# Patient Record
Sex: FEMALE | Race: BLACK OR AFRICAN AMERICAN | NOT HISPANIC OR LATINO | URBAN - METROPOLITAN AREA
[De-identification: names, ages, dates, MRNs, and addresses within clinical notes are randomized per-mention and may not be internally consistent; named-entity substitution may affect disease eponyms.]

---

## 2019-01-31 ENCOUNTER — INPATIENT (INPATIENT)
Facility: HOSPITAL | Age: 69
LOS: 15 days | Discharge: NURSING, REHAB OR LONG-TERM CARE | End: 2019-02-16
Attending: INTERNAL MEDICINE | Admitting: INTERNAL MEDICINE
Payer: MEDICARE

## 2019-01-31 VITALS — DIASTOLIC BLOOD PRESSURE: 94 MMHG | SYSTOLIC BLOOD PRESSURE: 167 MMHG

## 2019-01-31 DIAGNOSIS — N63.10 UNSPECIFIED LUMP IN THE RIGHT BREAST, UNSPECIFIED QUADRANT: ICD-10-CM

## 2019-01-31 LAB
ALBUMIN SERPL ELPH-MCNC: 2.3 G/DL — LOW (ref 3.3–5)
ALP SERPL-CCNC: 220 U/L — HIGH (ref 40–120)
ALT FLD-CCNC: < 4 U/L — LOW (ref 4–33)
ANION GAP SERPL CALC-SCNC: 14 MMO/L — SIGNIFICANT CHANGE UP (ref 7–14)
APTT BLD: 25 SEC — LOW (ref 27.5–36.3)
AST SERPL-CCNC: 40 U/L — HIGH (ref 4–32)
BASE EXCESS BLDV CALC-SCNC: 1.6 MMOL/L — SIGNIFICANT CHANGE UP
BASOPHILS # BLD AUTO: 0.05 K/UL — SIGNIFICANT CHANGE UP (ref 0–0.2)
BASOPHILS NFR BLD AUTO: 0.3 % — SIGNIFICANT CHANGE UP (ref 0–2)
BILIRUB SERPL-MCNC: 0.4 MG/DL — SIGNIFICANT CHANGE UP (ref 0.2–1.2)
BLD GP AB SCN SERPL QL: NEGATIVE — SIGNIFICANT CHANGE UP
BLOOD GAS VENOUS - CREATININE: 0.41 MG/DL — LOW (ref 0.5–1.3)
BUN SERPL-MCNC: 11 MG/DL — SIGNIFICANT CHANGE UP (ref 7–23)
CALCIUM SERPL-MCNC: 9 MG/DL — SIGNIFICANT CHANGE UP (ref 8.4–10.5)
CHLORIDE BLDV-SCNC: 107 MMOL/L — SIGNIFICANT CHANGE UP (ref 96–108)
CHLORIDE SERPL-SCNC: 103 MMOL/L — SIGNIFICANT CHANGE UP (ref 98–107)
CO2 SERPL-SCNC: 24 MMOL/L — SIGNIFICANT CHANGE UP (ref 22–31)
CREAT SERPL-MCNC: 0.48 MG/DL — LOW (ref 0.5–1.3)
EOSINOPHIL # BLD AUTO: 0.02 K/UL — SIGNIFICANT CHANGE UP (ref 0–0.5)
EOSINOPHIL NFR BLD AUTO: 0.1 % — SIGNIFICANT CHANGE UP (ref 0–6)
GAS PNL BLDV: 139 MMOL/L — SIGNIFICANT CHANGE UP (ref 136–146)
GLUCOSE BLDV-MCNC: 72 — SIGNIFICANT CHANGE UP (ref 70–99)
GLUCOSE SERPL-MCNC: 75 MG/DL — SIGNIFICANT CHANGE UP (ref 70–99)
HCO3 BLDV-SCNC: 25 MMOL/L — SIGNIFICANT CHANGE UP (ref 20–27)
HCT VFR BLD CALC: 24.2 % — LOW (ref 34.5–45)
HCT VFR BLDV CALC: 21.6 % — LOW (ref 34.5–45)
HGB BLD-MCNC: 6.8 G/DL — CRITICAL LOW (ref 11.5–15.5)
HGB BLDV-MCNC: 6.9 G/DL — CRITICAL LOW (ref 11.5–15.5)
IMM GRANULOCYTES NFR BLD AUTO: 2.4 % — HIGH (ref 0–1.5)
INR BLD: 1.26 — HIGH (ref 0.88–1.17)
LACTATE BLDV-MCNC: 4 MMOL/L — CRITICAL HIGH (ref 0.5–2)
LYMPHOCYTES # BLD AUTO: 1.6 K/UL — SIGNIFICANT CHANGE UP (ref 1–3.3)
LYMPHOCYTES # BLD AUTO: 10 % — LOW (ref 13–44)
MAGNESIUM SERPL-MCNC: 2.1 MG/DL — SIGNIFICANT CHANGE UP (ref 1.6–2.6)
MCHC RBC-ENTMCNC: 27.5 PG — SIGNIFICANT CHANGE UP (ref 27–34)
MCHC RBC-ENTMCNC: 28.1 % — LOW (ref 32–36)
MCV RBC AUTO: 98 FL — SIGNIFICANT CHANGE UP (ref 80–100)
MONOCYTES # BLD AUTO: 0.6 K/UL — SIGNIFICANT CHANGE UP (ref 0–0.9)
MONOCYTES NFR BLD AUTO: 3.7 % — SIGNIFICANT CHANGE UP (ref 2–14)
NEUTROPHILS # BLD AUTO: 13.38 K/UL — HIGH (ref 1.8–7.4)
NEUTROPHILS NFR BLD AUTO: 83.5 % — HIGH (ref 43–77)
NRBC # FLD: 0.03 K/UL — LOW (ref 25–125)
OB PNL STL: NEGATIVE — SIGNIFICANT CHANGE UP
PCO2 BLDV: 43 MMHG — SIGNIFICANT CHANGE UP (ref 41–51)
PH BLDV: 7.4 PH — SIGNIFICANT CHANGE UP (ref 7.32–7.43)
PHOSPHATE SERPL-MCNC: 2.9 MG/DL — SIGNIFICANT CHANGE UP (ref 2.5–4.5)
PLATELET # BLD AUTO: 662 K/UL — HIGH (ref 150–400)
PMV BLD: 9.2 FL — SIGNIFICANT CHANGE UP (ref 7–13)
PO2 BLDV: 35 MMHG — SIGNIFICANT CHANGE UP (ref 35–40)
POTASSIUM BLDV-SCNC: 6.2 MMOL/L — CRITICAL HIGH (ref 3.4–4.5)
POTASSIUM SERPL-MCNC: 4.3 MMOL/L — SIGNIFICANT CHANGE UP (ref 3.5–5.3)
POTASSIUM SERPL-SCNC: 4.3 MMOL/L — SIGNIFICANT CHANGE UP (ref 3.5–5.3)
PROT SERPL-MCNC: 6.7 G/DL — SIGNIFICANT CHANGE UP (ref 6–8.3)
PROTHROM AB SERPL-ACNC: 14.5 SEC — HIGH (ref 9.8–13.1)
RBC # BLD: 2.47 M/UL — LOW (ref 3.8–5.2)
RBC # FLD: 18.6 % — HIGH (ref 10.3–14.5)
RH IG SCN BLD-IMP: POSITIVE — SIGNIFICANT CHANGE UP
RH IG SCN BLD-IMP: POSITIVE — SIGNIFICANT CHANGE UP
SAO2 % BLDV: 55.4 % — LOW (ref 60–85)
SODIUM SERPL-SCNC: 141 MMOL/L — SIGNIFICANT CHANGE UP (ref 135–145)
WBC # BLD: 16.04 K/UL — HIGH (ref 3.8–10.5)
WBC # FLD AUTO: 16.04 K/UL — HIGH (ref 3.8–10.5)

## 2019-01-31 PROCEDURE — 99223 1ST HOSP IP/OBS HIGH 75: CPT

## 2019-01-31 PROCEDURE — 15100 SPLT AGRFT T/A/L 1ST 100SQCM: CPT

## 2019-01-31 PROCEDURE — 71045 X-RAY EXAM CHEST 1 VIEW: CPT | Mod: 26

## 2019-01-31 PROCEDURE — 15101 SPLT AGRFT T/A/L EA ADDL 100: CPT

## 2019-01-31 RX ORDER — VANCOMYCIN HCL 1 G
1000 VIAL (EA) INTRAVENOUS EVERY 12 HOURS
Qty: 0 | Refills: 0 | Status: DISCONTINUED | OUTPATIENT
Start: 2019-01-31 | End: 2019-02-01

## 2019-01-31 RX ORDER — INFLUENZA VIRUS VACCINE 15; 15; 15; 15 UG/.5ML; UG/.5ML; UG/.5ML; UG/.5ML
0.5 SUSPENSION INTRAMUSCULAR ONCE
Qty: 0 | Refills: 0 | Status: DISCONTINUED | OUTPATIENT
Start: 2019-01-31 | End: 2019-02-11

## 2019-01-31 RX ORDER — SODIUM CHLORIDE 9 MG/ML
1000 INJECTION INTRAMUSCULAR; INTRAVENOUS; SUBCUTANEOUS ONCE
Qty: 0 | Refills: 0 | Status: COMPLETED | OUTPATIENT
Start: 2019-01-31 | End: 2019-01-31

## 2019-01-31 RX ORDER — LACTOBACILLUS ACIDOPHILUS 100MM CELL
1 CAPSULE ORAL EVERY 12 HOURS
Qty: 0 | Refills: 0 | Status: DISCONTINUED | OUTPATIENT
Start: 2019-01-31 | End: 2019-02-16

## 2019-01-31 RX ORDER — MORPHINE SULFATE 50 MG/1
2 CAPSULE, EXTENDED RELEASE ORAL ONCE
Qty: 0 | Refills: 0 | Status: DISCONTINUED | OUTPATIENT
Start: 2019-01-31 | End: 2019-01-31

## 2019-01-31 RX ORDER — PIPERACILLIN AND TAZOBACTAM 4; .5 G/20ML; G/20ML
3.38 INJECTION, POWDER, LYOPHILIZED, FOR SOLUTION INTRAVENOUS EVERY 8 HOURS
Qty: 0 | Refills: 0 | Status: DISCONTINUED | OUTPATIENT
Start: 2019-01-31 | End: 2019-02-01

## 2019-01-31 RX ADMIN — SODIUM CHLORIDE 1000 MILLILITER(S): 9 INJECTION INTRAMUSCULAR; INTRAVENOUS; SUBCUTANEOUS at 19:35

## 2019-01-31 NOTE — CONSULT NOTE ADULT - SUBJECTIVE AND OBJECTIVE BOX
CC: 68y old Female admitted with a chief complaint of Symptomatic anemia, Rt Fungating Breast mass    HPI: 67 yo female with no medical follow up presenting following a fall and weakness    PMHx: No pertinent past medical history  PSHx: No significant past surgical history    Medications (inpatient): lactobacillus acidophilus 1 Tablet(s) Oral every 12 hours  piperacillin/tazobactam IVPB. 3.375 Gram(s) IV Intermittent every 8 hours  vancomycin  IVPB 1000 milliGRAM(s) IV Intermittent every 12 hours    Medications (PRN):  Allergies: No Known Allergies  (Intolerances: )  Social Hx:   Family Hx:     Physical Exam  T(C): 37.1  HR: 100 (100 - 100)  BP: 180/100 (167/94 - 180/100)  RR: 20 (20 - 20)  SpO2: 96% (96% - 96%)  Tmax: T(C): , Max: 37.1 (01-31-19 @ 20:42)    General: well developed, well nourished, NAD  Neuro: alert and oriented, no focal deficits, moves all extremities spontaneously  HEENT: NCAT, EOMI, anicteric, mucosa moist  Respiratory: airway patent, respirations unlabored  CVS: regular rate and rhythm  Abdomen: soft, nontender, nondistended  Extremities: no edema, sensation and movement grossly intact  Skin: warm, dry, appropriate color    Labs:                        6.8    16.04 )-----------( 662      ( 31 Jan 2019 19:56 )             24.2     PT/INR - ( 31 Jan 2019 19:56 )   PT: 14.5 SEC;   INR: 1.26          PTT - ( 31 Jan 2019 19:56 )  PTT:25.0 SEC  01-31    141  |  103  |  11  ----------------------------<  75  4.3   |  24  |  0.48<L>    Ca    9.0      31 Jan 2019 19:56  Phos  2.9     01-31  Mg     2.1     01-31    TPro  6.7  /  Alb  2.3<L>  /  TBili  0.4  /  DBili  x   /  AST  40<H>  /  ALT  < 4<L>  /  AlkPhos  220<H>  01-31            Imaging and other studies: CC: 68y old Female admitted with a chief complaint of Symptomatic anemia, Rt Fungating Breast mass    HPI: 67 yo female with no medical follow up presenting following a fall several weeks ago associated with weakness and difficulty ambulating., found to have a large breast mass. Patient reports breast mass has been present for a few months, unsure of when it started. Initially below the skin but then started to erupt and smell, but unsure of how long it's been through the skin. She had some difficulty with her ADLs, and her [employer]ab[employer]ism has been helping her at home. Her daughter is present at bedside. Reports weight loss of > 20 lbs, which she attributes to a poor appetite. A ten point review of systems was otherwise negative. Of note, patient does not have any routine follow up with physicians, was hypertensive upon admission but takes no medications. She has never had a mammogram. Her paternal half sister  of breast cancer.    PMHx: No pertinent past medical history  PSHx: No significant past surgical history    Medications (inpatient): lactobacillus acidophilus 1 Tablet(s) Oral every 12 hours  piperacillin/tazobactam IVPB. 3.375 Gram(s) IV Intermittent every 8 hours  vancomycin  IVPB 1000 milliGRAM(s) IV Intermittent every 12 hours    Allergies: No Known Allergies  (Intolerances: None)  Social Hx: Denies tobacco, EtOH use; lives alone, works as a   Family Hx: Father - prostate cancer; half sister (paternal) - breast cancer     Physical Exam  T(C): 37.1  HR: 100 (100 - 100)  BP: 180/100 (167/94 - 180/100)  RR: 20 (20 - 20)  SpO2: 96% (96% - 96%)  Tmax: T(C): , Max: 37.1 (19 @ 20:42)    General: thin, NAD  Neuro: alert and oriented, no focal deficits, moves all extremities spontaneously  HEENT: NCAT, anicteric, mucosa moist  Respiratory: airway patent, respirations unlabored  Chest: large (~15cm) R breast fungating mass extending to axilla, superior to nipple, malodorous without obvious bleeding or discharge  CVS: regular   Abdomen: soft, nontender, nondistended  Extremities: no edema, sensation and movement grossly intact  Skin: warm, dry, appropriate color    Labs:                        6.8    16.04 )-----------( 662      ( 2019 19:56 )             24.2     PT/INR - ( 2019 19:56 )   PT: 14.5 SEC;   INR: 1.26          PTT - ( 2019 19:56 )  PTT:25.0 SEC      141  |  103  |  11  ----------------------------<  75  4.3   |  24  |  0.48<L>    Ca    9.0      2019 19:56  Phos  2.9       Mg     2.1         TPro  6.7  /  Alb  2.3<L>  /  TBili  0.4  /  DBili  x   /  AST  40<H>  /  ALT  < 4<L>  /  AlkPhos  220<H>      Imaging and other studies:< from: Xray Chest 1 View- PORTABLE-Urgent (19 @ 20:13) >  INTERPRETATION:  Multiple lobular masses in bilateral lungs, concerning for metastatic disease.  Large opacified mass overlying right chest wall, axilla, and the right arm.    < end of copied text >

## 2019-01-31 NOTE — ED ADULT NURSE REASSESSMENT NOTE - NS ED NURSE REASSESS COMMENT FT1
Pt is AOX4. Pt staright cath for urine, no urine obtained. Pt was recently incontinent of urine and cleaned. Pt provided with water, will continue to monitor.

## 2019-01-31 NOTE — ED ADULT TRIAGE NOTE - CHIEF COMPLAINT QUOTE
Pts daughter states went to visit her mother today and was told by her mother that she had fallen about a few weeks ago after feeling dizzy. Pts daughter states mother was in bed in urine . pts daughter also states when she went to bathe her mother noted that her right breast was draining blood and had malodorous smell to it. Pts breast looks like a cauliflower . and has odor. Pt reports it has been like that x several months and become worse. pt is very weak unable to walk.

## 2019-01-31 NOTE — H&P ADULT - MUSCULOSKELETAL
details… detailed exam no joint warmth/no joint erythema/no joint swelling/ROM intact/no calf tenderness

## 2019-01-31 NOTE — ED PROVIDER NOTE - ATTENDING CONTRIBUTION TO CARE
DR. BLOCH, ATTENDING MD-  I performed a face to face bedside interview with patient regarding history of present illness, review of symptoms and past medical history. I completed an independent physical exam.  I have discussed patient's plan of care with the resident.  Patient examined, pale conj, lungs clear, huge fungating right breast mass, malodorous, ext right leg 4/5 lrft 5/5. incont of urine. cranial 2-12 intact, skin nml except over breast. neck supple

## 2019-01-31 NOTE — ED PROVIDER NOTE - PHYSICAL EXAMINATION
GEN: Well appearing, this woman, in no apparent distress.  HEAD: NCAT  HEENT: PERRL, Airway patent, EOMI, non-erythematous pharynx, no exudates, uvula midline, MMM, neck supple, no LAD, no JVD  Breast (Chaperone MD Lisa Yañez): R malodorous, large fungating breast mass draining yellow purulence. L breast appears normal without mass or lumps or discoloration  LUNG: CTAB, no adventitious sounds, no retractions, no nasal flaring  CV: RRR, no murmurs,   Abd: soft, TTP RUQ and epigastric, no rebound or guarding, BS+ in all quadrants, no CVAT  MSK: WWP, Pulses 2+ in extremities, No edema   Neuro:  AAOx3, Ambulatory with stable gait.  Skin: Warm and dry, no evidence of rash  Psych: normal mood and affect

## 2019-01-31 NOTE — H&P ADULT - ASSESSMENT
67 y/o Female no known pmhx presents with malodorous huge R fungating breast mass with SOB and weight loss (25 lbs in 2 mths) x several weeks. Pt does not go to the doctor and denies taking any meds but endorses that "God will take care of her". Pt complains of falling on the stairs a week ago and  has  difficulty ambulating to bathroom due to weakness so has urinated on herself at times,  +dysuria and back pain that is non-radiating, Denies CP, N/V, abdominal pain, fever, constipation, dysphagia, BRBPR, hemoptysis. Pt c/o HA, Dizziness, weakness, dry Cough, + SOB, No Fever, + Diarrhea intermittent, decreased PO intake, + Urinary urgency, Pt's Sister  from breast cancer. Pt is accompanied with her daughter tonight, A+O x 3, EX Smoker, Pt refuses Blood transfusion for now, Hgb 6.8, Occult stool Neg., I requested Blood Cultures x 2 , Iron studies, Ferritin, Vit B12, Folate, LDH, Retic count, Haptoglobin, UA tonight, I called for surgery consult as well tonight, + elevated BLOOD Pressure tonight, Ct Chest/Abdomen/Pelvis/Head are ordered tonight, will start pt on IV zosyn and IV Vanco tonight due to Fungating , Necrotic Rt Breast Mass, + Leukocytosis, WBC 16.04, Lactate 4.0,

## 2019-01-31 NOTE — ED ADULT NURSE NOTE - OBJECTIVE STATEMENT
68F no known pmhx presents with malodorous huge R fungating breast mass with sob and weight loss (25 lbs in 2 mths) x several weeks. Pt does not go to the doctor and denies taking any meds but endorses that "God will take care of her". Pt complains of falling on the stairs a week ago and has has difficulty ambulating to bathroom due to weakness so has urinated on herself at times. +dysuria and back pain that is non-radiating. Denies cp, n/v, abdominal pain, f/c, diarrhea, constipation, ha, dysphagia, brbpr, hemoptysis. 68F AOx3, no known pmhx presents with malodorous draining massive R fungating breast mass with sob and weight loss (25 lbs in 2 mths) x several weeks. As per daughter, patient does not seek any medical care. Pt s/p fall 1/2 on the stairs and has been unable to ambulate since with c/o back pain. Pt staets she has not told her family because she did not want them to get scared. Pt reports weakness, inability to reach bathroom, intermittent pain to right breast, episodes of nausea, denies fever, chills, diarrhea, SOB,

## 2019-01-31 NOTE — ED PROVIDER NOTE - OBJECTIVE STATEMENT
68F no known pmhx presents with malodorous huge R fungating breast mass with sob and weight loss (25 lbs in 2 mths) x several weeks. Pt does not go to the doctor and denies taking any meds but endorses that "God will take care of her". Pt complains of falling on the stairs a week ago and has has difficulty ambulating to bathroom due to weakness so has urinated on herself at times. +dysuria and back pain that is non-radiating. Denies cp, n/v, abdominal pain, f/c, diarrhea, constipation, ha, dysphagia, brbpr, hemoptysis.     Sister  from breast cancer.

## 2019-01-31 NOTE — ED PROVIDER NOTE - NS ED ROS FT
Constitutional: no fevers, no chills.  Eyes: no visual changes.  Ears: no ear drainage, no ear pain.  Nose: no nasal congestion.  Mouth/Throat: no sore throat.  Cardiovascular: no chest pain.  Respiratory: +shortness of breath, no wheezing, no cough  Gastrointestinal: no nausea, no vomiting, no diarrhea, no abdominal pain.  MSK: no flank pain, no back pain.  Genitourinary: no dysuria, no hematuria.  Skin: no rashes.  +R fungating breast mass  Neuro: no headache,   Psychiatric: no known mental health issues.

## 2019-01-31 NOTE — H&P ADULT - PROBLEM SELECTOR PLAN 1
+ Rt Breast Fungating, Necrotic Mass, + Malodorous, + Drainage, Surgery consult tonight, ONC consult, R/O Breast CA, F/U CBC, CMP, Blood cultures, UA, Urine Culture,   ID consult in AM: start IV Vanco, IV Zosyn, Bacid, wound consult,  Fall/aspiration precaution,   PT consult,  CT Chest/A/P/Head ,

## 2019-01-31 NOTE — H&P ADULT - HISTORY OF PRESENT ILLNESS
69 y/o Female no known pmhx presents with malodorous huge R fungating breast mass with sob and weight loss (25 lbs in 2 mths) x several weeks. Pt does not go to the doctor and denies taking any meds but endorses that "God will take care of her". Pt complains of falling on the stairs a week ago and  has  difficulty ambulating to bathroom due to weakness so has urinated on herself at times,  +dysuria and back pain that is non-radiating, Denies CP, N/V, abdominal pain, fever, constipation, dysphagia, BRBPR, hemoptysis. Pt c/o HA, Dizziness, weakness, dry Cough, + SOB, No Fever, + Diarrhea intermittent, decreased PO intake, + Urinary urgency, Pt's Sister  from breast cancer. Pt is accompanied with her daughter tonight, A+O x 3, EX Smoker, Agreed with 2 units of PRBC, I requested Blood Cultures x 2 , Iron studies, Ferritin, Vit B12, Folate, LDH, Retic count, Haptoglobin, UA tonight 67 y/o Female no known pmhx presents with malodorous huge R fungating breast mass with SOB and weight loss (25 lbs in 2 mths) x several weeks. Pt does not go to the doctor and denies taking any meds but endorses that "God will take care of her". Pt complains of falling on the stairs a week ago and  has  difficulty ambulating to bathroom due to weakness so has urinated on herself at times,  +dysuria and back pain that is non-radiating, Denies CP, N/V, abdominal pain, fever, constipation, dysphagia, BRBPR, hemoptysis. Pt c/o HA, Dizziness, weakness, dry Cough, + SOB, No Fever, + Diarrhea intermittent, decreased PO intake, + Urinary urgency, Pt's Sister  from breast cancer. Pt is accompanied with her daughter tonight, A+O x 3, EX Smoker, Pt refuses Blood transfusion for now, Hgb 6.8, Occult stool Neg., I requested Blood Cultures x 2 , Iron studies, Ferritin, Vit B12, Folate, LDH, Retic count, Haptoglobin, UA tonight, I called for surgery consult as well tonight, + elevated BLOOD Pressure tonight, Ct Chest/Abdomen/Pelvis/Head are ordered tonight, will start pt on IV zosyn and IV Vanco tonight due to Fungating , Necrotic Rt Breast Mass, + Leukocytosis, WBC 16.04, Lactate 4.0,     Labs: Lactate 4.0, Na 141, K+ 4.3, BUN 11, Creatinine 0.48, Glucose 75, alk Phos 220, AST 40, WBC 16.04, Hgb 6.8, Platelet 662, PT 14.5, INR 1.26, PTT 25.0, Occult stool Neg.,     Vitals: Tem 98.7, , /100, RR 20, 96% RA,

## 2019-01-31 NOTE — H&P ADULT - FAMILY HISTORY
Family history of breast cancer in sister     Father  Still living? No  Family history of pancreatic cancer, Age at diagnosis: Age Unknown

## 2019-01-31 NOTE — CONSULT NOTE ADULT - ASSESSMENT
68 year old female presenting with R fungating breast mass, likely metastatic breast cancer; afebrile and hemodynamically stable.    - patient anemic, presently refusing transfusion  - in setting of large fungating mass, patient may require palliative mastectomy  - discussed with patient the possibility this mass is likely cancer and that she may need surgery to remove it; patient stated she is unable to consider surgery at this time, she does not want to discuss it further, but would prefer time to think; her daughter understands that the proposed procedure would remove the mass, but would not provide cure for her cancer  - appreciate Heme/Onc consultation in setting of metastatic disease  - patient presently requesting more time to consider options before consenting to biopsy or surgery    Will follow with Surgical Oncology team (D team, [id number]) and discuss with Dr. Barreto  --VALERIE Irving, PGY-4

## 2019-01-31 NOTE — ED ADULT NURSE NOTE - NSIMPLEMENTINTERV_GEN_ALL_ED
Implemented All Fall Risk Interventions:  [place] to call system. Call bell, personal items and telephone within reach. Instruct patient to call for assistance. Room bathroom lighting operational. Non-slip footwear when patient is off stretcher. Physically safe environment: no spills, clutter or unnecessary equipment. Stretcher in lowest position, wheels locked, appropriate side rails in place. Provide visual cue, wrist band, yellow gown, etc. Monitor gait and stability. Monitor for mental status changes and reorient to person, place, and time. Review medications for side effects contributing to fall risk. Reinforce activity limits and safety measures with patient and family.

## 2019-01-31 NOTE — ED PROVIDER NOTE - MEDICAL DECISION MAKING DETAILS
68F no known pmhx presents with malodorous huge R fungating breast mass with sob x several weeks likely cancerous. Pan scan for mets, and admit for onc to see and possibly biopsy. admission labs, including cxr, ekg, coags, vbg, ua, ucx, ct chest and abdomen pelvis. SAtting well on RA. will give morphine for pain. ivf.

## 2019-01-31 NOTE — H&P ADULT - PROBLEM SELECTOR PLAN 2
Hgb 6.8, pt refuses PRBC for now, ECG, + SOB, HA, Dizziness,  Weakness,   Iron Studies, ferritin, Vit B12, Folate, LDH, Haptoglobin, Retic count, HIV test, Hep A,B,C profile,

## 2019-02-01 DIAGNOSIS — N63.10 UNSPECIFIED LUMP IN THE RIGHT BREAST, UNSPECIFIED QUADRANT: ICD-10-CM

## 2019-02-01 DIAGNOSIS — R74.8 ABNORMAL LEVELS OF OTHER SERUM ENZYMES: ICD-10-CM

## 2019-02-01 DIAGNOSIS — R06.02 SHORTNESS OF BREATH: ICD-10-CM

## 2019-02-01 DIAGNOSIS — D64.9 ANEMIA, UNSPECIFIED: ICD-10-CM

## 2019-02-01 DIAGNOSIS — Z53.1 PROCEDURE AND TREATMENT NOT CARRIED OUT BECAUSE OF PATIENT'S DECISION FOR REASONS OF BELIEF AND GROUP PRESSURE: ICD-10-CM

## 2019-02-01 DIAGNOSIS — Z29.9 ENCOUNTER FOR PROPHYLACTIC MEASURES, UNSPECIFIED: ICD-10-CM

## 2019-02-01 DIAGNOSIS — R03.0 ELEVATED BLOOD-PRESSURE READING, WITHOUT DIAGNOSIS OF HYPERTENSION: ICD-10-CM

## 2019-02-01 DIAGNOSIS — A41.9 SEPSIS, UNSPECIFIED ORGANISM: ICD-10-CM

## 2019-02-01 DIAGNOSIS — R30.0 DYSURIA: ICD-10-CM

## 2019-02-01 LAB
ALBUMIN SERPL ELPH-MCNC: 2.1 G/DL — LOW (ref 3.3–5)
ALBUMIN SERPL ELPH-MCNC: 2.3 G/DL — LOW (ref 3.3–5)
ALP SERPL-CCNC: 203 U/L — HIGH (ref 40–120)
ALP SERPL-CCNC: 227 U/L — HIGH (ref 40–120)
ALT FLD-CCNC: 5 U/L — SIGNIFICANT CHANGE UP (ref 4–33)
ALT FLD-CCNC: 5 U/L — SIGNIFICANT CHANGE UP (ref 4–33)
AMORPH CRY # UR COMP ASSIST: SIGNIFICANT CHANGE UP (ref 0–0)
ANION GAP SERPL CALC-SCNC: 16 MMO/L — HIGH (ref 7–14)
ANION GAP SERPL CALC-SCNC: 16 MMO/L — HIGH (ref 7–14)
APPEARANCE UR: SIGNIFICANT CHANGE UP
AST SERPL-CCNC: 40 U/L — HIGH (ref 4–32)
AST SERPL-CCNC: 54 U/L — HIGH (ref 4–32)
BACTERIA # UR AUTO: HIGH
BASOPHILS # BLD AUTO: 0.03 K/UL — SIGNIFICANT CHANGE UP (ref 0–0.2)
BASOPHILS # BLD AUTO: 0.05 K/UL — SIGNIFICANT CHANGE UP (ref 0–0.2)
BASOPHILS NFR BLD AUTO: 0.2 % — SIGNIFICANT CHANGE UP (ref 0–2)
BASOPHILS NFR BLD AUTO: 0.3 % — SIGNIFICANT CHANGE UP (ref 0–2)
BILIRUB SERPL-MCNC: 0.3 MG/DL — SIGNIFICANT CHANGE UP (ref 0.2–1.2)
BILIRUB SERPL-MCNC: 0.4 MG/DL — SIGNIFICANT CHANGE UP (ref 0.2–1.2)
BILIRUB UR-MCNC: SIGNIFICANT CHANGE UP
BLOOD UR QL VISUAL: SIGNIFICANT CHANGE UP
BUN SERPL-MCNC: 10 MG/DL — SIGNIFICANT CHANGE UP (ref 7–23)
BUN SERPL-MCNC: 11 MG/DL — SIGNIFICANT CHANGE UP (ref 7–23)
CALCIUM SERPL-MCNC: 8.4 MG/DL — SIGNIFICANT CHANGE UP (ref 8.4–10.5)
CALCIUM SERPL-MCNC: 8.6 MG/DL — SIGNIFICANT CHANGE UP (ref 8.4–10.5)
CHLORIDE SERPL-SCNC: 102 MMOL/L — SIGNIFICANT CHANGE UP (ref 98–107)
CHLORIDE SERPL-SCNC: 104 MMOL/L — SIGNIFICANT CHANGE UP (ref 98–107)
CHOLEST SERPL-MCNC: 118 MG/DL — LOW (ref 120–199)
CO2 SERPL-SCNC: 21 MMOL/L — LOW (ref 22–31)
CO2 SERPL-SCNC: 22 MMOL/L — SIGNIFICANT CHANGE UP (ref 22–31)
COLOR SPEC: YELLOW — SIGNIFICANT CHANGE UP
CREAT SERPL-MCNC: 0.46 MG/DL — LOW (ref 0.5–1.3)
CREAT SERPL-MCNC: 0.47 MG/DL — LOW (ref 0.5–1.3)
EOSINOPHIL # BLD AUTO: 0.03 K/UL — SIGNIFICANT CHANGE UP (ref 0–0.5)
EOSINOPHIL # BLD AUTO: 0.05 K/UL — SIGNIFICANT CHANGE UP (ref 0–0.5)
EOSINOPHIL NFR BLD AUTO: 0.2 % — SIGNIFICANT CHANGE UP (ref 0–6)
EOSINOPHIL NFR BLD AUTO: 0.3 % — SIGNIFICANT CHANGE UP (ref 0–6)
EPI CELLS # UR: SIGNIFICANT CHANGE UP
FERRITIN SERPL-MCNC: 859.3 NG/ML — HIGH (ref 15–150)
FOLATE SERPL-MCNC: 5.5 NG/ML — SIGNIFICANT CHANGE UP (ref 4.7–20)
FOLATE SERPL-MCNC: 6.1 NG/ML — SIGNIFICANT CHANGE UP (ref 4.7–20)
GLUCOSE SERPL-MCNC: 82 MG/DL — SIGNIFICANT CHANGE UP (ref 70–99)
GLUCOSE SERPL-MCNC: 85 MG/DL — SIGNIFICANT CHANGE UP (ref 70–99)
GLUCOSE UR-MCNC: NEGATIVE — SIGNIFICANT CHANGE UP
HAPTOGLOB SERPL-MCNC: 511 MG/DL — HIGH (ref 34–200)
HAPTOGLOB SERPL-MCNC: 545 MG/DL — HIGH (ref 34–200)
HAV IGM SER-ACNC: NONREACTIVE — SIGNIFICANT CHANGE UP
HAV IGM SER-ACNC: NONREACTIVE — SIGNIFICANT CHANGE UP
HBA1C BLD-MCNC: 4.2 % — SIGNIFICANT CHANGE UP (ref 4–5.6)
HBV CORE IGM SER-ACNC: NONREACTIVE — SIGNIFICANT CHANGE UP
HBV CORE IGM SER-ACNC: NONREACTIVE — SIGNIFICANT CHANGE UP
HBV SURFACE AG SER-ACNC: NONREACTIVE — SIGNIFICANT CHANGE UP
HBV SURFACE AG SER-ACNC: NONREACTIVE — SIGNIFICANT CHANGE UP
HCT VFR BLD CALC: 21.1 % — LOW (ref 34.5–45)
HCT VFR BLD CALC: 26.1 % — LOW (ref 34.5–45)
HCV AB S/CO SERPL IA: 2.27 S/CO — SIGNIFICANT CHANGE UP
HCV AB S/CO SERPL IA: 2.7 S/CO — SIGNIFICANT CHANGE UP
HCV AB SERPL-IMP: SIGNIFICANT CHANGE UP
HCV AB SERPL-IMP: SIGNIFICANT CHANGE UP
HDLC SERPL-MCNC: 17 MG/DL — LOW (ref 45–65)
HGB BLD-MCNC: 6.1 G/DL — CRITICAL LOW (ref 11.5–15.5)
HGB BLD-MCNC: 7.6 G/DL — LOW (ref 11.5–15.5)
IMM GRANULOCYTES NFR BLD AUTO: 2 % — HIGH (ref 0–1.5)
IMM GRANULOCYTES NFR BLD AUTO: 4.2 % — HIGH (ref 0–1.5)
IRON SATN MFR SERPL: 105 UG/DL — LOW (ref 140–530)
IRON SATN MFR SERPL: 19 UG/DL — LOW (ref 30–160)
KETONES UR-MCNC: NEGATIVE — SIGNIFICANT CHANGE UP
LACTATE SERPL-SCNC: 3.1 MMOL/L — HIGH (ref 0.5–2)
LDH SERPL L TO P-CCNC: > 1800 U/L — HIGH (ref 135–225)
LDH SERPL L TO P-CCNC: > 1800 U/L — HIGH (ref 135–225)
LEUKOCYTE ESTERASE UR-ACNC: HIGH
LIPID PNL WITH DIRECT LDL SERPL: 49 MG/DL — SIGNIFICANT CHANGE UP
LYMPHOCYTES # BLD AUTO: 1.52 K/UL — SIGNIFICANT CHANGE UP (ref 1–3.3)
LYMPHOCYTES # BLD AUTO: 10.1 % — LOW (ref 13–44)
LYMPHOCYTES # BLD AUTO: 14.7 % — SIGNIFICANT CHANGE UP (ref 13–44)
LYMPHOCYTES # BLD AUTO: 2.36 K/UL — SIGNIFICANT CHANGE UP (ref 1–3.3)
MAGNESIUM SERPL-MCNC: 1.9 MG/DL — SIGNIFICANT CHANGE UP (ref 1.6–2.6)
MAGNESIUM SERPL-MCNC: 2 MG/DL — SIGNIFICANT CHANGE UP (ref 1.6–2.6)
MCHC RBC-ENTMCNC: 28 PG — SIGNIFICANT CHANGE UP (ref 27–34)
MCHC RBC-ENTMCNC: 28.4 PG — SIGNIFICANT CHANGE UP (ref 27–34)
MCHC RBC-ENTMCNC: 28.9 % — LOW (ref 32–36)
MCHC RBC-ENTMCNC: 29.1 % — LOW (ref 32–36)
MCV RBC AUTO: 96.8 FL — SIGNIFICANT CHANGE UP (ref 80–100)
MCV RBC AUTO: 97.4 FL — SIGNIFICANT CHANGE UP (ref 80–100)
MONOCYTES # BLD AUTO: 0.44 K/UL — SIGNIFICANT CHANGE UP (ref 0–0.9)
MONOCYTES # BLD AUTO: 0.62 K/UL — SIGNIFICANT CHANGE UP (ref 0–0.9)
MONOCYTES NFR BLD AUTO: 2.9 % — SIGNIFICANT CHANGE UP (ref 2–14)
MONOCYTES NFR BLD AUTO: 3.9 % — SIGNIFICANT CHANGE UP (ref 2–14)
NEUTROPHILS # BLD AUTO: 12.3 K/UL — HIGH (ref 1.8–7.4)
NEUTROPHILS # BLD AUTO: 12.79 K/UL — HIGH (ref 1.8–7.4)
NEUTROPHILS NFR BLD AUTO: 76.6 % — SIGNIFICANT CHANGE UP (ref 43–77)
NEUTROPHILS NFR BLD AUTO: 84.6 % — HIGH (ref 43–77)
NITRITE UR-MCNC: NEGATIVE — SIGNIFICANT CHANGE UP
NRBC # FLD: 0.03 K/UL — LOW (ref 25–125)
NRBC # FLD: 0.05 K/UL — LOW (ref 25–125)
PH UR: 5.5 — SIGNIFICANT CHANGE UP (ref 5–8)
PHOSPHATE SERPL-MCNC: 2.5 MG/DL — SIGNIFICANT CHANGE UP (ref 2.5–4.5)
PHOSPHATE SERPL-MCNC: 2.5 MG/DL — SIGNIFICANT CHANGE UP (ref 2.5–4.5)
PLATELET # BLD AUTO: 578 K/UL — HIGH (ref 150–400)
PLATELET # BLD AUTO: 621 K/UL — HIGH (ref 150–400)
PMV BLD: 9 FL — SIGNIFICANT CHANGE UP (ref 7–13)
PMV BLD: 9.2 FL — SIGNIFICANT CHANGE UP (ref 7–13)
POTASSIUM SERPL-MCNC: 3.7 MMOL/L — SIGNIFICANT CHANGE UP (ref 3.5–5.3)
POTASSIUM SERPL-MCNC: 3.9 MMOL/L — SIGNIFICANT CHANGE UP (ref 3.5–5.3)
POTASSIUM SERPL-SCNC: 3.7 MMOL/L — SIGNIFICANT CHANGE UP (ref 3.5–5.3)
POTASSIUM SERPL-SCNC: 3.9 MMOL/L — SIGNIFICANT CHANGE UP (ref 3.5–5.3)
PROT SERPL-MCNC: 6.2 G/DL — SIGNIFICANT CHANGE UP (ref 6–8.3)
PROT SERPL-MCNC: 6.9 G/DL — SIGNIFICANT CHANGE UP (ref 6–8.3)
PROT UR-MCNC: 30 — SIGNIFICANT CHANGE UP
RBC # BLD: 2.18 M/UL — LOW (ref 3.8–5.2)
RBC # BLD: 2.68 M/UL — LOW (ref 3.8–5.2)
RBC # FLD: 18.6 % — HIGH (ref 10.3–14.5)
RBC # FLD: 18.8 % — HIGH (ref 10.3–14.5)
RBC CASTS # UR COMP ASSIST: SIGNIFICANT CHANGE UP (ref 0–?)
RETICS #: 104 K/UL — SIGNIFICANT CHANGE UP (ref 25–125)
RETICS #: 86 K/UL — SIGNIFICANT CHANGE UP (ref 25–125)
RETICS/RBC NFR: 3.9 % — HIGH (ref 0.5–2.5)
RETICS/RBC NFR: 4 % — HIGH (ref 0.5–2.5)
SODIUM SERPL-SCNC: 139 MMOL/L — SIGNIFICANT CHANGE UP (ref 135–145)
SODIUM SERPL-SCNC: 142 MMOL/L — SIGNIFICANT CHANGE UP (ref 135–145)
SP GR SPEC: 1.02 — SIGNIFICANT CHANGE UP (ref 1–1.04)
T4 FREE SERPL-MCNC: 1.02 NG/DL — SIGNIFICANT CHANGE UP (ref 0.9–1.8)
TRIGL SERPL-MCNC: 204 MG/DL — HIGH (ref 10–149)
TSH SERPL-MCNC: 3.14 UIU/ML — SIGNIFICANT CHANGE UP (ref 0.27–4.2)
UIBC SERPL-MCNC: 85.9 UG/DL — LOW (ref 110–370)
UROBILINOGEN FLD QL: 4 — SIGNIFICANT CHANGE UP
VIT B12 SERPL-MCNC: > 2000 PG/ML — HIGH (ref 200–900)
VIT B12 SERPL-MCNC: > 2000 PG/ML — HIGH (ref 200–900)
WBC # BLD: 15.11 K/UL — HIGH (ref 3.8–10.5)
WBC # BLD: 16.05 K/UL — HIGH (ref 3.8–10.5)
WBC # FLD AUTO: 15.11 K/UL — HIGH (ref 3.8–10.5)
WBC # FLD AUTO: 16.05 K/UL — HIGH (ref 3.8–10.5)
WBC UR QL: HIGH (ref 0–?)

## 2019-02-01 PROCEDURE — 99223 1ST HOSP IP/OBS HIGH 75: CPT

## 2019-02-01 PROCEDURE — 99223 1ST HOSP IP/OBS HIGH 75: CPT | Mod: GC

## 2019-02-01 PROCEDURE — 99233 SBSQ HOSP IP/OBS HIGH 50: CPT | Mod: GC

## 2019-02-01 RX ORDER — LABETALOL HCL 100 MG
5 TABLET ORAL ONCE
Qty: 0 | Refills: 0 | Status: COMPLETED | OUTPATIENT
Start: 2019-02-01 | End: 2019-02-01

## 2019-02-01 RX ORDER — AMLODIPINE BESYLATE 2.5 MG/1
5 TABLET ORAL DAILY
Qty: 0 | Refills: 0 | Status: DISCONTINUED | OUTPATIENT
Start: 2019-02-01 | End: 2019-02-01

## 2019-02-01 RX ORDER — ENOXAPARIN SODIUM 100 MG/ML
40 INJECTION SUBCUTANEOUS DAILY
Qty: 0 | Refills: 0 | Status: DISCONTINUED | OUTPATIENT
Start: 2019-02-01 | End: 2019-02-06

## 2019-02-01 RX ORDER — ACETAMINOPHEN 500 MG
650 TABLET ORAL EVERY 6 HOURS
Qty: 0 | Refills: 0 | Status: DISCONTINUED | OUTPATIENT
Start: 2019-02-01 | End: 2019-02-11

## 2019-02-01 RX ORDER — AMLODIPINE BESYLATE 2.5 MG/1
5 TABLET ORAL DAILY
Qty: 0 | Refills: 0 | Status: DISCONTINUED | OUTPATIENT
Start: 2019-02-01 | End: 2019-02-16

## 2019-02-01 RX ADMIN — Medication 650 MILLIGRAM(S): at 13:20

## 2019-02-01 RX ADMIN — Medication 650 MILLIGRAM(S): at 14:20

## 2019-02-01 RX ADMIN — AMLODIPINE BESYLATE 5 MILLIGRAM(S): 2.5 TABLET ORAL at 14:03

## 2019-02-01 RX ADMIN — PIPERACILLIN AND TAZOBACTAM 25 GRAM(S): 4; .5 INJECTION, POWDER, LYOPHILIZED, FOR SOLUTION INTRAVENOUS at 14:53

## 2019-02-01 RX ADMIN — Medication 5 MILLIGRAM(S): at 08:48

## 2019-02-01 RX ADMIN — Medication 250 MILLIGRAM(S): at 06:21

## 2019-02-01 RX ADMIN — Medication 1 TABLET(S): at 18:03

## 2019-02-01 RX ADMIN — Medication 650 MILLIGRAM(S): at 21:50

## 2019-02-01 RX ADMIN — Medication 1 TABLET(S): at 06:21

## 2019-02-01 RX ADMIN — PIPERACILLIN AND TAZOBACTAM 25 GRAM(S): 4; .5 INJECTION, POWDER, LYOPHILIZED, FOR SOLUTION INTRAVENOUS at 07:46

## 2019-02-01 RX ADMIN — Medication 650 MILLIGRAM(S): at 22:50

## 2019-02-01 NOTE — PHYSICAL THERAPY INITIAL EVALUATION ADULT - ADDITIONAL COMMENTS
Pt reports she had a recent fall down stairs and since then has not been ambulating at home. States friends/people have been coming in to assist her as needed.

## 2019-02-01 NOTE — PROGRESS NOTE ADULT - PROBLEM SELECTOR PLAN 2
- WBC 16, neutrophil predominance, , afebrile   - source likely fungating breast mass   - lactate 4.0 > 3.1. Repeat in AM   - blood, urine cx sent. Started on vanc/zosyn   - ID consult

## 2019-02-01 NOTE — CONSULT NOTE ADULT - ASSESSMENT
67 y/o Female no known pmhx presents with malodorous huge R fungating breast mass with SOB and weight loss (25 lbs in 2 mths) x several weeks. Pt does not go to the doctor and denies taking any meds but endorses that "God will take care of her". Has also reported dysuria/urgency on presentation. Currently not willing to answer any questions because of epigastric pain. On exam fungating right breast mass with foul odor, epigastric tenderness no rebound, refuses full exam. Afebrile, leukocytosis to 16 (same for 3 days), anemia, ALP elevated to 227. UA ? positive,   urine cx and blood cx are in progress.   Currently on Vancomycin and Zosyn. X ray with multiple lobular masses s/o metastasis. 69 y/o Female no known pmhx presents with malodorous huge R fungating breast mass with SOB and weight loss (25 lbs in 2 mths) x several weeks. Pt does not go to the doctor and denies taking any meds but endorses that "God will take care of her". Has also reported dysuria/urgency on presentation. Currently not willing to answer any questions because of epigastric pain. On exam fungating right breast mass with foul odor, epigastric tenderness no rebound, refuses full exam. Afebrile, leukocytosis to 16 (same for 3 days), anemia, ALP elevated to 227. UA ? positive,   urine cx and blood cx are in progress.   Currently on Vancomycin and Zosyn. X ray with multiple lobular masses s/o metastasis.     Right fungating breast mass - Ca breast , low suspicion of superimposed infection     Suggest:  * d/c antibiotics   * f/u urine cx - patient had reported dysuria initially, if cx positive, may need to treat   * leukocytosis : likely from underlying malignancy   *f/u blood cx

## 2019-02-01 NOTE — CONSULT NOTE ADULT - SUBJECTIVE AND OBJECTIVE BOX
Patient is a 68y old  Female who presents with a chief complaint of Symptomatic anemia, Rt Fungating Breast mass , (2019 11:38)    HPI:  67 y/o Female no known pmhx presents with malodorous huge R fungating breast mass with SOB and weight loss (25 lbs in 2 mths) x several weeks. Pt does not go to the doctor and denies taking any meds but endorses that "God will take care of her". Pt complains of falling on the stairs a week ago and  has  difficulty ambulating to bathroom due to weakness so has urinated on herself at times,  +dysuria and back pain that is non-radiating, Denies CP, N/V, abdominal pain, fever, constipation, dysphagia, BRBPR, hemoptysis. Pt c/o HA, Dizziness, weakness, dry Cough, + SOB, No Fever, + Diarrhea intermittent, decreased PO intake, + Urinary urgency, Pt's Sister  from breast cancer. Pt is accompanied with her daughter tonight, A+O x 3, EX Smoker, Pt refuses Blood transfusion for now, Hgb 6.8, Occult stool Neg., I requested Blood Cultures x 2 , Iron studies, Ferritin, Vit B12, Folate, LDH, Retic count, Haptoglobin, UA tonight, I called for surgery consult as well tonight, + elevated BLOOD Pressure tonight, Ct Chest/Abdomen/Pelvis/Head are ordered tonight, will start pt on IV zosyn and IV Vanco tonight due to Fungating , Necrotic Rt Breast Mass, + Leukocytosis, WBC 16.04, Lactate 4.0,     Labs: Lactate 4.0, Na 141, K+ 4.3, BUN 11, Creatinine 0.48, Glucose 75, alk Phos 220, AST 40, WBC 16.04, Hgb 6.8, Platelet 662, PT 14.5, INR 1.26, PTT 25.0, Occult stool Neg.,     Vitals: Tem 98.7, , /100, RR 20, 96% RA, (2019 22:23)      prior hospital charts reviewed [  ]  primary team notes reviewed [  ]  other consultant notes reviewed [  ]    PAST MEDICAL & SURGICAL HISTORY:  No pertinent past medical history  No significant past surgical history      Allergies  No Known Allergies        ANTIMICROBIALS (past 90 days)  MEDICATIONS  (STANDING):  piperacillin/tazobactam IVPB.   25 mL/Hr IV Intermittent (19 @ 07:46)    vancomycin  IVPB   250 mL/Hr IV Intermittent (19 @ 06:21)        ANTIMICROBIALS:    piperacillin/tazobactam IVPB. 3.375 every 8 hours  vancomycin  IVPB 1000 every 12 hours      OTHER MEDS: MEDICATIONS  (STANDING):  amLODIPine   Tablet 5 daily  enoxaparin Injectable 40 daily  influenza   Vaccine 0.5 once      SOCIAL HISTORY:   hx smoking  non-smoker    FAMILY HISTORY:  Family history of pancreatic cancer (Father)  Family history of breast cancer in sister      REVIEW OF SYSTEMS  [  ] ROS unobtainable because:    [  ] All other systems negative except as noted below:	    Constitutional:  [ ] fever [ ] chills  [ ] weight loss  [ ] weakness  Skin:  [ ] rash [ ] phlebitis	  Eyes: [ ] icterus [ ] pain  [ ] discharge	  ENMT: [ ] sore throat  [ ] thrush [ ] ulcers [ ] exudates  Respiratory: [ ] dyspnea [ ] hemoptysis [ ] cough [ ] sputum	  Cardiovascular:  [ ] chest pain [ ] palpitations [ ] edema	  Gastrointestinal:  [ ] nausea [ ] vomiting [ ] diarrhea [ ] constipation [ ] pain	  Genitourinary:  [ ] dysuria [ ] frequency [ ] hematuria [ ] discharge [ ] flank pain  [ ] incontinence  Musculoskeletal:  [ ] myalgias [ ] arthralgias [ ] arthritis  [ ] back pain  Neurological:  [ ] headache [ ] seizures  [ ] confusion/altered mental status  Psychiatric:  [ ] anxiety [ ] depression	  Hematology/Lymphatics:  [ ] lymphadenopathy  Endocrine:  [ ] adrenal [ ] thyroid  Allergic/Immunologic:	 [ ] transplant [ ] seasonal    Vital Signs Last 24 Hrs  T(F): 98.9 (19 @ 08:47), Max: 98.9 (19 @ 08:47)    Vital Signs Last 24 Hrs  HR: 102 (19 @ 08:47) (100 - 109)  BP: 181/98 (19 @ 08:47) (154/97 - 181/98)  RR: 18 (19 @ 08:47)  SpO2: 100% (19 @ 08:47) (96% - 100%)  Wt(kg): --    PHYSICAL EXAM:  General: non-toxic  HEAD/EYES: anicteric, PERRL  ENT:  supple  Cardiovascular:   S1, S2  Respiratory:  clear bilaterally  GI:  soft, non-tender, normal bowel sounds  :  no CVA tenderness   Musculoskeletal:  no synovitis  Neurologic:  grossly non-focal  Skin:  no rash  Lymph: no lymphadenopathy  Psychiatric:  appropriate affect  Vascular:  no phlebitis                                7.6    16.05 )-----------( 578      ( 2019 06:56 )             26.1     02-    139  |  102  |  11  ----------------------------<  82  3.9   |  21<L>  |  0.47<L>    Ca    8.6      2019 06:50  Phos  2.5     -  Mg     2.0     -    TPro  6.9  /  Alb  2.3<L>  /  TBili  0.4  /  DBili  x   /  AST  54<H>  /  ALT  5   /  AlkPhos  227<H>        Urinalysis Basic - ( 2019 10:25 )    Color: YELLOW / Appearance: TURBID / S.020 / pH: 5.5  Gluc: NEGATIVE / Ketone: NEGATIVE  / Bili: SMALL / Urobili: 4.0   Blood: MODERATE / Protein: 30 / Nitrite: NEGATIVE   Leuk Esterase: SMALL / RBC: 3-5 / WBC 6-10   Sq Epi: x / Non Sq Epi: FEW / Bacteria: MANY        MICROBIOLOGY:                            RADIOLOGY:  imaging below personally reviewed Patient is a 68y old  Female who presents with a chief complaint of Symptomatic anemia, Rt Fungating Breast mass , (2019 11:38)    HPI:  67 y/o Female no known pmhx presents with malodorous huge R fungating breast mass with SOB and weight loss (25 lbs in 2 mths) x several weeks. Pt does not go to the doctor and denies taking any meds but endorses that "God will take care of her". Pt complains of falling on the stairs a week ago and  has  difficulty ambulating to bathroom due to weakness so has urinated on herself at times,  +dysuria and back pain that is non-radiating, Denies CP, N/V, abdominal pain, fever, constipation, dysphagia, BRBPR, hemoptysis. Pt c/o HA, Dizziness, weakness, dry Cough, + SOB, No Fever, + Diarrhea intermittent, decreased PO intake, + Urinary urgency, Pt's Sister  from breast cancer. Pt is accompanied with her daughter tonight, A+O x 3, EX Smoker, Pt refuses Blood transfusion for now, Hgb 6.8, Occult stool Neg., I requested Blood Cultures x 2 , Iron studies, Ferritin, Vit B12, Folate, LDH, Retic count, Haptoglobin, UA tonight, I called for surgery consult as well tonight, + elevated BLOOD Pressure tonight, Ct Chest/Abdomen/Pelvis/Head are ordered tonight, will start pt on IV zosyn and IV Vanco tonight due to Fungating , Necrotic Rt Breast Mass, + Leukocytosis, WBC 16.04, Lactate 4.0,     Labs: Lactate 4.0, Na 141, K+ 4.3, BUN 11, Creatinine 0.48, Glucose 75, alk Phos 220, AST 40, WBC 16.04, Hgb 6.8, Platelet 662, PT 14.5, INR 1.26, PTT 25.0, Occult stool Neg.,     Vitals: Tem 98.7, , /100, RR 20, 96% RA, (2019 22:23)    Above reviewed. Patient reported pain in epigastric region.     prior hospital charts reviewed [x  ]  primary team notes reviewed [x  ]  other consultant notes reviewed [x  ]    PAST MEDICAL & SURGICAL HISTORY:  No pertinent past medical history  No significant past surgical history    Allergies  No Known Allergies    ANTIMICROBIALS (past 90 days)  MEDICATIONS  (STANDING):  piperacillin/tazobactam IVPB.   25 mL/Hr IV Intermittent (19 @ 07:46)    vancomycin  IVPB   250 mL/Hr IV Intermittent (19 @ 06:21)    ANTIMICROBIALS:    piperacillin/tazobactam IVPB. 3.375 every 8 hours  vancomycin  IVPB 1000 every 12 hours    OTHER MEDS: MEDICATIONS  (STANDING):  amLODIPine   Tablet 5 daily  enoxaparin Injectable 40 daily  influenza   Vaccine 0.5 once    SOCIAL HISTORY:   non smoker, no alcohol use    FAMILY HISTORY:  Family history of pancreatic cancer (Father)  Family history of breast cancer in sister    REVIEW OF SYSTEMS  [  ] ROS unobtainable because:    [ x ] All other systems negative except as noted below:	    Constitutional:  [ ] fever [ ] chills  [x ] weight loss  [x ] weakness [x] pain  [x] breast mass  Skin:  [ ] rash [ ] phlebitis	  Eyes: [ ] icterus [ ] pain  [ ] discharge	  ENMT: [ ] sore throat  [ ] thrush [ ] ulcers [ ] exudates  Respiratory: [ ] dyspnea [ ] hemoptysis [ ] cough [ ] sputum	  Cardiovascular:  [ ] chest pain [ ] palpitations [ ] edema	  Gastrointestinal:  [ ] nausea [ ] vomiting [ ] diarrhea [ ] constipation [ ] pain	  Genitourinary:  [ ] dysuria [ ] frequency [ ] hematuria [ ] discharge [ ] flank pain  [ ] incontinence  Musculoskeletal:  [ ] myalgias [ ] arthralgias [ ] arthritis  [ ] back pain  Neurological:  [ ] headache [ ] seizures  [ ] confusion/altered mental status  Psychiatric:  [ ] anxiety [ ] depression	  Hematology/Lymphatics:  [ ] lymphadenopathy  Endocrine:  [ ] adrenal [ ] thyroid  Allergic/Immunologic:	 [ ] transplant [ ] seasonal    Vital Signs Last 24 Hrs  T(F): 98.9 (19 @ 08:47), Max: 98.9 (19 @ 08:47)    Vital Signs Last 24 Hrs  HR: 102 (19 @ 08:47) (100 - 109)  BP: 181/98 (19 @ 08:47) (154/97 - 181/98)  RR: 18 (19 @ 08:47)  SpO2: 100% (19 @ 08:47) (96% - 100%)  Wt(kg): --    PHYSICAL EXAM:  General: non-toxic but appears to be in pain   HEAD/EYES: anicteric, PERRL  ENT:  supple  Breast exam: right breast fungating lesion, disfiguring and with fowl odor, no purulence   Cardiovascular:   S1, S2  Respiratory:  clear bilaterally  GI:  soft, mild epigastric tenderness   :  no CVA tenderness   Musculoskeletal:  no synovitis  Neurologic:  grossly non-focal  Skin:  no rash  Lymph: no lymphadenopathy  Psychiatric:  appropriate affect  Vascular:  no phlebitis                        7.6    16.05 )-----------( 578      ( 2019 06:56 )             26.1     02-    139  |  102  |  11  ----------------------------<  82  3.9   |  21<L>  |  0.47<L>    Ca    8.6      2019 06:50  Phos  2.5       Mg     2.0         TPro  6.9  /  Alb  2.3<L>  /  TBili  0.4  /  DBili  x   /  AST  54<H>  /  ALT  5   /  AlkPhos  227<H>        Urinalysis Basic - ( 2019 10:25 )    Color: YELLOW / Appearance: TURBID / S.020 / pH: 5.5  Gluc: NEGATIVE / Ketone: NEGATIVE  / Bili: SMALL / Urobili: 4.0   Blood: MODERATE / Protein: 30 / Nitrite: NEGATIVE   Leuk Esterase: SMALL / RBC: 3-5 / WBC 6-10   Sq Epi: x / Non Sq Epi: FEW / Bacteria: MANY        MICROBIOLOGY:  Blood cx and urine cx are pending     RADIOLOGY:  < from: Xray Chest 1 View- PORTABLE-Urgent (19 @ 20:13) >    IMPRESSION:   Multiple lobular masses in bilateral lungs likely pulmonary metastases.  Large opacified mass overlying right chest wall, axilla, and the right   arm.

## 2019-02-01 NOTE — PROGRESS NOTE ADULT - SUBJECTIVE AND OBJECTIVE BOX
INTERVAL HPI/OVERNIGHT EVENTS: admitted overnight for fall and fungating breast mass     SUBJECTIVE: Patient seen and examined at bedside. Patient reports that she had a fall 3 weeks ago. Fell down the stairs, hurt her back, no LOC, no head trauma. No falls since but has been feeling dizzy. Has not been able to ambulate for the past few days, people from her [employer] have been coming over to help her with her ADLs. Patient understands that her hemoglobin is dangerously low but refuses transfusions at this point, said she needs time to think about it. Has capacity and understanding. Also told that her breast cancer is beyond the point of cure but we can off palliative treatments. Patient endorses that "God has the power to heal her". Otherwise denies chest pain/shortness of breath/cough/fevers/abdominal pain/nausea/vomiting/dysuria.     ROS: otherwise negative     OBJECTIVE:  VITAL SIGNS:  T(C): 37.2 (2019 08:47), Max: 37.2 (2019 08:47)  T(F): 98.9 (2019 08:47), Max: 98.9 (2019 08:47)  HR: 102 (2019 08:47) (100 - 109)  BP: 181/98 (2019 08:47) (154/97 - 181/98)  RR: 18 (2019 08:47) (18 - 20)  SpO2: 100% (2019 08:47) (96% - 100%)    CAPILLARY BLOOD GLUCOSE    PHYSICAL EXAM:  General: elderly female appearing stated age. No acute distress. Breathing comfortably on room air.   Neuro: alert and oriented, no focal deficits, moves all extremities spontaneously. A&Ox4.   HEENT: anicteric, mucosa moist  Eyes: conjunctival pallor, PERRL  Respiratory: airway patent, respirations unlabored  Chest: large (~15cm) R breast fungating mass extending to axilla, superior to nipple, grossly malodorous without obvious bleeding or discharge  CVS: regular   Abdomen: soft, nontender, nondistended  Extremities: no edema, sensation and movement grossly intact  Skin: warm, dry, appropriate color    MEDICATIONS:  MEDICATIONS  (STANDING):  amLODIPine   Tablet 5 milliGRAM(s) Oral daily  enoxaparin Injectable 40 milliGRAM(s) SubCutaneous daily  influenza   Vaccine 0.5 milliLiter(s) IntraMuscular once  lactobacillus acidophilus 1 Tablet(s) Oral every 12 hours  piperacillin/tazobactam IVPB. 3.375 Gram(s) IV Intermittent every 8 hours  vancomycin  IVPB 1000 milliGRAM(s) IV Intermittent every 12 hours    MEDICATIONS  (PRN):    ALLERGIES:  Allergies    No Known Allergies    Intolerances    LABS:                        7.6    16.05 )-----------( 578      ( 2019 06:56 )             26.1         139  |  102  |  11  ----------------------------<  82  3.9   |  21<L>  |  0.47<L>    Ca    8.6      2019 06:50  Phos  2.5       Mg     2.0         TPro  6.9  /  Alb  2.3<L>  /  TBili  0.4  /  DBili  x   /  AST  54<H>  /  ALT  5   /  AlkPhos  227<H>      PT/INR - ( 2019 19:56 )   PT: 14.5 SEC;   INR: 1.26        PTT - ( 2019 19:56 )  PTT:25.0 SEC  Urinalysis Basic - ( 2019 10:25 )    Color: YELLOW / Appearance: TURBID / S.020 / pH: 5.5  Gluc: NEGATIVE / Ketone: NEGATIVE  / Bili: SMALL / Urobili: 4.0   Blood: MODERATE / Protein: 30 / Nitrite: NEGATIVE   Leuk Esterase: SMALL / RBC: x / WBC x   Sq Epi: x / Non Sq Epi: x / Bacteria: x    RADIOLOGY & ADDITIONAL TESTS: Reviewed.

## 2019-02-01 NOTE — CONSULT NOTE ADULT - ASSESSMENT
68F presenting with malodorous large right fungating breast mass with SOB and weight loss (25 lbs in 2 mths) x several weeks, concern for breast malignancy.     # Right breast mass: patient did not want it to be examined due to pain  Discussed that most likely cancerous and in order to treat would require at least a biopsy and then chemotherapy.  Patient clearly stated "I don't think I want chemotherapy."   Patient denied fears about diagnosis or treatment, but did state that her sister  of breast cancer in 2017.  When asked whether she was trying to hide the mass from her friends, she stated that "it was very obvious."   - addressed need for CT C/A/P to do proper staging and to see if disease was metastatic, but given that patient is hesitant about a biopsy and chemotherapy, would be of less utility.   - Recommend Palliative Care consult for GOC and pain control     Joanna Lau MD  Hematology/Oncology Fellow, PGY-4  pager: [phone]  After 5pm or on weekends, please page the on-call fellow.

## 2019-02-01 NOTE — ADVANCED PRACTICE NURSE CONSULT - REASON FOR CONSULT
Patient seen on skin care rounds after wound care referral received for assessment of skin impairment and recommendations of topical management. Chart reviewed: Serum albumin 2.1g/dL, Hgb/Hct 7.6/26.1, WBC 16.05, INR 1.26, Mayank 17, BMI 18.7kg/m2. Patient H/O malodorous huge R fungating breast mass with SOB and weight loss (25 lbs in 2 months). Pt does not go to the doctor and denies taking any meds but endorses that "God will take care of her". Pt complains of falling on the stairs a week ago and  has  difficulty ambulating to bathroom due to weakness so has urinated on herself at times. Admitted for mass of right breast. Patient with anemia, refusing blood transfusion initially. Seen by surgery service (see consult for details and plan), hematology/oncology team who is recommending palliative care and ID for fungating breast mass recommend discontinuing antibiotics and continue with cancer workup.

## 2019-02-01 NOTE — PROGRESS NOTE ADULT - PROBLEM SELECTOR PLAN 4
- Hgb 6.8, repeat 7.6   - pt has capacity and refuses PRBC for now, ECG, + SOB, HA, Dizziness, Weakness,   - Likely anemia of chronic disease given Iron Studies, ferritin, Vit B12, Folate, LDH, Haptoglobin, Retic count

## 2019-02-01 NOTE — ADVANCED PRACTICE NURSE CONSULT - ASSESSMENT
A&Ox3, incontinent of urine at times, continent of stool. Skin warm, dry with increased moisture in intertriginous folds, adequate skin turgor.    Patient reports a large amount of drainage that is odorous from wound. She cleanses it at home with soap and water and leaves it open to air.    Right scapula, abrasion secondary to fall at home: measures 3cmx1.5cmx0.1cm. 100% exposed dermis. Scant serous drainage, no odor.    Right breast: fungating mass entire area of palpable mass measures [id number] and is raised 5cm from skin level. Open area at proximal end of mass that measures [id number] with 100% fungating tissue. Large amount of serosanguinous drainage. (+) odor. At 5 o'clock from open wound is a satellite open area that is 0.7cm and measures 2.5cmx2.5cm and is also 100% fungating yellow, moist tissue. Multiple raised nodules at 9 o'clock and then again distal to nipple. Periwound skin no induration, no increased warmth, no erythema. Goal of care: odor control, manage exudate, decrease/control bioburden, continue follow up care with hematology/oncology services for cancer workup.    Discussed topical management with patient for odor and drainage control.

## 2019-02-01 NOTE — PHYSICAL THERAPY INITIAL EVALUATION ADULT - PERTINENT HX OF CURRENT PROBLEM, REHAB EVAL
67 y/o Female with poor medical care and no known PMH presents with multiple months of growing, fungating, malodorous right breast mass, fall, and dizziness. w/u for metastatic breast cancer. Found to be severely anemic on lab work.

## 2019-02-01 NOTE — CONSULT NOTE ADULT - SUBJECTIVE AND OBJECTIVE BOX
HPI:  69 y/o Female no known pmhx presents with malodorous huge R fungating breast mass with SOB and weight loss (25 lbs in 2 mths) x several weeks. Pt does not go to the doctor and denies taking any meds but endorses that "God will take care of her". Pt complains of falling on the stairs a week ago and  has  difficulty ambulating to bathroom due to weakness so has urinated on herself at times,  +dysuria and back pain that is non-radiating, Denies CP, N/V, abdominal pain, fever, constipation, dysphagia, BRBPR, hemoptysis. Pt c/o HA, Dizziness, weakness, dry Cough, + SOB, No Fever, + Diarrhea intermittent, decreased PO intake, + Urinary urgency, Pt's Sister  from breast cancer. Pt is accompanied with her daughter tonight, A+O x 3, EX Smoker, Pt refuses Blood transfusion for now, Hgb 6.8, Occult stool Neg., I requested Blood Cultures x 2 , Iron studies, Ferritin, Vit B12, Folate, LDH, Retic count, Haptoglobin, UA tonight, I called for surgery consult as well tonight, + elevated BLOOD Pressure tonight, Ct Chest/Abdomen/Pelvis/Head are ordered tonight, will start pt on IV zosyn and IV Vanco tonight due to Fungating , Necrotic Rt Breast Mass, + Leukocytosis, WBC 16.04, Lactate 4.0,     Labs: Lactate 4.0, Na 141, K+ 4.3, BUN 11, Creatinine 0.48, Glucose 75, alk Phos 220, AST 40, WBC 16.04, Hgb 6.8, Platelet 662, PT 14.5, INR 1.26, PTT 25.0, Occult stool Neg.,     Vitals: Tem 98.7, , /100, RR 20, 96% RA, (2019 22:23)      PAST MEDICAL & SURGICAL HISTORY:  No pertinent past medical history  No significant past surgical history      Allergies    No Known Allergies    Intolerances        MEDICATIONS  (STANDING):  amLODIPine   Tablet 5 milliGRAM(s) Oral daily  enoxaparin Injectable 40 milliGRAM(s) SubCutaneous daily  influenza   Vaccine 0.5 milliLiter(s) IntraMuscular once  lactobacillus acidophilus 1 Tablet(s) Oral every 12 hours  piperacillin/tazobactam IVPB. 3.375 Gram(s) IV Intermittent every 8 hours  vancomycin  IVPB 1000 milliGRAM(s) IV Intermittent every 12 hours    MEDICATIONS  (PRN):      FAMILY HISTORY:  Family history of pancreatic cancer (Father)  Family history of breast cancer in sister      SOCIAL HISTORY: No EtOH, no tobacco    REVIEW OF SYSTEMS:    CONSTITUTIONAL: No weakness, fevers or chills  EYES/ENT: No visual changes;  No vertigo or throat pain   NECK: No pain or stiffness  RESPIRATORY: No cough, wheezing, hemoptysis; No shortness of breath  CARDIOVASCULAR: No chest pain or palpitations  GASTROINTESTINAL: No abdominal or epigastric pain. No nausea, vomiting, or hematemesis; No diarrhea or constipation. No melena or hematochezia.  GENITOURINARY: No dysuria, frequency or hematuria  NEUROLOGICAL: No numbness or weakness  SKIN: No itching, burning, rashes, or lesions   All other review of systems is negative unless indicated above.      Height (cm): 167.64 ( @ 23:30)  Weight (kg): 52.6 ( 23:30)  BMI (kg/m2): 18.7 ( 23:30)  BSA (m2): 1.59 ( @ 23:30)      T(F): 98.7 (19 @ 06:20), Max: 98.7 (19 @ 20:42)  HR: 102 (19 @ 08:47)  BP: 181/98 (19 @ 08:47)  RR: 18 (19 @ 06:20)  SpO2: 99% (19 @ 06:20)      GENERAL: NAD, well-developed  HEAD:  Atraumatic, Normocephalic  EYES: EOMI, PERRLA, conjunctiva and sclera clear  NECK: Supple, No JVD  CHEST/LUNG: Clear to auscultation bilaterally; No wheeze  HEART: Regular rate and rhythm; No murmurs, rubs, or gallops  ABDOMEN: Soft, Nontender, Nondistended; Bowel sounds present  EXTREMITIES:  2+ Peripheral Pulses, No clubbing, cyanosis, or edema  NEUROLOGY: non-focal  SKIN: No rashes or lesions                          7.6    16.05 )-----------( [id number]      ( 2019 06:56 )             26.1       02-01    139  |  102  |  11  ----------------------------<  82  3.9   |  21<L>  |  0.47<L>    Ca    8.6      2019 06:50  Phos  2.5       Mg     2.0         TPro  6.9  /  Alb  2.3<L>  /  TBili  0.4  /  DBili  x   /  AST  54<H>  /  ALT  5   /  AlkPhos  227<H>        Phosphorus Level, Serum: 2.5 mg/dL ( @ 06:50)  Magnesium, Serum: 2.0 mg/dL ( @ 06:50)  Lactate Dehydrogenase, Serum: > 1800 U/L ( @ 06:50)  Lactate Dehydrogenase, Serum: > 1800 U/L ( @ 01:10)  Phosphorus Level, Serum: 2.5 mg/dL ( @ 01:10)  Magnesium, Serum: 1.9 mg/dL ( @ 01:10)  Phosphorus Level, Serum: 2.9 mg/dL ( @ 19:56)  Magnesium, Serum: 2.1 mg/dL ( @ 19:56)      PT/INR - ( 2019 19:56 )   PT: 14.5 SEC;   INR: 1.26          PTT - ( 2019 19:56 )  PTT:25.0 SEC HPI:  68F presenting with malodorous large right fungating breast mass with SOB and weight loss (25 lbs in 2 mths) x several weeks. Pt does not go to the doctor and denies taking any meds but endorses that "God will take care of her". Pt complains of falling on the stairs a week ago and  has difficulty ambulating to bathroom due to weakness so has urinated on herself at times,  +dysuria and back pain that is non-radiating.      Patient reports that her sister had breast cancer, was treated with chemotherapy, passed away in 2017.  Reports her last mammogram was "ages ago."  States that her breast mass was obvious to everyone, but she did not want to seek any medical attention.  Not clear on reasons why, but denied being scared.  Lives at home alone, used to cook for herselh and go out a few times a week to see friends, but it has been difficult since her fall.   Endorses pain near the mass, worse with breathing.       PAST MEDICAL & SURGICAL HISTORY:  No pertinent past medical history  No significant past surgical history      Allergies  No Known Allergies        MEDICATIONS  (STANDING):  amLODIPine   Tablet 5 milliGRAM(s) Oral daily  enoxaparin Injectable 40 milliGRAM(s) SubCutaneous daily  influenza   Vaccine 0.5 milliLiter(s) IntraMuscular once  lactobacillus acidophilus 1 Tablet(s) Oral every 12 hours  piperacillin/tazobactam IVPB. 3.375 Gram(s) IV Intermittent every 8 hours  vancomycin  IVPB 1000 milliGRAM(s) IV Intermittent every 12 hours    MEDICATIONS  (PRN):      FAMILY HISTORY:  Family history of pancreatic cancer (Father)  Family history of breast cancer in sister      SOCIAL HISTORY: No EtOH, no tobacco      REVIEW OF SYSTEMS:  CONSTITUTIONAL: +PAIN, No weakness, fevers or chills  EYES/ENT: No visual changes;  No vertigo or throat pain   NECK: No pain or stiffness  RESPIRATORY: No cough, wheezing, hemoptysis; No shortness of breath  CARDIOVASCULAR: No chest pain or palpitations  GASTROINTESTINAL: No abdominal or epigastric pain. No nausea, vomiting, or hematemesis; No diarrhea or constipation. No melena or hematochezia.  GENITOURINARY: No dysuria, frequency or hematuria  NEUROLOGICAL: No numbness or weakness  SKIN: No itching, burning, rashes, or lesions   All other review of systems is negative unless indicated above.      Height (cm): 167.64 (01-31 @ 23:30)  Weight (kg): 52.6 (01-31 @ 23:30)  BMI (kg/m2): 18.7 (01-31 @ 23:30)  BSA (m2): 1.59 (01-31 @ 23:30)      T(F): 98.7 (02-01-19 @ 06:20), Max: 98.7 (01-31-19 @ 20:42)  HR: 102 (02-01-19 @ 08:47)  BP: 181/98 (02-01-19 @ 08:47)  RR: 18 (02-01-19 @ 06:20)  SpO2: 99% (02-01-19 @ 06:20)      GENERAL: uncomfortable due to pain  HEAD:  Atraumatic, Normocephalic  EYES: EOMI, conjunctiva and sclera clear  NECK: Supple  CHEST/LUNG: +RIGHT FUNGATIN BREAST MASS, malodorous, did not want it to be examined; lungs clear to auscultation bilaterally; No wheezes or crackles   HEART: Regular rate and rhythm; No murmurs, rubs, or gallops  ABDOMEN: Soft, Nontender, Nondistended  EXTREMITIES: No clubbing, cyanosis, or edema  NEUROLOGY: non-focal  SKIN: No rashes or lesions                          7.6    16.05 )-----------( 578      ( 01 Feb 2019 06:56 )             26.1       02-01    139  |  102  |  11  ----------------------------<  82  3.9   |  21<L>  |  0.47<L>    Ca    8.6      01 Feb 2019 06:50  Phos  2.5     02-01  Mg     2.0     02-01    TPro  6.9  /  Alb  2.3<L>  /  TBili  0.4  /  DBili  x   /  AST  54<H>  /  ALT  5   /  AlkPhos  227<H>  02-01      Phosphorus Level, Serum: 2.5 mg/dL (02-01 @ 06:50)  Magnesium, Serum: 2.0 mg/dL (02-01 @ 06:50)  Lactate Dehydrogenase, Serum: > 1800 U/L (02-01 @ 06:50)  Lactate Dehydrogenase, Serum: > 1800 U/L (02-01 @ 01:10)  Phosphorus Level, Serum: 2.5 mg/dL (02-01 @ 01:10)  Magnesium, Serum: 1.9 mg/dL (02-01 @ 01:10)  Phosphorus Level, Serum: 2.9 mg/dL (01-31 @ 19:56)  Magnesium, Serum: 2.1 mg/dL (01-31 @ 19:56)      PT/INR - ( 31 Jan 2019 19:56 )   PT: 14.5 SEC;   INR: 1.26          PTT - ( 31 Jan 2019 19:56 )  PTT:25.0 SEC

## 2019-02-01 NOTE — PROGRESS NOTE ADULT - ATTENDING COMMENTS
Patient here for malodorous large right fungating breast mass with SOB and weight loss (25 lbs in 2 mths) x several weeks found to be anemic with a UTI, continue IV vanco, aztreonam, blood cx pending, will obtain GS/cx of mass, patient will likely benefit from palliative mastectomy, deferring her decision at this time, Oncology recommending palliative care consult for GOC and pain control, ID to help guide abx selection.

## 2019-02-01 NOTE — ADVANCED PRACTICE NURSE CONSULT - RECOMMEDATIONS
Recommend follow up care at [facility] ([phone], [address] [place] [address]) or oncologist for wound care.    Recommend VNS for wound care assistance at home.    Right breast: Cleanse with 0.125% Dakins, pat dry. Apply Liquid barrier film to periwound skin. Apply crushed metronidazole pills to open wound, cover with Aquacel hydrofiber. Cover with foam without border and secure with Spandage tubular netting. Change daily. *If there is a large amount of strike through from drainage, change twice a day.  *If drainage decrease can change every other day at home.    Right scapula: Cleanse with NS, pat dry. Apply Liquid barrier film to periwound skin. Apply Foam with border. Change every 3 days.    Continue low air loss bed therapy, continue heel elevation, continue to turn & reposition q2h, continue moisture management with barrier creams & single breathable pad, continue measures to decrease friction/shear/pressure.       Please call wound care service line is further assistance is needed ([phone]).

## 2019-02-01 NOTE — PROGRESS NOTE ADULT - PROBLEM SELECTOR PLAN 8
DVT PPx: lovenox 40mg QD   Diet: DASH diet  Dispo: pending workup of breast mass   GOC: patient was daughter (Sandy) and co- (Adriano Galan) to be her HCP. Full code.     Christophe Dover, PGY1   Pager [phone]

## 2019-02-01 NOTE — PHYSICAL THERAPY INITIAL EVALUATION ADULT - LEVEL OF INDEPENDENCE: SUPINE/SIT, REHAB EVAL
Pt declined functional mobility assessment despite encouragement, pt states she sat on edge of bed earlier today.

## 2019-02-01 NOTE — PHYSICAL THERAPY INITIAL EVALUATION ADULT - GENERAL OBSERVATIONS, REHAB EVAL
Pt received lying in bed, appeared to have dressing to Right breast under gown.  Pt agreed to PT evaluation however declined functional mobility assessment 2/2 had received pain medication and is finally able to rest

## 2019-02-01 NOTE — PROGRESS NOTE ADULT - PROBLEM SELECTOR PLAN 1
- patient is  in [employer]  - Has capacity. Strong mariola that God will heal her  - currently refuse blood transfusions and breast ca treatment   - will attempt to reach her co- Adriano Chance to further understand beliefs

## 2019-02-01 NOTE — PROGRESS NOTE ADULT - ASSESSMENT
68yoF with poor medical care and no known PMH presents with multiple months of growing, fungating, malodorous right breast mass, fall and dizziness. Likely metastatic breast cancer. Found to be severely anemic on lab work.

## 2019-02-01 NOTE — PROGRESS NOTE ADULT - PROBLEM SELECTOR PLAN 6
- stable in ~200s   - likely in the setting of metastatic breast ca to bone  - will trend tomorrow AM and also get ggt

## 2019-02-02 LAB
ALBUMIN SERPL ELPH-MCNC: 2.1 G/DL — LOW (ref 3.3–5)
ALP SERPL-CCNC: 246 U/L — HIGH (ref 40–120)
ALT FLD-CCNC: 6 U/L — SIGNIFICANT CHANGE UP (ref 4–33)
ANION GAP SERPL CALC-SCNC: 14 MMO/L — SIGNIFICANT CHANGE UP (ref 7–14)
AST SERPL-CCNC: 71 U/L — HIGH (ref 4–32)
BILIRUB SERPL-MCNC: 0.4 MG/DL — SIGNIFICANT CHANGE UP (ref 0.2–1.2)
BUN SERPL-MCNC: 9 MG/DL — SIGNIFICANT CHANGE UP (ref 7–23)
CALCIUM SERPL-MCNC: 8.9 MG/DL — SIGNIFICANT CHANGE UP (ref 8.4–10.5)
CHLORIDE SERPL-SCNC: 104 MMOL/L — SIGNIFICANT CHANGE UP (ref 98–107)
CO2 SERPL-SCNC: 22 MMOL/L — SIGNIFICANT CHANGE UP (ref 22–31)
CREAT SERPL-MCNC: 0.48 MG/DL — LOW (ref 0.5–1.3)
GGT SERPL-CCNC: 67 U/L — HIGH (ref 5–36)
GLUCOSE SERPL-MCNC: 76 MG/DL — SIGNIFICANT CHANGE UP (ref 70–99)
HCT VFR BLD CALC: 23.5 % — LOW (ref 34.5–45)
HCV RNA FLD QL NAA+PROBE: SIGNIFICANT CHANGE UP
HCV RNA FLD QL NAA+PROBE: SIGNIFICANT CHANGE UP
HCV RNA SPEC QL PROBE+SIG AMP: NOT DETECTED — SIGNIFICANT CHANGE UP
HCV RNA SPEC QL PROBE+SIG AMP: NOT DETECTED — SIGNIFICANT CHANGE UP
HGB BLD-MCNC: 6.7 G/DL — CRITICAL LOW (ref 11.5–15.5)
HIV 1+2 AB+HIV1 P24 AG SERPL QL IA: SIGNIFICANT CHANGE UP
LACTATE SERPL-SCNC: 3.6 MMOL/L — HIGH (ref 0.5–2)
MAGNESIUM SERPL-MCNC: 1.9 MG/DL — SIGNIFICANT CHANGE UP (ref 1.6–2.6)
MCHC RBC-ENTMCNC: 27.7 PG — SIGNIFICANT CHANGE UP (ref 27–34)
MCHC RBC-ENTMCNC: 28.5 % — LOW (ref 32–36)
MCV RBC AUTO: 97.1 FL — SIGNIFICANT CHANGE UP (ref 80–100)
NRBC # FLD: 0.06 K/UL — LOW (ref 25–125)
PHOSPHATE SERPL-MCNC: 3.1 MG/DL — SIGNIFICANT CHANGE UP (ref 2.5–4.5)
PLATELET # BLD AUTO: 576 K/UL — HIGH (ref 150–400)
PMV BLD: 9.2 FL — SIGNIFICANT CHANGE UP (ref 7–13)
POTASSIUM SERPL-MCNC: 3.7 MMOL/L — SIGNIFICANT CHANGE UP (ref 3.5–5.3)
POTASSIUM SERPL-SCNC: 3.7 MMOL/L — SIGNIFICANT CHANGE UP (ref 3.5–5.3)
PROT SERPL-MCNC: 6.5 G/DL — SIGNIFICANT CHANGE UP (ref 6–8.3)
RBC # BLD: 2.42 M/UL — LOW (ref 3.8–5.2)
RBC # FLD: 18.7 % — HIGH (ref 10.3–14.5)
SODIUM SERPL-SCNC: 140 MMOL/L — SIGNIFICANT CHANGE UP (ref 135–145)
SPECIMEN SOURCE: SIGNIFICANT CHANGE UP
WBC # BLD: 19.51 K/UL — HIGH (ref 3.8–10.5)
WBC # FLD AUTO: 19.51 K/UL — HIGH (ref 3.8–10.5)

## 2019-02-02 PROCEDURE — 74177 CT ABD & PELVIS W/CONTRAST: CPT | Mod: 26

## 2019-02-02 PROCEDURE — 99233 SBSQ HOSP IP/OBS HIGH 50: CPT | Mod: GC

## 2019-02-02 PROCEDURE — 99232 SBSQ HOSP IP/OBS MODERATE 35: CPT

## 2019-02-02 PROCEDURE — 93010 ELECTROCARDIOGRAM REPORT: CPT

## 2019-02-02 PROCEDURE — 70450 CT HEAD/BRAIN W/O DYE: CPT | Mod: 26

## 2019-02-02 PROCEDURE — 71275 CT ANGIOGRAPHY CHEST: CPT | Mod: 26

## 2019-02-02 PROCEDURE — 93306 TTE W/DOPPLER COMPLETE: CPT | Mod: 26

## 2019-02-02 RX ORDER — CEFTRIAXONE 500 MG/1
INJECTION, POWDER, FOR SOLUTION INTRAMUSCULAR; INTRAVENOUS
Qty: 0 | Refills: 0 | Status: COMPLETED | OUTPATIENT
Start: 2019-02-02 | End: 2019-02-05

## 2019-02-02 RX ORDER — CEFTRIAXONE 500 MG/1
1 INJECTION, POWDER, FOR SOLUTION INTRAMUSCULAR; INTRAVENOUS EVERY 24 HOURS
Qty: 0 | Refills: 0 | Status: COMPLETED | OUTPATIENT
Start: 2019-02-03 | End: 2019-02-04

## 2019-02-02 RX ORDER — SODIUM HYPOCHLORITE 0.125 %
1 SOLUTION, NON-ORAL MISCELLANEOUS DAILY
Qty: 0 | Refills: 0 | Status: DISCONTINUED | OUTPATIENT
Start: 2019-02-02 | End: 2019-02-04

## 2019-02-02 RX ORDER — CEFTRIAXONE 500 MG/1
1 INJECTION, POWDER, FOR SOLUTION INTRAMUSCULAR; INTRAVENOUS ONCE
Qty: 0 | Refills: 0 | Status: COMPLETED | OUTPATIENT
Start: 2019-02-02 | End: 2019-02-02

## 2019-02-02 RX ADMIN — Medication 1 TABLET(S): at 05:54

## 2019-02-02 RX ADMIN — Medication 1 TABLET(S): at 17:15

## 2019-02-02 RX ADMIN — CEFTRIAXONE 100 GRAM(S): 500 INJECTION, POWDER, FOR SOLUTION INTRAMUSCULAR; INTRAVENOUS at 11:29

## 2019-02-02 RX ADMIN — AMLODIPINE BESYLATE 5 MILLIGRAM(S): 2.5 TABLET ORAL at 05:54

## 2019-02-02 RX ADMIN — ENOXAPARIN SODIUM 40 MILLIGRAM(S): 100 INJECTION SUBCUTANEOUS at 11:29

## 2019-02-02 NOTE — PROGRESS NOTE ADULT - ATTENDING COMMENTS
daughter at bedside this morning  pt agreeable to transfusion  sepsis jackie to E-coli UTI, on Rocephin, monitor WBC  tachy, with underlying malignancy - PE on differential - would change CT to PE protocol if pt agreeable and also evaluate for staging.   at this time doesn't appear agreeable to bx / sx  palliative follow up daughter at bedside this morning  pt agreeable to transfusion  sepsis due to E-coli UTI, on Rocephin, monitor WBC  tachy, with underlying malignancy - PE on differential - would change CT to PE protocol if pt agreeable and also evaluate for staging.   at this time doesn't appear agreeable to bx / sx  palliative follow up

## 2019-02-02 NOTE — PROGRESS NOTE ADULT - SUBJECTIVE AND OBJECTIVE BOX
68yPatient is a 68y old  Female who presents with a chief complaint of Symptomatic anemia, Rt Fungating Breast mass , (02 Feb 2019 09:37)      Interval history:  F/up for leukocytosis and to f/up cultures.  Urine culture with E coli. Urine may have been contaminated with feces.  Pt in bed and is comfortable. Denies pain. Daughter at bedside.  No f/c/n/v/d. +constipation now resolved.        Antimicrobials:    cefTRIAXone   IVPB          Vital Signs Last 24 Hrs  T(C): 36.7 (02-02-19 @ 15:47), Max: 37.3 (02-02-19 @ 13:45)  T(F): 98 (02-02-19 @ 15:47), Max: 99.2 (02-02-19 @ 13:45)  HR: 114 (02-02-19 @ 15:47) (99 - 114)  BP: 154/98 (02-02-19 @ 15:47) (148/96 - 164/97)  BP(mean): --  RR: 18 (02-02-19 @ 15:47) (17 - 18)  SpO2: 97% (02-02-19 @ 15:47) (97% - 98%)    PHYSICAL EXAM:  General: non-toxic, weak appearing.  HEAD/EYES: anicteric, PERRL  ENT:  supple  Breast exam: right breast fungating lesion, disfiguring and with fowl odor, drainage  Cardiovascular:   S1, S2  Respiratory:  clear bilaterally  GI:  soft, mild epigastric tenderness   :  no CVA tenderness   Musculoskeletal:  no synovitis  Neurologic:  grossly non-focal  Skin:  no rash  Lymph: no lymphadenopathy  Psychiatric:  appropriate affect  Vascular:  no phlebitis                        6.7    19.51 )-----------( 576      ( 02 Feb 2019 06:40 )             23.5   02-02    140  |  104  |  9   ----------------------------<  76  3.7   |  22  |  0.48<L>    Ca    8.9      02 Feb 2019 06:40  Phos  3.1     02-02  Mg     1.9     02-02    TPro  6.5  /  Alb  2.1<L>  /  TBili  0.4  /  DBili  x   /  AST  71<H>  /  ALT  6   /  AlkPhos  246<H>  02-02      LIVER FUNCTIONS - ( 02 Feb 2019 06:40 )  Alb: 2.1 g/dL / Pro: 6.5 g/dL / ALK PHOS: 246 u/L / ALT: 6 u/L / AST: 71 u/L / GGT: 67 u/L         RECENT CULTURES:    Culture - Urine in AM (02.01.19 @ 11:07)    Culture - Urine:   EC^Escherichia coli  COLONY COUNT: > = 100,000 CFU/ML    Specimen Source: URINE MIDSTREAM    Culture - Blood (02.01.19 @ 01:37)    Culture - Blood:   NO ORGANISMS ISOLATED  NO ORGANISMS ISOLATED AT 24 HOURS    Specimen Source: BLOOD VENOUS    Culture - Blood (02.01.19 @ 01:37)    Culture - Blood:   NO ORGANISMS ISOLATED  NO ORGANISMS ISOLATED AT 24 HOURS    Specimen Source: BLOOD PERIPHERAL        Radiology:    < from: Xray Chest 1 View- PORTABLE-Urgent (01.31.19 @ 20:13) >  IMPRESSION:   Multiple lobular masses in bilateral lungs likely pulmonary metastases.  Large opacified mass overlying right chest wall, axilla, and the right   arm.     < end of copied text >

## 2019-02-02 NOTE — PROGRESS NOTE ADULT - SUBJECTIVE AND OBJECTIVE BOX
INTERVAL HPI/OVERNIGHT EVENTS: no acute events overnight    SUBJECTIVE: Patient seen and examined at bedside. Patient reports feeling short of breath and having decreased appetite. Patient OK with receiving blood transfusion today afternoon. Said she still needs time to think about the CT scan, surgery and chemotherapy. No chest pain/abdominal pain/nausea/vomiting    ROS: Otherwise negative     OBJECTIVE:    VITAL SIGNS:  T(C): 36.6 (2019 05:56), Max: 37.1 (2019 14:03)  T(F): 97.9 (2019 05:56), Max: 98.7 (2019 14:03)  HR: 102 (2019 05:56) (99 - 110)  BP: 164/97 (2019 05:56) (158/99 - 169/99)  RR: 17 (2019 05:56) (17 - 18)  SpO2: 98% (2019 05:56) (97% - 99%)    02- @ 07:01  -  02-02 @ 07:00  --------------------------------------------------------  IN: 0 mL / OUT: 200 mL / NET: -200 mL    PHYSICAL EXAM:  General: elderly female appearing stated age. No acute distress. Mildly tachypneic   Neuro: alert and oriented, no focal deficits, moves all extremities spontaneously. A&Ox4.   Eyes: conjunctival pallor  Respiratory: speaking in full sentences. Mildly tachypneic. Left lung clear to auscultation.   Chest: large (~15cm) R breast fungating mass covered with pink dressing.   CVS: tachycardic. S1, S2. No murmurs/rubs/gallops.  Abdomen: soft, nontender, nondistended  Extremities: no edema, sensation and movement grossly intact  Skin: warm, dry, appropriate color    MEDICATIONS:  MEDICATIONS  (STANDING):  amLODIPine   Tablet 5 milliGRAM(s) Oral daily  enoxaparin Injectable 40 milliGRAM(s) SubCutaneous daily  influenza   Vaccine 0.5 milliLiter(s) IntraMuscular once  lactobacillus acidophilus 1 Tablet(s) Oral every 12 hours    MEDICATIONS  (PRN):  acetaminophen   Tablet .. 650 milliGRAM(s) Oral every 6 hours PRN Mild Pain (1 - 3), Moderate Pain (4 - 6)    ALLERGIES:  Allergies    No Known Allergies    Intolerances    LABS:                        6.7    19.51 )-----------( 576      ( 2019 06:40 )             23.5         140  |  104  |  9   ----------------------------<  76  3.7   |  22  |  0.48<L>    Ca    8.9      2019 06:40  Phos  3.1       Mg     1.9         TPro  6.5  /  Alb  2.1<L>  /  TBili  0.4  /  DBili  x   /  AST  71<H>  /  ALT  6   /  AlkPhos  246<H>      PT/INR - ( 2019 19:56 )   PT: 14.5 SEC;   INR: 1.26        PTT - ( 2019 19:56 )  PTT:25.0 SEC  Urinalysis Basic - ( 2019 10:25 )    Color: YELLOW / Appearance: TURBID / S.020 / pH: 5.5  Gluc: NEGATIVE / Ketone: NEGATIVE  / Bili: SMALL / Urobili: 4.0   Blood: MODERATE / Protein: 30 / Nitrite: NEGATIVE   Leuk Esterase: SMALL / RBC: 3-5 / WBC 6-10   Sq Epi: x / Non Sq Epi: FEW / Bacteria: MANY    RADIOLOGY & ADDITIONAL TESTS: Reviewed.

## 2019-02-02 NOTE — PROGRESS NOTE ADULT - PROBLEM SELECTOR PLAN 2
- WBC 16, neutrophil predominance, , afebrile   - source likely fungating breast mass   - lactate 4.0 > 3.1  3.6. Likely secondary to metastatic breast ca   - ID thinks likely secondary to cancer and not infection  - observe off antibiotics - WBC 16, neutrophil predominance, , afebrile   - lactate 4.0 > 3.1  3.6. Likely secondary to metastatic breast ca   - blood cx negative   - ID thinks likely secondary to cancer and not infection  - observe off antibiotics

## 2019-02-02 NOTE — PROGRESS NOTE ADULT - PROBLEM SELECTOR PLAN 4
- Hgb 6.7 today AM  - Likely anemia of chronic disease given Iron Studies, ferritin, Vit B12, Folate, LDH, Haptoglobin, Retic count  - patient agrees to blood transfusion but only during afternoon. Does not want it in the AM.   - will transfuse 1U pRBC today afternoon

## 2019-02-02 NOTE — PROGRESS NOTE ADULT - PROBLEM SELECTOR PLAN 8
DVT PPx: lovenox 40mg QD   Diet: DASH diet  Dispo: pending workup of breast mass   GOC: patient was daughter (Sandy) and co- (Adriano Galan) to be her HCP. Full code.     Christophe Dover, PGY1   Pager [phone]

## 2019-02-02 NOTE — PROGRESS NOTE ADULT - PROBLEM SELECTOR PLAN 1
- patient is  in [employer]  - Has capacity. Strong mariola that God will heal her  - currently agrees to blood transfusions but still undecided about CT scan, surgery and chemotherapy

## 2019-02-02 NOTE — PROGRESS NOTE ADULT - ASSESSMENT
69 y/o Female no known pmhx presents with malodorous huge R fungating breast mass with SOB and weight loss (25 lbs in 2 mths) x several weeks. Has also reported dysuria/urgency on presentation.   Afebrile, leukocytosis increased to 19, anemia, ALP elevated to 227. UA mildly positive. Urine culture with E coli.    Right fungating breast mass - Ca breast , could have superimposed infection at skin and soft tissue from fungating mass.  +UTI     Suggest:  * can continue ceftriaxone- started by primary team. See if WBC improves. Plan short course for UTI.  * F/up blood cultures and urine culture sensi.    *d/w daughter at bedside  *plan for breast ca as per primary team    Call ID with questions this weekend    Genoveva Emmanuel MD  [phone] (pager)  [phone] (office)

## 2019-02-02 NOTE — PROGRESS NOTE ADULT - PROBLEM SELECTOR PLAN 3
- Rt Breast Fungating, Necrotic Mass, + Malodorous, + Drainage. Likely metastatic breast cancer   - Surgery recommended palliative mastectomy, however patient says she needs time to think about it  - likely superinfected given leukocytosis and tachycardia and odor. Started on vanc zosyn. ID and wound care consulted   - f/u CT Chest/A/P/Head for staging

## 2019-02-02 NOTE — PROGRESS NOTE ADULT - PROBLEM SELECTOR PLAN 6
- stable in ~200s   - likely in the setting of metastatic breast ca to bone  - ggt mildly high   - continue to trend daily

## 2019-02-03 ENCOUNTER — TRANSCRIPTION ENCOUNTER (OUTPATIENT)
Age: 69
End: 2019-02-03

## 2019-02-03 DIAGNOSIS — N39.0 URINARY TRACT INFECTION, SITE NOT SPECIFIED: ICD-10-CM

## 2019-02-03 DIAGNOSIS — Z71.89 OTHER SPECIFIED COUNSELING: ICD-10-CM

## 2019-02-03 LAB
-  AMIKACIN: SIGNIFICANT CHANGE UP
-  AMPICILLIN/SULBACTAM: SIGNIFICANT CHANGE UP
-  AMPICILLIN: SIGNIFICANT CHANGE UP
-  AZTREONAM: SIGNIFICANT CHANGE UP
-  CEFAZOLIN: SIGNIFICANT CHANGE UP
-  CEFEPIME: SIGNIFICANT CHANGE UP
-  CEFOXITIN: SIGNIFICANT CHANGE UP
-  CEFTAZIDIME: SIGNIFICANT CHANGE UP
-  CEFTRIAXONE: SIGNIFICANT CHANGE UP
-  CIPROFLOXACIN: SIGNIFICANT CHANGE UP
-  ERTAPENEM: SIGNIFICANT CHANGE UP
-  GENTAMICIN: SIGNIFICANT CHANGE UP
-  IMIPENEM: SIGNIFICANT CHANGE UP
-  LEVOFLOXACIN: SIGNIFICANT CHANGE UP
-  MEROPENEM: SIGNIFICANT CHANGE UP
-  NITROFURANTOIN: SIGNIFICANT CHANGE UP
-  PIPERACILLIN/TAZOBACTAM: SIGNIFICANT CHANGE UP
-  TIGECYCLINE: SIGNIFICANT CHANGE UP
-  TOBRAMYCIN: SIGNIFICANT CHANGE UP
-  TRIMETHOPRIM/SULFAMETHOXAZOLE: SIGNIFICANT CHANGE UP
ALBUMIN SERPL ELPH-MCNC: 1.9 G/DL — LOW (ref 3.3–5)
ALP SERPL-CCNC: 224 U/L — HIGH (ref 40–120)
ALT FLD-CCNC: 7 U/L — SIGNIFICANT CHANGE UP (ref 4–33)
ANION GAP SERPL CALC-SCNC: 15 MMO/L — HIGH (ref 7–14)
AST SERPL-CCNC: 50 U/L — HIGH (ref 4–32)
BACTERIA UR CULT: SIGNIFICANT CHANGE UP
BILIRUB SERPL-MCNC: 0.6 MG/DL — SIGNIFICANT CHANGE UP (ref 0.2–1.2)
BUN SERPL-MCNC: 7 MG/DL — SIGNIFICANT CHANGE UP (ref 7–23)
CALCIUM SERPL-MCNC: 8.3 MG/DL — LOW (ref 8.4–10.5)
CHLORIDE SERPL-SCNC: 103 MMOL/L — SIGNIFICANT CHANGE UP (ref 98–107)
CO2 SERPL-SCNC: 23 MMOL/L — SIGNIFICANT CHANGE UP (ref 22–31)
CREAT SERPL-MCNC: 0.41 MG/DL — LOW (ref 0.5–1.3)
GLUCOSE SERPL-MCNC: 73 MG/DL — SIGNIFICANT CHANGE UP (ref 70–99)
HCT VFR BLD CALC: 23.9 % — LOW (ref 34.5–45)
HGB BLD-MCNC: 7.4 G/DL — LOW (ref 11.5–15.5)
LACTATE SERPL-SCNC: 4.1 MMOL/L — CRITICAL HIGH (ref 0.5–2)
MAGNESIUM SERPL-MCNC: 1.9 MG/DL — SIGNIFICANT CHANGE UP (ref 1.6–2.6)
MCHC RBC-ENTMCNC: 28.2 PG — SIGNIFICANT CHANGE UP (ref 27–34)
MCHC RBC-ENTMCNC: 31 % — LOW (ref 32–36)
MCV RBC AUTO: 91.2 FL — SIGNIFICANT CHANGE UP (ref 80–100)
METHOD TYPE: SIGNIFICANT CHANGE UP
NRBC # FLD: 0.05 K/UL — LOW (ref 25–125)
ORGANISM # SPEC MICROSCOPIC CNT: SIGNIFICANT CHANGE UP
ORGANISM # SPEC MICROSCOPIC CNT: SIGNIFICANT CHANGE UP
PHOSPHATE SERPL-MCNC: 2.5 MG/DL — SIGNIFICANT CHANGE UP (ref 2.5–4.5)
PLATELET # BLD AUTO: 437 K/UL — HIGH (ref 150–400)
PMV BLD: 9.3 FL — SIGNIFICANT CHANGE UP (ref 7–13)
POTASSIUM SERPL-MCNC: 3.2 MMOL/L — LOW (ref 3.5–5.3)
POTASSIUM SERPL-SCNC: 3.2 MMOL/L — LOW (ref 3.5–5.3)
PROT SERPL-MCNC: 5.7 G/DL — LOW (ref 6–8.3)
RBC # BLD: 2.62 M/UL — LOW (ref 3.8–5.2)
RBC # FLD: 19.7 % — HIGH (ref 10.3–14.5)
SODIUM SERPL-SCNC: 141 MMOL/L — SIGNIFICANT CHANGE UP (ref 135–145)
WBC # BLD: 16.79 K/UL — HIGH (ref 3.8–10.5)
WBC # FLD AUTO: 16.79 K/UL — HIGH (ref 3.8–10.5)

## 2019-02-03 PROCEDURE — 99233 SBSQ HOSP IP/OBS HIGH 50: CPT

## 2019-02-03 PROCEDURE — 99233 SBSQ HOSP IP/OBS HIGH 50: CPT | Mod: GC

## 2019-02-03 RX ORDER — SODIUM CHLORIDE 9 MG/ML
1000 INJECTION INTRAMUSCULAR; INTRAVENOUS; SUBCUTANEOUS
Qty: 0 | Refills: 0 | Status: DISCONTINUED | OUTPATIENT
Start: 2019-02-03 | End: 2019-02-03

## 2019-02-03 RX ORDER — SODIUM CHLORIDE 9 MG/ML
1000 INJECTION INTRAMUSCULAR; INTRAVENOUS; SUBCUTANEOUS
Qty: 0 | Refills: 0 | Status: DISCONTINUED | OUTPATIENT
Start: 2019-02-03 | End: 2019-02-10

## 2019-02-03 RX ORDER — METRONIDAZOLE 500 MG
1000 TABLET ORAL DAILY
Qty: 0 | Refills: 0 | Status: DISCONTINUED | OUTPATIENT
Start: 2019-02-03 | End: 2019-02-11

## 2019-02-03 RX ORDER — METRONIDAZOLE 500 MG
500 TABLET ORAL DAILY
Qty: 0 | Refills: 0 | Status: DISCONTINUED | OUTPATIENT
Start: 2019-02-03 | End: 2019-02-03

## 2019-02-03 RX ORDER — POTASSIUM CHLORIDE 20 MEQ
40 PACKET (EA) ORAL EVERY 4 HOURS
Qty: 0 | Refills: 0 | Status: COMPLETED | OUTPATIENT
Start: 2019-02-03 | End: 2019-02-03

## 2019-02-03 RX ADMIN — Medication 1 TABLET(S): at 18:19

## 2019-02-03 RX ADMIN — Medication 1000 MILLIGRAM(S): at 16:49

## 2019-02-03 RX ADMIN — ENOXAPARIN SODIUM 40 MILLIGRAM(S): 100 INJECTION SUBCUTANEOUS at 11:33

## 2019-02-03 RX ADMIN — SODIUM CHLORIDE 100 MILLILITER(S): 9 INJECTION INTRAMUSCULAR; INTRAVENOUS; SUBCUTANEOUS at 08:33

## 2019-02-03 RX ADMIN — Medication 40 MILLIEQUIVALENT(S): at 14:38

## 2019-02-03 RX ADMIN — Medication 1 TABLET(S): at 05:48

## 2019-02-03 RX ADMIN — Medication 1 APPLICATION(S): at 11:33

## 2019-02-03 RX ADMIN — CEFTRIAXONE 100 GRAM(S): 500 INJECTION, POWDER, FOR SOLUTION INTRAMUSCULAR; INTRAVENOUS at 10:07

## 2019-02-03 RX ADMIN — AMLODIPINE BESYLATE 5 MILLIGRAM(S): 2.5 TABLET ORAL at 05:48

## 2019-02-03 RX ADMIN — Medication 40 MILLIEQUIVALENT(S): at 18:18

## 2019-02-03 NOTE — DISCHARGE NOTE ADULT - CARE PLAN
Principal Discharge DX:	Breast mass, right  Goal:	Management of symptoms  Assessment and plan of treatment:	You came into the hospital with months long history of a fungating right breast mass. This mass is almost certainly a type of breast cancer. We had our surgeons evaluate you and they recommended a palliative removal of the breast mass but you refused it. We also had our oncologists evaluate you and they recommended doing a breast biopsy but you refused that as well.  Secondary Diagnosis:	SOB (shortness of breath)  Goal:	Diagnosis and treatment  Assessment and plan of treatment:	You came into the hospital with shortness of breath. Your shortness of breath is likely caused by your severe anemia but also from significant metastatic disease. We checked you for a pulmonary embolism, which is a clot in the lungs. Our tests did not show any evidence of a pulmonary embolism. Principal Discharge DX:	Breast mass, right  Goal:	Management of symptoms  Assessment and plan of treatment:	You came into the hospital with months long history of a fungating right breast mass. This mass is almost certainly a type of breast cancer, which appears to be very aggressive. We had our surgeons evaluate you and they recommended a palliative removal of the breast mass along with a biopsy. After taking time to view your options, you elected to proceed with palliative mastectomy.  Secondary Diagnosis:	SOB (shortness of breath)  Goal:	Diagnosis and treatment  Assessment and plan of treatment:	You came into the hospital with shortness of breath. Your shortness of breath is likely caused by your severe anemia but also from significant metastatic disease. We checked you for a pulmonary embolism, which is a clot in the lungs. Our tests did not show any evidence of a pulmonary embolism. We did find large metastatic lesions, and we believe the discomfort from these lesions are responsible for your shortness of breath. Adequate pain/discomfort control with oxycodone should help ease your breathing. Principal Discharge DX:	Breast mass, right  Goal:	Management of symptoms  Assessment and plan of treatment:	You came into the hospital with months long history of a fungating right breast mass. This mass is almost certainly a type of breast cancer, which appears to be very aggressive. We had our surgeons evaluate you and they recommended a palliative removal of the breast mass along with a biopsy. After taking time to view your options, you elected to proceed with palliative mastectomy.  Secondary Diagnosis:	SOB (shortness of breath)  Goal:	Diagnosis and treatment  Assessment and plan of treatment:	You came into the hospital with shortness of breath. Your shortness of breath is likely caused by your severe anemia but also from significant metastatic disease. We checked you for a pulmonary embolism, which is a clot in the lungs. Our tests did not show any evidence of a pulmonary embolism. We did find large metastatic lesions, and we believe the discomfort from these lesions are responsible for your shortness of breath. Adequate pain/discomfort control with oxycodone should help ease your breathing.  Secondary Diagnosis:	Brain metastases  Assessment and plan of treatment:	While you were in the hospital, we did an MRI of your brain to workup possible lesions we saw on your CT head. The MRI shows ~15 small lesions within your brain. We believe this represents metastatic disease from breast cancer. To prevent inflammation and seizure in your brain, please continue taking Keppra & Decadron as directed above. Once the biopsy from your mastectomy has resulted, you need to follow up with radiation oncology. They may be able to offer you treatment for your brain lesions.  Secondary Diagnosis:	Severe protein-calorie malnutrition  Assessment and plan of treatment:	Please continue to use nutritional supplements and to try and eat as much of your meals as you can.  Secondary Diagnosis:	Symptomatic anemia  Assessment and plan of treatment:	You received a transfusion while you were in the hospital for severe anemia that resulted in dizziness. Since then, your blood count has still been low but stable. Principal Discharge DX:	Breast mass, right  Goal:	Management of symptoms  Assessment and plan of treatment:	You came into the hospital with a right breast mass that had been present for many months. This mass was almost certainly a type of breast cancer, which appears to be very aggressive. We had our surgeons evaluate you and they recommended a palliative removal of the breast mass along with a biopsy. After taking time to view your options, you elected to proceed with palliative mastectomy. The biopsy was positive for a specific type of breast cancer called invasive ductal carcinoma. Unfortunately on further workup, we found that your cancer as spread to your brain, lungs, adrenal glands and bone. These metastases can make it painful for you to breathe. Please take oxycodone as needed above to help with your pain and to help you breathe more comfortably.  Secondary Diagnosis:	SOB (shortness of breath)  Goal:	Diagnosis and treatment  Assessment and plan of treatment:	You came into the hospital with shortness of breath. Your shortness of breath then was likely caused by severe anemia but also from significant metastatic disease. We checked you for a pulmonary embolism, which is a clot in the lungs. Our tests did not show any evidence of a pulmonary embolism. We did find large metastatic lesions, and we believe the discomfort from these lesions are responsible for your shortness of breath. Adequate pain/discomfort control with oxycodone should help ease your breathing.  Secondary Diagnosis:	Brain metastases  Assessment and plan of treatment:	While you were in the hospital, we did an MRI of your brain to workup possible lesions we saw on your CT head. The MRI shows ~15 small lesions within your brain. We believe this represents metastatic disease from breast cancer. To prevent inflammation and seizure in your brain, please continue taking Keppra. If you decide to seek treatment for these metastases, you will need to be evaluated by a radiation oncologist (contact listed below).  Secondary Diagnosis:	Severe protein-calorie malnutrition  Assessment and plan of treatment:	Please continue to use nutritional supplements and to try and eat as much of your meals as you can.  Secondary Diagnosis:	Symptomatic anemia  Assessment and plan of treatment:	You received several transfusions while you were in the hospital for severe anemia. Workup for your anemia demonstrates it is most likely due to chronic inflammation from the cancer. Chronic inflammation impairs the body's ability to produce lots of red blood cells. You will need to follow up your CBC with your doctor outpatient. Principal Discharge DX:	Breast mass, right  Goal:	Management of symptoms  Assessment and plan of treatment:	You came into the hospital with a right breast mass that had been present for many months. This mass was almost certainly a type of breast cancer, which appears to be very aggressive. We had our surgeons evaluate you and they recommended a palliative removal of the breast mass along with a biopsy. After taking time to view your options, you elected to proceed with palliative mastectomy. The biopsy was positive for a specific type of breast cancer called invasive ductal carcinoma. Unfortunately on further workup, we found that your cancer as spread to your brain, lungs, adrenal glands and bone. These metastases can make it painful for you to breathe. Please take oxycodone as needed above to help with your pain and to help you breathe more comfortably. Please be sure to follow up with Dr. Goldberg for continued management of your wound vac.  Secondary Diagnosis:	SOB (shortness of breath)  Goal:	Diagnosis and treatment  Assessment and plan of treatment:	You came into the hospital with shortness of breath. Your shortness of breath then was likely caused by severe anemia but also from significant metastatic disease. We checked you for a pulmonary embolism, which is a clot in the lungs. Our tests did not show any evidence of a pulmonary embolism. We did find large metastatic lesions, and we believe the discomfort from these lesions are responsible for your shortness of breath. Adequate pain/discomfort control with oxycodone should help ease your breathing.  Secondary Diagnosis:	Brain metastases  Assessment and plan of treatment:	While you were in the hospital, we did an MRI of your brain to workup possible lesions we saw on your CT head. The MRI shows ~15 small lesions within your brain. We believe this represents metastatic disease from breast cancer. To prevent inflammation and seizure in your brain, please continue taking Keppra. If you decide to seek treatment for these metastases, you will need to be evaluated by a radiation oncologist (contact listed below).  Secondary Diagnosis:	Severe protein-calorie malnutrition  Assessment and plan of treatment:	Please continue to use nutritional supplements and to try and eat as much of your meals as you can. Please have your glucose checked via fingerstick twice a day to make sure your glucose is normal since it has been running borderline low in the hospital.  Secondary Diagnosis:	Symptomatic anemia  Assessment and plan of treatment:	You received several transfusions while you were in the hospital for severe anemia. Workup for your anemia demonstrates it is most likely due to chronic inflammation from the cancer. Chronic inflammation impairs the body's ability to produce lots of red blood cells. You will need to follow up your CBC with your doctor outpatient.

## 2019-02-03 NOTE — PROGRESS NOTE ADULT - SUBJECTIVE AND OBJECTIVE BOX
Internal Medicine Progress Note    =========================================  CONTACT INFO  Lona Liz M.D., PGY-2  Pager: [phone] ([facility]) / [phone] ([facility])    Mon-Fri: pager covered by day team 7am-7pm;   ***Academic conferences M-F 8am-9am & 12pm-1pm- page ONLY if URGENT or if Consultant  /Shauna: see chart, primary physician assigned available 7am-12pm  Sat/Up Cross Coverage 12pm-7pm: [facility]- page [phone] for Team1-4, [facility]- pager forwarded to covering Resident  For Night coverage 7pm-7am: [facility]- page [phone] Team1-3, page [phone] Team4 & Care Model; [facility]- page [phone] for Red, [phone] for Blue  =========================================    Patient is a 68y old  Female who presents with a chief complaint of Symptomatic anemia, Rt Fungating Breast mass , (03 Feb 2019 11:20)      SUBJECTIVE / OVERNIGHT EVENTS: received 1u pRBC, Hg improved to 7.4 from 6.4. Pt still wants to think about whether she would like palliative surgery. CTH positive for L occipital lobe lesion with surrounding edema. No PE on CTA chest, does show compression of RUL pulmonary artery by mass effect. Denies CP, abd pain. Endorsing inc WOB, has good sat on RA. Pansensitive E coli UTI. Pt now agreeing to speak to palliative care, order placed.    MEDICATIONS  (STANDING):  amLODIPine   Tablet 5 milliGRAM(s) Oral daily  cefTRIAXone   IVPB 1 Gram(s) IV Intermittent every 24 hours  cefTRIAXone   IVPB      Dakins Solution - 1/4 Strength 1 Application(s) Topical daily  enoxaparin Injectable 40 milliGRAM(s) SubCutaneous daily  influenza   Vaccine 0.5 milliLiter(s) IntraMuscular once  lactobacillus acidophilus 1 Tablet(s) Oral every 12 hours  sodium chloride 0.9%. 1000 milliLiter(s) (100 mL/Hr) IV Continuous <Continuous>    MEDICATIONS  (PRN):  acetaminophen   Tablet .. 650 milliGRAM(s) Oral every 6 hours PRN Mild Pain (1 - 3), Moderate Pain (4 - 6)    Vital Signs Last 24 Hrs  T(C): 36.8 (03 Feb 2019 05:23), Max: 37.3 (02 Feb 2019 13:45)  T(F): 98.3 (03 Feb 2019 05:23), Max: 99.2 (02 Feb 2019 13:45)  HR: 104 (03 Feb 2019 05:23) (104 - 114)  BP: 149/96 (03 Feb 2019 05:23) (148/96 - 154/98)  BP(mean): --  RR: 16 (03 Feb 2019 05:23) (16 - 18)  SpO2: 98% (03 Feb 2019 05:23) (97% - 98%)    CAPILLARY BLOOD GLUCOSE      I&O's Summary    02 Feb 2019 07:01  -  03 Feb 2019 07:00  --------------------------------------------------------  IN: 950 mL / OUT: 0 mL / NET: 950 mL        PHYSICAL EXAM  GENERAL: NAD  HEAD:  Atraumatic  EYES: EOMI, PERRLA, conjunctiva and sclera clear  NECK: Supple, No JVD  CHEST/LUNG: Clear to auscultation bilaterally, poor inspiratory effort; No wheeze, on RA  HEART: Regular rate and rhythm; No murmurs, rubs, or gallops  ABDOMEN: Soft, Nontender, Nondistended; Bowel sounds present  EXTREMITIES:  2+ Peripheral Pulses, No clubbing, cyanosis, or edema  NEURO/PSYCH: AAOx3, nonfocal  SKIN: No rashes. Ulcerated fungating breast mass      LABS:                        7.4    16.79 )-----------( 437      ( 03 Feb 2019 05:00 )             23.9     Hemoglobin: 7.4 g/dL (02-03 @ 05:00)  Hemoglobin: 6.7 g/dL (02-02 @ 06:40)  Hemoglobin: 7.6 g/dL (02-01 @ 06:56)  Hemoglobin: 6.1 g/dL (02-01 @ 01:10)  Hemoglobin: 6.8 g/dL (01-31 @ 19:56)    CBC Full  -  ( 03 Feb 2019 05:00 )  WBC Count : 16.79 K/uL  Hemoglobin : 7.4 g/dL  Hematocrit : 23.9 %  Platelet Count - Automated : 437 K/uL  Mean Cell Volume : 91.2 fL  Mean Cell Hemoglobin : 28.2 pg  Mean Cell Hemoglobin Concentration : 31.0 %  Auto Neutrophil # : x  Auto Lymphocyte # : x  Auto Monocyte # : x  Auto Eosinophil # : x  Auto Basophil # : x  Auto Neutrophil % : x  Auto Lymphocyte % : x  Auto Monocyte % : x  Auto Eosinophil % : x  Auto Basophil % : x    02-03    141  |  103  |  7   ----------------------------<  73  3.2<L>   |  23  |  0.41<L>    Ca    8.3<L>      03 Feb 2019 05:00  Phos  2.5     02-03  Mg     1.9     02-03    TPro  5.7<L>  /  Alb  1.9<L>  /  TBili  0.6  /  DBili  x   /  AST  50<H>  /  ALT  7   /  AlkPhos  224<H>  02-03    Creatinine Trend: 0.41<--, 0.48<--, 0.47<--, 0.46<--, 0.48<--  LIVER FUNCTIONS - ( 03 Feb 2019 05:00 )  Alb: 1.9 g/dL / Pro: 5.7 g/dL / ALK PHOS: 224 u/L / ALT: 7 u/L / AST: 50 u/L / GGT: x             EKG:   MICROBIOLOGY:    Culture - Urine (collected 01 Feb 2019 11:07)  Source: URINE MIDSTREAM  Final Report (03 Feb 2019 12:14):    COLONY COUNT: > = 100,000 CFU/ML  Organism: Escherichia coli (03 Feb 2019 12:14)  Organism: Escherichia coli (03 Feb 2019 12:14)    Culture - Blood (collected 01 Feb 2019 01:37)  Source: BLOOD VENOUS  Preliminary Report (03 Feb 2019 01:35):    NO ORGANISMS ISOLATED    NO ORGANISMS ISOLATED AT 48 HRS.    Culture - Blood (collected 01 Feb 2019 01:37)  Source: BLOOD PERIPHERAL  Preliminary Report (03 Feb 2019 01:35):    NO ORGANISMS ISOLATED    NO ORGANISMS ISOLATED AT 48 HRS.      IMAGING:     CONSULTS:

## 2019-02-03 NOTE — DISCHARGE NOTE ADULT - PATIENT PORTAL LINK FT
You can access the [facility] Patient Portal, offered by [facility], by registering with the following website: [email or web address]

## 2019-02-03 NOTE — PROGRESS NOTE ADULT - PROBLEM SELECTOR PLAN 8
- ECHO, ECG, + Anemia,  -s/p 1u pRBC  -Per CTA chest, neg for PE though there is mass effect on RUL pulmonary artery. Currently breathing on RA.  -monitor respiratory status

## 2019-02-03 NOTE — CONSULT NOTE ADULT - SUBJECTIVE AND OBJECTIVE BOX
HPI:  67 y/o Female no known pmhx presents with malodorous huge R fungating breast mass with SOB and weight loss (25 lbs in 2 mths) x several weeks. Pt does not go to the doctor and denies taking any meds but endorses that "God will take care of her". Pt complains of falling on the stairs a week ago and  has  difficulty ambulating to bathroom due to weakness so has urinated on herself at times,  +dysuria and back pain that is non-radiating, Denies CP, N/V, abdominal pain, fever, constipation, dysphagia, BRBPR, hemoptysis. Pt c/o HA, Dizziness, weakness, dry Cough, + SOB, No Fever, + Diarrhea intermittent, decreased PO intake, + Urinary urgency, Pt's Sister  from breast cancer. Pt is accompanied with her daughter tonight, A+O x 3, EX Smoker, Pt refuses Blood transfusion for now, Hgb 6.8, Occult stool Neg., I requested Blood Cultures x 2 , Iron studies, Ferritin, Vit B12, Folate, LDH, Retic count, Haptoglobin, UA tonight, I called for surgery consult as well tonight, + elevated BLOOD Pressure tonight, Ct Chest/Abdomen/Pelvis/Head are ordered tonight, will start pt on IV zosyn and IV Vanco tonight due to Fungating , Necrotic Rt Breast Mass, + Leukocytosis, WBC 16.04, Lactate 4.0,     Labs: Lactate 4.0, Na 141, K+ 4.3, BUN 11, Creatinine 0.48, Glucose 75, alk Phos 220, AST 40, WBC 16.04, Hgb 6.8, Platelet 662, PT 14.5, INR 1.26, PTT 25.0, Occult stool Neg.,     Vitals: Tem 98.7, , /100, RR 20, 96% RA, (2019 22:23)    Neurosurgery consulted for CT scan findings concerning for ? R parietal lesion . Patient denies headache. Patient refusing mastectomy or treatment of newly diagnosed breast CA with mets to body. Patient requesting hospice.     PAST MEDICAL & SURGICAL HISTORY:  No pertinent past medical history  No significant past surgical history    Allergies    No Known Allergies    Intolerances        acetaminophen   Tablet .. 650 milliGRAM(s) Oral every 6 hours PRN  amLODIPine   Tablet 5 milliGRAM(s) Oral daily  cefTRIAXone   IVPB 1 Gram(s) IV Intermittent every 24 hours  cefTRIAXone   IVPB      Dakins Solution - 1/4 Strength 1 Application(s) Topical daily  enoxaparin Injectable 40 milliGRAM(s) SubCutaneous daily  influenza   Vaccine 0.5 milliLiter(s) IntraMuscular once  lactobacillus acidophilus 1 Tablet(s) Oral every 12 hours  metroNIDAZOLE    Tablet 1000 milliGRAM(s) Oral daily  potassium chloride    Tablet ER 40 milliEquivalent(s) Oral every 4 hours  sodium chloride 0.9%. 1000 milliLiter(s) IV Continuous <Continuous>    Vital Signs Last 24 Hrs  T(C): 36.5 (2019 13:38), Max: 37.1 (2019 22:11)  T(F): 97.7 (2019 13:38), Max: 98.7 (2019 22:11)  HR: 110 (2019 13:38) (104 - 113)  BP: 138/90 (2019 13:38) (138/90 - 152/98)  BP(mean): --  RR: 18 (2019 13:38) (16 - 18)  SpO2: 98% (2019 13:38) (97% - 98%)    Exam:  Awake Alert Oriented x 3  EOMI, No nystagmus, No gaze palsy, PERRL, Visual fields grossly intact  No facial droop, Tongue midline  Sensory Intact to Light touch  RUE: 4/5                       LUE: 4/5  RLE:   4/5                      LLE:  4/5  Toes downgoing, No clonus  DTRs 2+  No pronatory drift, No dysmetria    LABS:                        7.4    16.79 )-----------( 437      ( 2019 05:00 )             23.9     02-03    141  |  103  |  7   ----------------------------<  73  3.2<L>   |  23  |  0.41<L>    Ca    8.3<L>      2019 05:00  Phos  2.5     02-03  Mg     1.9     02-03    TPro  5.7<L>  /  Alb  1.9<L>  /  TBili  0.6  /  DBili  x   /  AST  50<H>  /  ALT  7   /  AlkPhos  224<H>  02-          RADIOLOGY & ADDITIONAL STUDIES:  < from: CT Head No Cont (19 @ 21:18) >  There is a 2.3 x 1.5 x 0.8 cm soft tissue lesion overlying the left   parietal bone, with subadjacent thinning of the calvarium and   hyperintensity/mild thickening of the dura.    Area of hyperintensity involving the left occipital lobe with adjacent   edema. Scattered hypodensities in the cerebellar white matter likely   representing small chronic infarcts.    There is no CT evidence of acute intracranial hemorrhage or midline   shift. The basal cisterns are patent. No evidence of central herniation.     There is mild cerebral and cerebellar volume loss. There is mild   periventricular patchy white matter hypoattenuation which is nonspecific   in etiology but likely related to chronic microvascular ischemic disease.    The visualized paranasal sinuses are clear. The mastoid air cells and   middle ear cavities are clear.    < end of copied text >

## 2019-02-03 NOTE — DISCHARGE NOTE ADULT - CARE PROVIDER_API CALL
Darron Del Rosario)  Surgery  [address], Division [place] [address]  Phone: [phone]  Fax: [phone]  Follow Up Time:     Sven Cee)  Therapeutic Radiology  [address], [facility] [place]  [place] [address]  Phone: [phone]  Fax: [phone]  Follow Up Time:     Ashley Poon (DO)  Geriatric Medicine; [facility]; Internal Medicine  [address] [place] [address]  Phone: [phone]  Fax: [phone]  Follow Up Time: Darron Del Rosario)  Surgery  [address] [place] [address], Division [place] [address]  Phone: [phone]  Fax: [phone]  Follow Up Time:     Sven Cee)  Therapeutic Radiology  [address] [place] [address], [facility] [place] [address]  Phone: [phone]  Fax: [phone]  Follow Up Time:     Ashley Poon (DO)  [facility]; Internal Medicine  [address] [place] [address]  Phone: [phone]  Fax: [phone]  Follow Up Time:     Alan Goldberg)  Surgery  [facility]  [address] [place] [address], [address]  [place] [address]  Phone: [phone]  Fax: [phone]  Follow Up Time:

## 2019-02-03 NOTE — PROGRESS NOTE ADULT - PROBLEM SELECTOR PLAN 1
Patient wants daughter (Sandy) and co- (Adriano Galan) to be her HCP  -Full code. Has capacity. Strong mariola that God will heal her  -now agreeing to speak with palliative care doctors, will evaluate for home hospice  - patient is  in [employer]  - currently agrees to blood transfusions. Still undecided about palliative surgery offered by surg onc

## 2019-02-03 NOTE — PROGRESS NOTE ADULT - PROBLEM SELECTOR PLAN 4
- WBC 16, neutrophil predominance, , afebrile   - lactate 4, Likely secondary to necrotic metastatic breast ca   - blood cx negative   - ID thinks likely secondary to cancer and not infection, monitor off abx

## 2019-02-03 NOTE — DISCHARGE NOTE ADULT - MEDICATION SUMMARY - MEDICATIONS TO TAKE
I will START or STAY ON the medications listed below when I get home from the hospital:    Fingerstick glucose  -- Fingerstick glucose checks twice a day  -- Indication: For Hypoglycemia prevention    acetaminophen 325 mg oral tablet  -- 2 tab(s) by mouth every 6 hours, As needed, Mild Pain (1 - 3), Moderate Pain (4 - 6)  -- Indication: For Pain Control    oxyCODONE  -- 2.5 milligram(s) by mouth every 6 hours, As Needed  -- Indication: For Pain Control    levETIRAcetam 100 mg/mL oral solution  -- 5 milliliter(s) by mouth 2 times a day  -- Indication: For Seizure    amLODIPine 5 mg oral tablet  -- 1 tab(s) by mouth once a day  -- Indication: For Hypertension    polyethylene glycol 3350 oral powder for reconstitution  -- 17 gram(s) by mouth once a day, As needed, Constipation  -- Indication: For Constipation    senna oral tablet  -- 2 tab(s) by mouth once a day (at bedtime)  -- Indication: For Constipation    potassium phosphate-sodium phosphate 250 mg-280 mg-160 mg oral powder for reconstitution  -- 1 packet(s) by mouth once a day  -- Indication: For Nutritional Supplement    lactobacillus acidophilus oral capsule  -- 1 tab(s) by mouth 2 times a day  -- Indication: For Nutritional Supplement

## 2019-02-03 NOTE — PROGRESS NOTE ADULT - PROBLEM SELECTOR PLAN 9
DVT PPx: lovenox 40mg QD   Diet: DASH diet  Dispo: pending workup of breast mass and palliative consult

## 2019-02-03 NOTE — DISCHARGE NOTE ADULT - ADDITIONAL INSTRUCTIONS
Please follow up with Dr. Goldberg within 5 days of discharge from the hospital. You may call his office to schedule an appointment.  Wound vac change today 2/16/19 keep on for 5 more days and the follow up with Dr. Goldberg (vac cannot come off until follow up with Dr. Goldberg)  ([id number])  Keep tegaderm on RLE wound, do not remove until follow up with Dr. Goldberg

## 2019-02-03 NOTE — DISCHARGE NOTE ADULT - OTHER SIGNIFICANT FINDINGS
< from: CT Angio Chest w/ IV Cont (02.02.19 @ 21:01) >    INTERPRETATION:  CLINICAL INFORMATION: Tachycardia, history of breast   cancer. Evaluate for pulmonary embolism or acute abdominal pathology    COMPARISON: None.    PROCEDURE:   CT Angiography of the Chest was performed followed by portal venous phase   imaging of the Abdomen and Pelvis.  90 ml of Omnipaque 350 was injected intravenously. 10 ml were discarded.  Sagittal and coronal reformats were performed as well as 3D (MIP)   reconstructions.    FINDINGS:    CHEST:     LUNGS AND LARGE AIRWAYS: Patent central airways. Multiple bilateral   intraparenchymal masses in the random distribution, representative   measurements include: Peripheral right lower lobe mass measuring 4.9 x   6.1 cm, peripheral left lower lobe mass measuring 5.4 x 3.3 cm.  Centrilobular emphysema.    PLEURA: Trace bilateral pleural effusions. No pneumothorax.    VESSELS: Suboptimal opacification of the pulmonary arteries. No filling   defect in the main, right or left unremarkable cerebellar branches.   Evaluation of some lobar and segmental branches is limited secondary to   poor opacification and mixing artifact.    HEART: Heart size is normal. No pericardial effusion.    MEDIASTINUM AND CHERELLE: Significant mediastinal and right hilar adenopathy   with multiple confluent nodes, example measurement includes a left upper   paratracheal node measuring up to 3.6 x 3.0 cm (4, 173). The right hilar   and mediastinal masses external mass effect without significant   compression of the right main pulmonary artery. Right supraclavicular   node measuring 2.3 cm (2, 10).    CHEST WALL AND LOWER NECK: Large heterogeneously enhancing right breast   mass measuring 10.9 x 14.2 x 20.1 cm, with central area of hypodensity   which may represent internal necrosis.  Additional well-circumscribed mass lateral to the left axilla measures   3.0 x 2.6 cm (2, 27).  Hypodense nodule in the left thyroid lobe measuring1.3 cm.    ABDOMEN AND PELVIS:    LIVER: Within normal limits.    BILE DUCTS: Normal caliber.    GALLBLADDER: Within normal limits.    SPLEEN: Within normal limits.    PANCREAS: Within normal limits.      ADRENALS: Right adrenal nodule measuring 2.5 x 1.6 cm (3, 34). Nodular   appearance of the left adrenal gland.    KIDNEYS/URETERS: Indeterminate left kidney 1.7 cm hypodensity lower pole   focus.    BLADDER: Linear calcifications along the right lateral bladder wall. This   may represent layering stones.    REPRODUCTIVE ORGANS: Myomatous uterus. Endometrial canal is distended   measuring up to 1.2 cm with central hypodensity. No adnexal masses.    BOWEL: No bowel obstruction.     PERITONEUM: No ascites.    VESSELS:  Atherosclerotic calcifications.    RETROPERITONEUM: No retroperitoneal lymphadenopathy.      ABDOMINAL WALL: Subcutaneous edema along the left lateral flank.    BONES: Multilevel degenerative changes.    IMPRESSION:  No pulmonary embolism.    Right breast mass measuring up to10.9 cm represents patient's known   breast cancer. Multiple pulmonary masses and hilar/mediastinal   lymphadenopathy represent metastatic disease.    No acute intra-abdominal pathology.    Right adrenal nodule may represent metastatic disease as well.    < end of copied text >

## 2019-02-03 NOTE — DISCHARGE NOTE ADULT - HOSPITAL COURSE
HISTORY OF PRESENT ILLNESS   67 y/o Female no known pmhx presents with malodorous huge R fungating breast mass with SOB and weight loss (25 lbs in 2 mths) x several weeks. Pt does not go to the doctor and denies taking any meds but endorses that "God will take care of her". Pt complains of falling on the stairs a week ago and  has  difficulty ambulating to bathroom due to weakness so has urinated on herself at times,  +dysuria and back pain that is non-radiating, Denies CP, N/V, abdominal pain, fever, constipation, dysphagia, BRBPR, hemoptysis. Pt c/o HA, Dizziness, weakness, dry Cough, + SOB, No Fever, + Diarrhea intermittent, decreased PO intake, + Urinary urgency, Pt's Sister  from breast cancer. Pt is accompanied with her daughter tonight, A+O x 3, EX Smoker, Pt refuses Blood transfusion for now, Hgb 6.8, Occult stool Neg., I requested Blood Cultures x 2 , Iron studies, Ferritin, Vit B12, Folate, LDH, Retic count, Haptoglobin, UA tonight, I called for surgery consult as well tonight, + elevated BLOOD Pressure tonight, Ct Chest/Abdomen/Pelvis/Head are ordered tonight, will start pt on IV zosyn and IV Vanco tonight due to Fungating , Necrotic Rt Breast Mass, + Leukocytosis, WBC 16.04, Lactate 4.0,     Labs: Lactate 4.0, Na 141, K+ 4.3, BUN 11, Creatinine 0.48, Glucose 75, alk Phos 220, AST 40, WBC 16.04, Hgb 6.8, Platelet 662, PT 14.5, INR 1.26, PTT 25.0, Occult stool Neg.,     Vitals: Tem 98.7, , /100, RR 20, 96% RA,     HOSPITAL COURSE  Surgical oncology was called and recommended doing a palliative mastectomy, however patient refused. Oncologists were also called who recommended a biopsy to guide possible chemotherapy but patient refused that as well. A CT scan showed significant amount of metastatic disease. Dx. R-sided breast cancer w/ metastatic disease (lung, brain, skull, R adrenal)    HISTORY OF PRESENT ILLNESS   69 y/o Female no known pmhx presents with malodorous huge R fungating breast mass with SOB and weight loss (25 lbs in 2 mths) x several weeks. Pt does not go to the doctor and denies taking any meds. She is very Spiritism, and has great mariola that God will take care of her. Pt complains of falling on the stairs a week prior to admission and has difficulty ambulating to bathroom due to weakness, +dysuria and back pain that is non-radiating.     HOSPITAL COURSE  Surgical oncology was called and recommended doing a palliative mastectomy, however patient initially refused. CT scan showed significant amount of metastatic disease including multiple large pulmonary nodules, an adrenal nodule and likely brain mets. An MRI head was performed, showing at least 15 metastatic lesions within the brain and a skull lesion as well.    Pt then began to become undecided as to whether or not she wanted to pursue treatment. She eventually decided on 2/7/19 that she wanted a mastectomy. Radiation oncology also evaluated pt. Offered whole brain radiation, however a breast biopsy is necessary prior to proceeding. Dx. R-sided breast cancer w/ metastatic disease (lung, brain, skull, R adrenal)    HISTORY OF PRESENT ILLNESS   69 y/o Female no known pmhx presents with malodorous huge R fungating breast mass with SOB and weight loss (25 lbs in 2 mths) x several weeks. Pt does not go to the doctor and denies taking any meds. She is very Jainism, and has great mariola that God will take care of her. Pt complains of falling on the stairs a week prior to admission and has difficulty ambulating to bathroom due to weakness, +dysuria and back pain that is non-radiating.     HOSPITAL COURSE  Surgical oncology was called and recommended doing a palliative mastectomy, however patient initially refused. CT scan showed significant amount of metastatic disease including multiple large pulmonary nodules, an adrenal nodule and likely brain mets. An MRI head was performed, showing at least 15 metastatic lesions within the brain and a skull lesion as well.    Pt then began to become undecided as to whether or not she wanted to pursue treatment. She eventually decided to pursue palliative mastectomy 2/11/19. 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 Patient did not decide on or ask for any further treatment. Offered whole brain radiation by rad onc, but never said yes/no. Pt also ambivalent about speaking with medical oncology.    Hospital course then complicated by GTC on 2/13 that was aborted with Ativan. Seizure was 2/2 brain mets & failure to comply with prophylactic Keppra. Pt now more compliant w/ Keppra and no recurrence of seizure since. Revisited code status and hospice w/ family. In current state, pt would most likely prefer to be DNI/DNR per daughter Sandy &  Adriano Adama. Would like to hold off hospice services for now and instead prefer to discharge to rehab first now that pt is medically clear.

## 2019-02-03 NOTE — CONSULT NOTE ADULT - ASSESSMENT
68 year old F newly diagnosed with breast ca this admission due to large erosive breast mass with multiple mets on CT c/a/p with CT head (obtained for frequent falls and generalized weakness) demonstrated area of abnormality in Left parietal/occipital area

## 2019-02-03 NOTE — DISCHARGE NOTE ADULT - PLAN OF CARE
Management of symptoms You came into the hospital with months long history of a fungating right breast mass. This mass is almost certainly a type of breast cancer. We had our surgeons evaluate you and they recommended a palliative removal of the breast mass but you refused it. We also had our oncologists evaluate you and they recommended doing a breast biopsy but you refused that as well. Diagnosis and treatment You came into the hospital with shortness of breath. Your shortness of breath is likely caused by your severe anemia but also from significant metastatic disease. We checked you for a pulmonary embolism, which is a clot in the lungs. Our tests did not show any evidence of a pulmonary embolism. You came into the hospital with months long history of a fungating right breast mass. This mass is almost certainly a type of breast cancer, which appears to be very aggressive. We had our surgeons evaluate you and they recommended a palliative removal of the breast mass along with a biopsy. After taking time to view your options, you elected to proceed with palliative mastectomy. You came into the hospital with shortness of breath. Your shortness of breath is likely caused by your severe anemia but also from significant metastatic disease. We checked you for a pulmonary embolism, which is a clot in the lungs. Our tests did not show any evidence of a pulmonary embolism. We did find large metastatic lesions, and we believe the discomfort from these lesions are responsible for your shortness of breath. Adequate pain/discomfort control with oxycodone should help ease your breathing. While you were in the hospital, we did an MRI of your brain to workup possible lesions we saw on your CT head. The MRI shows ~15 small lesions within your brain. We believe this represents metastatic disease from breast cancer. To prevent inflammation and seizure in your brain, please continue taking Keppra & Decadron as directed above. Once the biopsy from your mastectomy has resulted, you need to follow up with radiation oncology. They may be able to offer you treatment for your brain lesions. Please continue to use nutritional supplements and to try and eat as much of your meals as you can. You received a transfusion while you were in the hospital for severe anemia that resulted in dizziness. Since then, your blood count has still been low but stable. You came into the hospital with a right breast mass that had been present for many months. This mass was almost certainly a type of breast cancer, which appears to be very aggressive. We had our surgeons evaluate you and they recommended a palliative removal of the breast mass along with a biopsy. After taking time to view your options, you elected to proceed with palliative mastectomy. The biopsy was positive for a specific type of breast cancer called invasive ductal carcinoma. Unfortunately on further workup, we found that your cancer as spread to your brain, lungs, adrenal glands and bone. These metastases can make it painful for you to breathe. Please take oxycodone as needed above to help with your pain and to help you breathe more comfortably. You came into the hospital with shortness of breath. Your shortness of breath then was likely caused by severe anemia but also from significant metastatic disease. We checked you for a pulmonary embolism, which is a clot in the lungs. Our tests did not show any evidence of a pulmonary embolism. We did find large metastatic lesions, and we believe the discomfort from these lesions are responsible for your shortness of breath. Adequate pain/discomfort control with oxycodone should help ease your breathing. While you were in the hospital, we did an MRI of your brain to workup possible lesions we saw on your CT head. The MRI shows ~15 small lesions within your brain. We believe this represents metastatic disease from breast cancer. To prevent inflammation and seizure in your brain, please continue taking Keppra. If you decide to seek treatment for these metastases, you will need to be evaluated by a radiation oncologist (contact listed below). You received several transfusions while you were in the hospital for severe anemia. Workup for your anemia demonstrates it is most likely due to chronic inflammation from the cancer. Chronic inflammation impairs the body's ability to produce lots of red blood cells. You will need to follow up your CBC with your doctor outpatient. Please continue to use nutritional supplements and to try and eat as much of your meals as you can. Please have your glucose checked via fingerstick twice a day to make sure your glucose is normal since it has been running borderline low in the hospital. You came into the hospital with a right breast mass that had been present for many months. This mass was almost certainly a type of breast cancer, which appears to be very aggressive. We had our surgeons evaluate you and they recommended a palliative removal of the breast mass along with a biopsy. After taking time to view your options, you elected to proceed with palliative mastectomy. The biopsy was positive for a specific type of breast cancer called invasive ductal carcinoma. Unfortunately on further workup, we found that your cancer as spread to your brain, lungs, adrenal glands and bone. These metastases can make it painful for you to breathe. Please take oxycodone as needed above to help with your pain and to help you breathe more comfortably. Please be sure to follow up with Dr. Goldberg for continued management of your wound vac.

## 2019-02-03 NOTE — DISCHARGE NOTE ADULT - CARE PROVIDERS DIRECT ADDRESSES
[email or web address]

## 2019-02-03 NOTE — PROGRESS NOTE ADULT - PROBLEM SELECTOR PLAN 5
Likely anemia of chronic disease given Iron Studies, ferritin, Vit B12, Folate, LDH, Haptoglobin, Retic count  -s/p 1u pRBC, Hg improved to 7.4  -monitor H/H

## 2019-02-03 NOTE — DISCHARGE NOTE ADULT - PROVIDER TOKENS
PROVIDER:[TOKEN:[[id number]]],PROVIDER:[TOKEN:[[id number]]],PROVIDER:[TOKEN:[[id number]]] PROVIDER:[TOKEN:[[id number]]],PROVIDER:[TOKEN:[[id number]]],PROVIDER:[TOKEN:[[id number]]],PROVIDER:[TOKEN:[[id number]]]

## 2019-02-03 NOTE — PROGRESS NOTE ADULT - PROBLEM SELECTOR PLAN 2
Presents with R fungating breast mass, + Malodorous, + Drainage found to have likely mets to brain and lungs  -Wound care recs in place  - Surgery recommended palliative mastectomy, however patient says she needs time to think about it  -Per CTA chest, neg for PE though there is mass effect on RUL pulmonary artery. Currently breathing on RA. Presents with R fungating breast mass, + Malodorous, + Drainage found to have likely mets to brain and lungs  -Wound care recs in place  - Surgery recommended palliative mastectomy, however patient says she needs time to think about it  -Per CTA chest, neg for PE though there is mass effect on RUL pulmonary artery. Currently breathing on RA.  -CTH showing L temporal lobe lesion with surrounding edema, neurosurg consult placed

## 2019-02-04 DIAGNOSIS — R06.00 DYSPNEA, UNSPECIFIED: ICD-10-CM

## 2019-02-04 DIAGNOSIS — Z51.5 ENCOUNTER FOR PALLIATIVE CARE: ICD-10-CM

## 2019-02-04 DIAGNOSIS — E43 UNSPECIFIED SEVERE PROTEIN-CALORIE MALNUTRITION: ICD-10-CM

## 2019-02-04 LAB
ALBUMIN SERPL ELPH-MCNC: 1.9 G/DL — LOW (ref 3.3–5)
ALP SERPL-CCNC: 278 U/L — HIGH (ref 40–120)
ALT FLD-CCNC: 7 U/L — SIGNIFICANT CHANGE UP (ref 4–33)
ANION GAP SERPL CALC-SCNC: 13 MMO/L — SIGNIFICANT CHANGE UP (ref 7–14)
AST SERPL-CCNC: 53 U/L — HIGH (ref 4–32)
BILIRUB SERPL-MCNC: 0.5 MG/DL — SIGNIFICANT CHANGE UP (ref 0.2–1.2)
BUN SERPL-MCNC: 6 MG/DL — LOW (ref 7–23)
CALCIUM SERPL-MCNC: 9 MG/DL — SIGNIFICANT CHANGE UP (ref 8.4–10.5)
CHLORIDE SERPL-SCNC: 105 MMOL/L — SIGNIFICANT CHANGE UP (ref 98–107)
CO2 SERPL-SCNC: 23 MMOL/L — SIGNIFICANT CHANGE UP (ref 22–31)
CREAT SERPL-MCNC: 0.4 MG/DL — LOW (ref 0.5–1.3)
GLUCOSE SERPL-MCNC: 77 MG/DL — SIGNIFICANT CHANGE UP (ref 70–99)
HCT VFR BLD CALC: 25.2 % — LOW (ref 34.5–45)
HGB BLD-MCNC: 7.7 G/DL — LOW (ref 11.5–15.5)
MAGNESIUM SERPL-MCNC: 1.8 MG/DL — SIGNIFICANT CHANGE UP (ref 1.6–2.6)
MCHC RBC-ENTMCNC: 28.5 PG — SIGNIFICANT CHANGE UP (ref 27–34)
MCHC RBC-ENTMCNC: 30.6 % — LOW (ref 32–36)
MCV RBC AUTO: 93.3 FL — SIGNIFICANT CHANGE UP (ref 80–100)
NRBC # FLD: 0.02 K/UL — LOW (ref 25–125)
PHOSPHATE SERPL-MCNC: 2 MG/DL — LOW (ref 2.5–4.5)
PLATELET # BLD AUTO: 406 K/UL — HIGH (ref 150–400)
PMV BLD: 9.1 FL — SIGNIFICANT CHANGE UP (ref 7–13)
POTASSIUM SERPL-MCNC: 3.8 MMOL/L — SIGNIFICANT CHANGE UP (ref 3.5–5.3)
POTASSIUM SERPL-SCNC: 3.8 MMOL/L — SIGNIFICANT CHANGE UP (ref 3.5–5.3)
PROT SERPL-MCNC: 6 G/DL — SIGNIFICANT CHANGE UP (ref 6–8.3)
RBC # BLD: 2.7 M/UL — LOW (ref 3.8–5.2)
RBC # FLD: 19.7 % — HIGH (ref 10.3–14.5)
SODIUM SERPL-SCNC: 141 MMOL/L — SIGNIFICANT CHANGE UP (ref 135–145)
WBC # BLD: 16.52 K/UL — HIGH (ref 3.8–10.5)
WBC # FLD AUTO: 16.52 K/UL — HIGH (ref 3.8–10.5)

## 2019-02-04 PROCEDURE — 99233 SBSQ HOSP IP/OBS HIGH 50: CPT | Mod: GC

## 2019-02-04 PROCEDURE — 73521 X-RAY EXAM HIPS BI 2 VIEWS: CPT | Mod: 26

## 2019-02-04 PROCEDURE — 99223 1ST HOSP IP/OBS HIGH 75: CPT | Mod: GC

## 2019-02-04 RX ORDER — OXYCODONE HYDROCHLORIDE 5 MG/1
2.5 TABLET ORAL EVERY 6 HOURS
Qty: 0 | Refills: 0 | Status: DISCONTINUED | OUTPATIENT
Start: 2019-02-04 | End: 2019-02-11

## 2019-02-04 RX ORDER — OXYCODONE HYDROCHLORIDE 5 MG/1
5 TABLET ORAL ONCE
Qty: 0 | Refills: 0 | Status: DISCONTINUED | OUTPATIENT
Start: 2019-02-04 | End: 2019-02-04

## 2019-02-04 RX ORDER — OXYCODONE HYDROCHLORIDE 5 MG/1
5 TABLET ORAL EVERY 6 HOURS
Qty: 0 | Refills: 0 | Status: DISCONTINUED | OUTPATIENT
Start: 2019-02-04 | End: 2019-02-04

## 2019-02-04 RX ORDER — POTASSIUM CHLORIDE 20 MEQ
40 PACKET (EA) ORAL ONCE
Qty: 0 | Refills: 0 | Status: COMPLETED | OUTPATIENT
Start: 2019-02-04 | End: 2019-02-04

## 2019-02-04 RX ADMIN — Medication 1 APPLICATION(S): at 11:44

## 2019-02-04 RX ADMIN — OXYCODONE HYDROCHLORIDE 5 MILLIGRAM(S): 5 TABLET ORAL at 14:36

## 2019-02-04 RX ADMIN — ENOXAPARIN SODIUM 40 MILLIGRAM(S): 100 INJECTION SUBCUTANEOUS at 11:44

## 2019-02-04 RX ADMIN — Medication 40 MILLIEQUIVALENT(S): at 09:11

## 2019-02-04 RX ADMIN — Medication 1000 MILLIGRAM(S): at 11:43

## 2019-02-04 RX ADMIN — Medication 1 TABLET(S): at 20:17

## 2019-02-04 RX ADMIN — CEFTRIAXONE 100 GRAM(S): 500 INJECTION, POWDER, FOR SOLUTION INTRAMUSCULAR; INTRAVENOUS at 09:20

## 2019-02-04 RX ADMIN — Medication 1 TABLET(S): at 06:15

## 2019-02-04 RX ADMIN — AMLODIPINE BESYLATE 5 MILLIGRAM(S): 2.5 TABLET ORAL at 06:15

## 2019-02-04 RX ADMIN — OXYCODONE HYDROCHLORIDE 5 MILLIGRAM(S): 5 TABLET ORAL at 13:36

## 2019-02-04 NOTE — PROGRESS NOTE ADULT - PROBLEM SELECTOR PLAN 4
- WBC 16, neutrophil predominance, , afebrile   - lactate 4, Likely secondary to necrotic metastatic breast ca   - blood cx negative   - ID thinks likely secondary to cancer and not infection, monitor off abx - WBC 16, neutrophil predominance, , afebrile   - lactate 4, Likely secondary to necrotic metastatic breast ca   - blood cx negative   - ID thinks likely secondary to cancer and not infection

## 2019-02-04 NOTE — PROGRESS NOTE ADULT - SUBJECTIVE AND OBJECTIVE BOX
***************************************************************  Micha Bear MD Pgy-1  Contact/Pager [phone] / [phone]  ***************************************************************    TOMEKA PEREZ  68y  MRN: [id number]      Patient is a 68y old  Female who presents with a chief complaint of Symptomatic anemia, Rt Fungating Breast mass , (03 Feb 2019 15:50)      Subjective/ON Events: No acute events overnight, for MRI head today      MEDICATIONS  (STANDING):  amLODIPine   Tablet 5 milliGRAM(s) Oral daily  cefTRIAXone   IVPB 1 Gram(s) IV Intermittent every 24 hours  cefTRIAXone   IVPB      Dakins Solution - 1/4 Strength 1 Application(s) Topical daily  enoxaparin Injectable 40 milliGRAM(s) SubCutaneous daily  influenza   Vaccine 0.5 milliLiter(s) IntraMuscular once  lactobacillus acidophilus 1 Tablet(s) Oral every 12 hours  metroNIDAZOLE    Tablet 1000 milliGRAM(s) Oral daily  potassium chloride    Tablet ER 40 milliEquivalent(s) Oral once  sodium chloride 0.9%. 1000 milliLiter(s) (100 mL/Hr) IV Continuous <Continuous>    MEDICATIONS  (PRN):  acetaminophen   Tablet .. 650 milliGRAM(s) Oral every 6 hours PRN Mild Pain (1 - 3), Moderate Pain (4 - 6)        Objective:    Vitals: Vital Signs Last 24 Hrs  T(C): 37 (02-04-19 @ 06:12), Max: 37 (02-04-19 @ 06:12)  T(F): 98.6 (02-04-19 @ 06:12), Max: 98.6 (02-04-19 @ 06:12)  HR: 104 (02-04-19 @ 06:12) (104 - 113)  BP: 161/95 (02-04-19 @ 06:12) (138/90 - 161/95)  RR: 17 (02-04-19 @ 06:12) (17 - 18)  SpO2: 96% (02-04-19 @ 06:12) (96% - 98%)                I&O's Summary    03 Feb 2019 07:01  -  04 Feb 2019 07:00  --------------------------------------------------------  IN: 200 mL / OUT: 0 mL / NET: 200 mL        PHYSICAL EXAM:  GENERAL: NAD  HEAD:  Atraumatic  EYES: EOMI, PERRLA, conjunctiva and sclera clear  NECK: Supple, No JVD  CHEST/LUNG: Clear to auscultation bilaterally, poor inspiratory effort; No wheeze, on RA  HEART: Regular rate and rhythm; No murmurs, rubs, or gallops  ABDOMEN: Soft, Nontender, Nondistended; Bowel sounds present  EXTREMITIES:  2+ Peripheral Pulses, No clubbing, cyanosis, or edema  NEURO/PSYCH: AAOx3, nonfocal  SKIN: No rashes. Ulcerated fungating breast mass                                           LABS:  02-03    141  |  103  |  7   ----------------------------<  73  3.2<L>   |  23  |  0.41<L>  02-02    140  |  104  |  9   ----------------------------<  76  3.7   |  22  |  0.48<L>    Ca    8.3<L>      03 Feb 2019 05:00  Ca    8.9      02 Feb 2019 06:40  Phos  2.5     02-03  Mg     1.9     02-03    TPro  5.7<L>  /  Alb  1.9<L>  /  TBili  0.6  /  DBili  x   /  AST  50<H>  /  ALT  7   /  AlkPhos  224<H>  02-03  TPro  6.5  /  Alb  2.1<L>  /  TBili  0.4  /  DBili  x   /  AST  71<H>  /  ALT  6   /  AlkPhos  246<H>  02-02                          7.4    16.79 )-----------( 437      ( 03 Feb 2019 05:00 )             23.9                         6.7    19.51 )-----------( 576      ( 02 Feb 2019 06:40 )             23.5 ***************************************************************  Micha Bear MD Pgy-1  Contact/Pager [phone] / [phone]  ***************************************************************    TOMEKA PEREZ  68y  MRN: [id number]      Patient is a 68y old  Female who presents with a chief complaint of Symptomatic anemia, Rt Fungating Breast mass , (03 Feb 2019 15:50)      Subjective/ON Events: No acute events overnight, for MRI head today to further evaluate L temporal mass. Pt endorses difficulty breathing and R sided CP related to her breast mass. O/w no other complaints.      MEDICATIONS  (STANDING):  amLODIPine   Tablet 5 milliGRAM(s) Oral daily  cefTRIAXone   IVPB 1 Gram(s) IV Intermittent every 24 hours  cefTRIAXone   IVPB      Dakins Solution - 1/4 Strength 1 Application(s) Topical daily  enoxaparin Injectable 40 milliGRAM(s) SubCutaneous daily  influenza   Vaccine 0.5 milliLiter(s) IntraMuscular once  lactobacillus acidophilus 1 Tablet(s) Oral every 12 hours  metroNIDAZOLE    Tablet 1000 milliGRAM(s) Oral daily  potassium chloride    Tablet ER 40 milliEquivalent(s) Oral once  sodium chloride 0.9%. 1000 milliLiter(s) (100 mL/Hr) IV Continuous <Continuous>    MEDICATIONS  (PRN):  acetaminophen   Tablet .. 650 milliGRAM(s) Oral every 6 hours PRN Mild Pain (1 - 3), Moderate Pain (4 - 6)        Objective:    Vitals: Vital Signs Last 24 Hrs  T(C): 37 (02-04-19 @ 06:12), Max: 37 (02-04-19 @ 06:12)  T(F): 98.6 (02-04-19 @ 06:12), Max: 98.6 (02-04-19 @ 06:12)  HR: 104 (02-04-19 @ 06:12) (104 - 113)  BP: 161/95 (02-04-19 @ 06:12) (138/90 - 161/95)  RR: 17 (02-04-19 @ 06:12) (17 - 18)  SpO2: 96% (02-04-19 @ 06:12) (96% - 98%)                I&O's Summary    03 Feb 2019 07:01  -  04 Feb 2019 07:00  --------------------------------------------------------  IN: 200 mL / OUT: 0 mL / NET: 200 mL        PHYSICAL EXAM:  GENERAL: NAD  HEAD:  Atraumatic  EYES: EOMI, PERRLA, conjunctiva and sclera clear  NECK: Supple, No JVD  CHEST/LUNG: Clear to auscultation bilaterally, poor inspiratory effort; No wheeze, on RA  HEART: Regular rate and rhythm; No murmurs, rubs, or gallops  ABDOMEN: Soft, Nontender, Nondistended; Bowel sounds present  EXTREMITIES:  2+ Peripheral Pulses, No clubbing, cyanosis, or edema  NEURO/PSYCH: AAOx3, nonfocal  SKIN: No rashes. Ulcerated fungating breast mass                                           LABS:  02-03    141  |  103  |  7   ----------------------------<  73  3.2<L>   |  23  |  0.41<L>  02-02    140  |  104  |  9   ----------------------------<  76  3.7   |  22  |  0.48<L>    Ca    8.3<L>      03 Feb 2019 05:00  Ca    8.9      02 Feb 2019 06:40  Phos  2.5     02-03  Mg     1.9     02-03    TPro  5.7<L>  /  Alb  1.9<L>  /  TBili  0.6  /  DBili  x   /  AST  50<H>  /  ALT  7   /  AlkPhos  224<H>  02-03  TPro  6.5  /  Alb  2.1<L>  /  TBili  0.4  /  DBili  x   /  AST  71<H>  /  ALT  6   /  AlkPhos  246<H>  02-02                          7.4    16.79 )-----------( 437      ( 03 Feb 2019 05:00 )             23.9                         6.7    19.51 )-----------( 576      ( 02 Feb 2019 06:40 )             23.5

## 2019-02-04 NOTE — CONSULT NOTE ADULT - SUBJECTIVE AND OBJECTIVE BOX
HPI:  69 y/o Female no known pmhx presents with malodorous huge R fungating breast mass with SOB and weight loss (25 lbs in 2 mths) x several weeks. Pt does not go to the doctor and denies taking any meds but endorses that "God will take care of her". Pt complains of falling on the stairs a week ago and  has  difficulty ambulating to bathroom due to weakness so has urinated on herself at times,  +dysuria and back pain that is non-radiating, Denies CP, N/V, abdominal pain, fever, constipation, dysphagia, BRBPR, hemoptysis. Pt c/o HA, Dizziness, weakness, dry Cough, + SOB, No Fever, + Diarrhea intermittent, decreased PO intake, + Urinary urgency, Pt's Sister  from breast cancer. Pt is accompanied with her daughter tonight, A+O x 3, EX Smoker, Pt refuses Blood transfusion for now, Hgb 6.8, Occult stool Neg., I requested Blood Cultures x 2 , Iron studies, Ferritin, Vit B12, Folate, LDH, Retic count, Haptoglobin, UA tonight, I called for surgery consult as well tonight, + elevated BLOOD Pressure tonight, Ct Chest/Abdomen/Pelvis/Head are ordered tonight, will start pt on IV zosyn and IV Vanco tonight due to Fungating , Necrotic Rt Breast Mass, + Leukocytosis, WBC 16.04, Lactate 4.0,     Labs: Lactate 4.0, Na 141, K+ 4.3, BUN 11, Creatinine 0.48, Glucose 75, alk Phos 220, AST 40, WBC 16.04, Hgb 6.8, Platelet 662, PT 14.5, INR 1.26, PTT 25.0, Occult stool Neg.,     Vitals: Tem 98.7, , /100, RR 20, 96% RA, (2019 22:23)    PERTINENT PM/SXH:   No pertinent past medical history    No significant past surgical history    FAMILY HISTORY:  Family history of pancreatic cancer (Father)  Family history of breast cancer in sister    ITEMS NOT CHECKED ARE NOT PRESENT    SOCIAL HISTORY:   Significant other/partner:  [ ]  Children:  [ ]  Restorationist/Spirituality:  Substance hx:  [ ]   Tobacco hx:  [ ]   Alcohol hx: [ ]   Home Opioid hx:  [ ] I-Stop Reference No:  Living Situation: [ ]Home  [ ]Long term care  [ ]Rehab [ ]Other    ADVANCE DIRECTIVES:    DNR  MOLST  [ ]  Living Will  [ ]   DECISION MAKER(s):  [ ] Health Care Proxy(s)  [ ] Surrogate(s)  [ ] Guardian           Name(s): Phone Number(s):    BASELINE (I)ADL(s) (prior to admission):  [place]: [ ]Total  [ ] Moderate [ ]Dependent    Allergies    No Known Allergies    Intolerances    MEDICATIONS  (STANDING):  amLODIPine   Tablet 5 milliGRAM(s) Oral daily  Dakins Solution - 1/4 Strength 1 Application(s) Topical daily  enoxaparin Injectable 40 milliGRAM(s) SubCutaneous daily  influenza   Vaccine 0.5 milliLiter(s) IntraMuscular once  lactobacillus acidophilus 1 Tablet(s) Oral every 12 hours  metroNIDAZOLE    Tablet 1000 milliGRAM(s) Oral daily  sodium chloride 0.9%. 1000 milliLiter(s) (100 mL/Hr) IV Continuous <Continuous>    MEDICATIONS  (PRN):  acetaminophen   Tablet .. 650 milliGRAM(s) Oral every 6 hours PRN Mild Pain (1 - 3), Moderate Pain (4 - 6)    PRESENT SYMPTOMS: [ ]Unable to obtain due to poor mentation   Source if other than patient:  [ ]Family   [ ]Team     Pain (Impact on QOL):    Location -         Minimal acceptable level (0-10 scale):                    Aggravating factors -  Quality -  Radiation -  Severity (0-10 scale) -    Timing -    PAIN AD Score:     http://geriatrictoolkit.[facility]/cog/painad.pdf (press ctrl +  left click to view)    Dyspnea:                           [ ]Mild [ ]Moderate [ ]Severe  Anxiety:                             [ ]Mild [ ]Moderate [ ]Severe  Fatigue:                             [ ]Mild [ ]Moderate [ ]Severe  Nausea:                             [ ]Mild [ ]Moderate [ ]Severe  Loss of appetite:              [ ]Mild [ ]Moderate [ ]Severe  Constipation:                    [ ]Mild [ ]Moderate [ ]Severe    Other Symptoms:  [ ]All other review of systems negative     Karnofsky Performance Score/Palliative Performance Status Version 2:         %    http://palliative.info/resource_material/PPSv2.pdf  PHYSICAL EXAM:  Vital Signs Last 24 Hrs  T(C): 37 (2019 06:12), Max: 37 (2019 06:12)  T(F): 98.6 (2019 06:12), Max: 98.6 (2019 06:12)  HR: 104 (2019 06:12) (104 - 113)  BP: 161/95 (2019 06:12) (156/96 - 161/95)  BP(mean): --  RR: 17 (2019 06:12) (17 - 17)  SpO2: 96% (2019 06:12) (96% - 96%) I&O's Summary    2019 07:01  -  2019 07:00  --------------------------------------------------------  IN: 200 mL / OUT: 0 mL / NET: 200 mL    2019 07:01  -  2019 14:10  --------------------------------------------------------  IN: 150 mL / OUT: 0 mL / NET: 150 mL    GENERAL:  [ ]Alert  [ ]Oriented x   [ ]Lethargic  [ ]Cachexia  [ ]Unarousable  [ ]Verbal  [ ]Non-Verbal  Behavioral:   [ ] Anxiety  [ ] Delirium [ ] Agitation [ ] Other  HEENT:  [ ]Normal   [ ]Dry mouth   [ ]ET Tube/Trach  [ ]Oral lesions  PULMONARY:   [ ]Clear [ ]Tachypnea  [ ]Audible excessive secretions   [ ]Rhonchi        [ ]Right [ ]Left [ ]Bilateral  [ ]Crackles        [ ]Right [ ]Left [ ]Bilateral  [ ]Wheezing     [ ]Right [ ]Left [ ]Bilateral  CARDIOVASCULAR:    [ ]Regular [ ]Irregular [ ]Tachy  [ ]Aly [ ]Murmur [ ]Other  GASTROINTESTINAL:  [ ]Soft  [ ]Distended   [ ]+BS  [ ]Non tender [ ]Tender  [ ]PEG [ ]OGT/ NGT  Last BM:   GENITOURINARY:  [ ]Normal [ ] Incontinent   [ ]Oliguria/Anuria   [ ]Waller  MUSCULOSKELETAL:   [ ]Normal   [ ]Weakness  [ ]Bed/Wheelchair bound [ ]Edema  NEUROLOGIC:   [ ]No focal deficits  [ ] Cognitive impairment  [ ] Dysphagia [ ]Dysarthria [ ] Paresis [ ]Other   SKIN:   [ ]Normal   [ ]Pressure ulcer(s)  [ ]Rash    CRITICAL CARE:  [ ] Shock Present  [ ]Septic [ ]Cardiogenic [ ]Neurologic [ ]Hypovolemic  [ ]  Vasopressors [ ]  Inotropes   [ ] Respiratory failure present  [ ] Acute  [ ] Chronic [ ] Hypoxic  [ ] Hypercarbic [ ] Other  [ ] Other organ failure     LABS:                        7.7    16.52 )-----------( 406      ( 2019 07:15 )             25.2   02-    141  |  105  |  6<L>  ----------------------------<  77  3.8   |  23  |  0.40<L>    Ca    9.0      2019 07:15  Phos  2.0     -  Mg     1.8         TPro  6.0  /  Alb  1.9<L>  /  TBili  0.5  /  DBili  x   /  AST  53<H>  /  ALT  7   /  AlkPhos  278<H>          RADIOLOGY & ADDITIONAL STUDIES:    PROTEIN CALORIE MALNUTRITION PRESENT: [ ] Yes [ ] No  [ ] PPSV2 < or = to 30% [ ] significant weight loss  [ ] poor nutritional intake [ ] catabolic state [ ] anasarca     Albumin, Serum: 1.9 g/dL (19 @ 07:15)  Artificial Nutrition [ ]     REFERRALS:   [ ]Chaplaincy  [ ] Hospice  [ ]Child Life  [ ]Social Work  [ ]Case management [ ]Holistic Therapy   Goals of Care Discussion Document:       Anselmo Lee, PGY-3 Pager#[phone]    ***To Be Reviewed with Attending HPI:  67 y/o Female no known pmhx presents with malodorous huge R fungating breast mass with SOB and weight loss (25 lbs in 2 mths) x several weeks. Pt does not go to the doctor and denies taking any meds but endorses that "God will take care of her". Pt complains of falling on the stairs a week ago and  has  difficulty ambulating to bathroom due to weakness so has urinated on herself at times,  +dysuria and back pain that is non-radiating.    Patient was found to have symptomatic anemia, initially refusing PRBC transfusion but now s/p 1U. Her imaging confirmed R fungating Breast mass highly concerning for malignancy with likely metastases to lung and brain. Patient is unsure if she would like to pursue further treatment. Surg Onc offered palliative mastectomy and Onc offered biopsy for tissue diagnosis. Patient understands this is likely cancer but she is unsure whether she would pursue further treatment. She reports ongoing dyspnea, describes a sensation of her breath being cut short. She denies pain but reports discomfort due to the large R breast mass. When asked why she did not seek medical care earlier, she does not have a reason.     PERTINENT PM/SXH:   No pertinent past medical history  No significant past surgical history    FAMILY HISTORY:  Family history of pancreatic cancer (Father)  Family history of breast cancer in sister    ITEMS NOT CHECKED ARE NOT PRESENT    SOCIAL HISTORY:   Significant other/partner:  [ ]  Children:  [x]  Buddhism/Spirituality: Non-Tenriism Hindu  Substance hx:  [ ]   Tobacco hx:  [x]   Alcohol hx: [ ]   Home Opioid hx:  [ ] I-Stop Reference No:  Living Situation: [x]Home  [ ]Long term care  [ ]Rehab [ ]Other    ADVANCE DIRECTIVES:    DNR  MOLST  [ ]  Living Will  [ ]   DECISION MAKER(s):  [ ] Health Care Proxy(s)  [x] Surrogate(s)  [ ] Guardian           Name(s): Sandy Puente Phone Number(s): [phone]    BASELINE (I)ADL(s) (prior to admission):  [place]: [x]Total  [ ] Moderate [ ]Dependent    Allergies  No Known Allergies    MEDICATIONS  (STANDING):  amLODIPine   Tablet 5 milliGRAM(s) Oral daily  Dakins Solution - 1/4 Strength 1 Application(s) Topical daily  enoxaparin Injectable 40 milliGRAM(s) SubCutaneous daily  influenza   Vaccine 0.5 milliLiter(s) IntraMuscular once  lactobacillus acidophilus 1 Tablet(s) Oral every 12 hours  metroNIDAZOLE    Tablet 1000 milliGRAM(s) Oral daily  sodium chloride 0.9%. 1000 milliLiter(s) (100 mL/Hr) IV Continuous <Continuous>    MEDICATIONS  (PRN):  acetaminophen   Tablet .. 650 milliGRAM(s) Oral every 6 hours PRN Mild Pain (1 - 3), Moderate Pain (4 - 6)    PRESENT SYMPTOMS: [ ]Unable to obtain due to poor mentation   Source if other than patient:  [ ]Family   [ ]Team     Pain (Impact on QOL):    Location -         Minimal acceptable level (0-10 scale):                    Aggravating factors -  Quality -  Radiation -  Severity (0-10 scale) -    Timing -    Dyspnea:                           [ ]Mild [x]Moderate [ ]Severe  Anxiety:                             [ ]Mild [ ]Moderate [ ]Severe  Fatigue:                             [ ]Mild [ ]Moderate [ ]Severe  Nausea:                             [ ]Mild [ ]Moderate [ ]Severe  Loss of appetite:              [ ]Mild [x]Moderate [ ]Severe  Constipation:                    [ ]Mild [ ]Moderate [ ]Severe    Other Symptoms:  [x]All other review of systems negative     Karnofsky Performance Score/Palliative Performance Status Version 2:         40%    http://palliative.info/resource_material/PPSv2.pdf  PHYSICAL EXAM:  Vital Signs Last 24 Hrs  T(C): 37 (04 Feb 2019 06:12), Max: 37 (04 Feb 2019 06:12)  T(F): 98.6 (04 Feb 2019 06:12), Max: 98.6 (04 Feb 2019 06:12)  HR: 104 (04 Feb 2019 06:12) (104 - 113)  BP: 161/95 (04 Feb 2019 06:12) (156/96 - 161/95)  RR: 17 (04 Feb 2019 06:12) (17 - 17)  SpO2: 96% (04 Feb 2019 06:12) (96% - 96%) I&O's Summary    03 Feb 2019 07:01  -  04 Feb 2019 07:00  --------------------------------------------------------  IN: 200 mL / OUT: 0 mL / NET: 200 mL    04 Feb 2019 07:01  -  04 Feb 2019 14:10  --------------------------------------------------------  IN: 150 mL / OUT: 0 mL / NET: 150 mL    GENERAL:  [x]Alert  [x]Oriented x3   [ ]Lethargic  [x]Cachexia  [ ]Unarousable  [x]Verbal  [ ]Non-Verbal  Behavioral:   [ ] Anxiety  [ ] Delirium [ ] Agitation [ ] Other  HEENT:  [ ]Normal   [x]Dry mouth   [ ]ET Tube/Trach  [ ]Oral lesions  PULMONARY:   [x]Clear [x]Tachypnea  [ ]Audible excessive secretions   [ ]Rhonchi        [ ]Right [ ]Left [ ]Bilateral  [ ]Crackles        [ ]Right [ ]Left [ ]Bilateral  [ ]Wheezing     [ ]Right [ ]Left [ ]Bilateral  CARDIOVASCULAR:    [ ]Regular [ ]Irregular [ ]Tachy  [ ]Aly [ ]Murmur [ ]Other  GASTROINTESTINAL:  [x]Soft  [ ]Distended   [x]+BS  [x]Non tender [ ]Tender  [ ]PEG [ ]OGT/ NGT  Last BM:   GENITOURINARY:  [x]Normal [ ] Incontinent   [ ]Oliguria/Anuria   [ ]Waller  MUSCULOSKELETAL:   [ ]Normal   [x]Weakness  [ ]Bed/Wheelchair bound [ ]Edema  NEUROLOGIC:   [x]No focal deficits  [ ] Cognitive impairment  [ ] Dysphagia [ ]Dysarthria [ ] Paresis [ ]Other   SKIN:   [ ]Normal   [ ]Pressure ulcer(s)  [x] Fungating R Breast mass    LABS:             7.7    16.52 )-----------( 406      ( 04 Feb 2019 07:15 )             25.2     141  |  105  |  6<L>  ----------------------------<  77  3.8   |  23  |  0.40<L>    Ca    9.0      04 Feb 2019 07:15  Phos  2.0     02-04  Mg     1.8     02-04  TPro  6.0  /  Alb  1.9<L>  /  TBili  0.5  /  DBili  x   /  AST  53<H>  /  ALT  7   /  AlkPhos  278<H>  02-04      RADIOLOGY & ADDITIONAL STUDIES:  <CT Angio Chest / Abdomen & Pelvis w/ IV Cont (02.02.19 @ 21:01) >  LUNGS AND LARGE AIRWAYS: Patent central airways. Multiple bilateral intraparenchymal masses in the random distribution, representative measurements include: Peripheral right lower lobe mass measuring 4.9 x 6.1 cm, peripheral left lower lobe mass measuring 5.4 x 3.3 cm. Centrilobular emphysema.  PLEURA: Trace bilateral pleural effusions. No pneumothorax.  VESSELS: Suboptimal opacification of the pulmonary arteries. No filling defect in the main, right or left unremarkable cerebellar branches. Evaluation of some lobar and segmental branches is limited secondary to poor opacification and mixing artifact.  HEART: Heart size is normal. No pericardial effusion.  MEDIASTINUM AND CHERELLE: Significant mediastinal and right hilar adenopathy with multiple confluent nodes, example measurement includes a left upper paratracheal node measuring up to 3.6 x 3.0 cm (4, 173). The right hilar and mediastinal masses external mass effect without significant compression of the right main pulmonary artery. Right supraclavicular node measuring 2.3 cm (2, 10).    CHEST WALL AND LOWER NECK: Large heterogeneously enhancing right breast mass measuring 10.9 x 14.2 x 20.1 cm, with central area of hypodensity which may represent internal necrosis. Additional well-circumscribed mass lateral to the left axilla measures 3.0 x 2.6 cm (2, 27). Hypodense nodule in the left thyroid lobe measuring1.3 cm.    ABDOMEN AND PELVIS:  LIVER: Within normal limits.  BILE DUCTS: Normal caliber.  GALLBLADDER: Within normal limits.  SPLEEN: Within normal limits.  PANCREAS: Within normal limits.  ADRENALS: Right adrenal nodule measuring 2.5 x 1.6 cm (3, 34). Nodular appearance of the left adrenal gland.  KIDNEYS/URETERS: Indeterminate left kidney 1.7 cm hypodensity lower pole focus.  BLADDER: Linear calcifications along the right lateral bladder wall. This may represent layering stones.  REPRODUCTIVE ORGANS: Myomatous uterus. Endometrial canal is distended measuring up to 1.2 cm with central hypodensity. No adnexal masses.  BOWEL: No bowel obstruction.   PERITONEUM: No ascites.  VESSELS:  Atherosclerotic calcifications.  RETROPERITONEUM: No retroperitoneal lymphadenopathy.  ABDOMINAL WALL: Subcutaneous edema along the left lateral flank.  BONES: Multilevel degenerative changes.    IMPRESSION:  No pulmonary embolism.  Right breast mass measuring up to10.9 cm represents patient's known breast cancer. Multiple pulmonary masses and hilar/mediastinal lymphadenopathy represent metastatic disease.  No acute intra-abdominal pathology.  Right adrenal nodule may represent metastatic disease as well.    PROTEIN CALORIE MALNUTRITION PRESENT: [x] Yes [ ] No  [ ] PPSV2 < or = to 30% [x] significant weight loss  [x] poor nutritional intake [ ] catabolic state [ ] anasarca     Albumin, Serum: 1.9 g/dL (02-04-19 @ 07:15)  Artificial Nutrition [ ]     REFERRALS:   [x]Chaplaincy  [ ] Hospice  [ ]Child Life  [ ]Social Work  [ ]Case management [ ]Holistic Therapy   Goals of Care Discussion Document:       Anselmo Lee, PGY-3 Pager#[phone]  ***To Be Reviewed with Attending

## 2019-02-04 NOTE — CONSULT NOTE ADULT - PROBLEM SELECTOR RECOMMENDATION 2
Highly concerning for malignancy with potential spread of disease to lungs/LN/brain but no definitive tissue biposy at this time.  -would d/c Dakins from breast wound care regimen, highly irritating  -patient is unsure whether she would puruse any kind of chemo/other therapy so she has not made a decision re: biopsy or mastectomy  -will try to reach a decision with assistance from daughter Highly concerning for malignancy with potential spread of disease to lungs/LN/brain but no definitive tissue biposy at this time.  -would d/c Dakins from breast wound care regimen, highly irritating  -patient is unsure whether she would pursue any kind of chemo/other therapy so she has not made a decision re: biopsy or mastectomy  -will try to reach a decision with assistance from daughter

## 2019-02-04 NOTE — CONSULT NOTE ADULT - ASSESSMENT
69yo Woman w/ Fungating R Breast Mass presenting with SOB found to have symptomatic anemia in the setting of likely malignancy with potential metastatic disease to lungs/brain.

## 2019-02-04 NOTE — PROGRESS NOTE ADULT - ATTENDING COMMENTS
Patient seen and examined.  Case discussed with house staff.  Agree with above as edited.   Patient with large fungating breast lesion, likely metastatic breast cancer with suggested mets to LNs, right adrenal and supsicion for intracranial mets on CT. Plan for MRI brain to evaluate mets and edema. Neurosurg c/s appreciated. Palliative care c/s appreciated. Ongoing [facility] discussions. Pain of neoplasm - started prn oxycodone

## 2019-02-04 NOTE — CONSULT NOTE ADULT - PROBLEM SELECTOR RECOMMENDATION 4
Discussed patient's understanding of her medical situation. She appreciates the concern for "tumors" that likely spread from her breast. She remains non-commital regarding potential biopsy or palliative mastectomy, she is unsure whether she would pursue treatment.  -will follow for ongoing symptom management and GOC  -unable to reach daughter but will try to coordinate meeting to discuss current status and planning

## 2019-02-04 NOTE — PROGRESS NOTE ADULT - PROBLEM SELECTOR PLAN 1
Patient wants daughter (Sandy) and co- (Adriano Galan) to be her HCP  -Full code. Has capacity. Strong mariola that God will heal her  -now agreeing to speak with palliative care doctors, will evaluate for home hospice  - patient is  in Buddhism  - currently agrees to blood transfusions. Still undecided about palliative surgery offered by surg onc Patient wants daughter (Sandy) and co- (Adriano Galan) to be her HCP  -Full code. Has capacity. Strong mariola that God will heal her  -now agreeing to speak with palliative care doctors, will evaluate for home hospice  - patient is  in [place]  - currently agrees to blood transfusions. Still undecided about palliative surgery offered by surg onc  - palliative med consulted - f/u rec's

## 2019-02-04 NOTE — CONSULT NOTE ADULT - PROBLEM SELECTOR RECOMMENDATION 3
Significant cachexia and poor intake in the setting of likely extensive malignancy.  -encourage PO intake, Ensure supplements as tolerated

## 2019-02-04 NOTE — PROGRESS NOTE ADULT - PROBLEM SELECTOR PLAN 2
Presents with R fungating breast mass, + Malodorous, + Drainage found to have likely mets to brain and lungs  -Wound care recs in place  - Surgery recommended palliative mastectomy, however patient says she needs time to think about it  -Per CTA chest, neg for PE though there is mass effect on RUL pulmonary artery. Currently breathing on RA.  -CTH showing L temporal lobe lesion with surrounding edema, neurosurg consult placed Presents with R fungating breast mass, + Malodorous, + Drainage found to have likely mets to brain and lungs  -Wound care recs in place  - Surgery recommended palliative mastectomy, however patient says she needs time to think about it  -Per CTA chest, neg for PE though there is mass effect on RUL pulmonary artery. Currently breathing on RA.  -CTH showing L temporal lobe lesion with surrounding edema, neurosurg following. Will get MRI to further evaluate.  -Pelvic Xrays largely benign

## 2019-02-05 DIAGNOSIS — D72.829 ELEVATED WHITE BLOOD CELL COUNT, UNSPECIFIED: ICD-10-CM

## 2019-02-05 DIAGNOSIS — Z71.89 OTHER SPECIFIED COUNSELING: ICD-10-CM

## 2019-02-05 DIAGNOSIS — N39.0 URINARY TRACT INFECTION, SITE NOT SPECIFIED: ICD-10-CM

## 2019-02-05 LAB
ANION GAP SERPL CALC-SCNC: 12 MMO/L — SIGNIFICANT CHANGE UP (ref 7–14)
BUN SERPL-MCNC: 6 MG/DL — LOW (ref 7–23)
CALCIUM SERPL-MCNC: 8.6 MG/DL — SIGNIFICANT CHANGE UP (ref 8.4–10.5)
CHLORIDE SERPL-SCNC: 103 MMOL/L — SIGNIFICANT CHANGE UP (ref 98–107)
CO2 SERPL-SCNC: 25 MMOL/L — SIGNIFICANT CHANGE UP (ref 22–31)
CREAT SERPL-MCNC: 0.45 MG/DL — LOW (ref 0.5–1.3)
GLUCOSE SERPL-MCNC: 65 MG/DL — LOW (ref 70–99)
HCT VFR BLD CALC: 25.5 % — LOW (ref 34.5–45)
HGB BLD-MCNC: 7.6 G/DL — LOW (ref 11.5–15.5)
MAGNESIUM SERPL-MCNC: 1.8 MG/DL — SIGNIFICANT CHANGE UP (ref 1.6–2.6)
MCHC RBC-ENTMCNC: 28.1 PG — SIGNIFICANT CHANGE UP (ref 27–34)
MCHC RBC-ENTMCNC: 29.8 % — LOW (ref 32–36)
MCV RBC AUTO: 94.4 FL — SIGNIFICANT CHANGE UP (ref 80–100)
NRBC # FLD: 0 K/UL — LOW (ref 25–125)
PHOSPHATE SERPL-MCNC: 2.7 MG/DL — SIGNIFICANT CHANGE UP (ref 2.5–4.5)
PLATELET # BLD AUTO: 396 K/UL — SIGNIFICANT CHANGE UP (ref 150–400)
PMV BLD: 9.6 FL — SIGNIFICANT CHANGE UP (ref 7–13)
POTASSIUM SERPL-MCNC: 4.2 MMOL/L — SIGNIFICANT CHANGE UP (ref 3.5–5.3)
POTASSIUM SERPL-SCNC: 4.2 MMOL/L — SIGNIFICANT CHANGE UP (ref 3.5–5.3)
RBC # BLD: 2.7 M/UL — LOW (ref 3.8–5.2)
RBC # FLD: 19.6 % — HIGH (ref 10.3–14.5)
SODIUM SERPL-SCNC: 140 MMOL/L — SIGNIFICANT CHANGE UP (ref 135–145)
WBC # BLD: 14.9 K/UL — HIGH (ref 3.8–10.5)
WBC # FLD AUTO: 14.9 K/UL — HIGH (ref 3.8–10.5)

## 2019-02-05 PROCEDURE — 99233 SBSQ HOSP IP/OBS HIGH 50: CPT | Mod: GC

## 2019-02-05 PROCEDURE — 70553 MRI BRAIN STEM W/O & W/DYE: CPT | Mod: 26

## 2019-02-05 PROCEDURE — 99232 SBSQ HOSP IP/OBS MODERATE 35: CPT

## 2019-02-05 RX ADMIN — ENOXAPARIN SODIUM 40 MILLIGRAM(S): 100 INJECTION SUBCUTANEOUS at 12:54

## 2019-02-05 RX ADMIN — OXYCODONE HYDROCHLORIDE 2.5 MILLIGRAM(S): 5 TABLET ORAL at 19:15

## 2019-02-05 RX ADMIN — OXYCODONE HYDROCHLORIDE 2.5 MILLIGRAM(S): 5 TABLET ORAL at 18:40

## 2019-02-05 RX ADMIN — Medication 1000 MILLIGRAM(S): at 12:54

## 2019-02-05 RX ADMIN — AMLODIPINE BESYLATE 5 MILLIGRAM(S): 2.5 TABLET ORAL at 05:19

## 2019-02-05 RX ADMIN — Medication 1 TABLET(S): at 18:01

## 2019-02-05 RX ADMIN — Medication 1 TABLET(S): at 09:37

## 2019-02-05 NOTE — PROGRESS NOTE ADULT - PROBLEM SELECTOR PLAN 5
Filled out HCP form with patient. Daughter (Sandy Puente) at bedside, named HCP. Co- Adriano Galan named as alternate proxy. Form placed in chart, copy provided to daughter.

## 2019-02-05 NOTE — PROGRESS NOTE ADULT - SUBJECTIVE AND OBJECTIVE BOX
***************************************************************  Micha Bear MD Pgy-1  Contact/Pager [phone] / [phone]  ***************************************************************    TOMEKA PEREZ  68y  MRN: [id number]      Patient is a 68y old  Female who presents with a chief complaint of Symptomatic anemia, Rt Fungating Breast mass , (04 Feb 2019 14:09)      Subjective/ON Events: Overnight pt refused MRI due to late scheduling hour, preferred to sleep. Will try again today. Evaluated yesterday afternoon by palliative care team. Pt remains undecided / non-committal to treatment.      MEDICATIONS  (STANDING):  amLODIPine   Tablet 5 milliGRAM(s) Oral daily  enoxaparin Injectable 40 milliGRAM(s) SubCutaneous daily  influenza   Vaccine 0.5 milliLiter(s) IntraMuscular once  lactobacillus acidophilus 1 Tablet(s) Oral every 12 hours  metroNIDAZOLE    Tablet 1000 milliGRAM(s) Oral daily  sodium chloride 0.9%. 1000 milliLiter(s) (100 mL/Hr) IV Continuous <Continuous>    MEDICATIONS  (PRN):  acetaminophen   Tablet .. 650 milliGRAM(s) Oral every 6 hours PRN Mild Pain (1 - 3), Moderate Pain (4 - 6)  oxyCODONE    IR 2.5 milliGRAM(s) Oral every 6 hours PRN Severe Pain (7 - 10)        Objective:    Vitals: Vital Signs Last 24 Hrs  T(C): 36.9 (02-05-19 @ 05:11), Max: 37.2 (02-04-19 @ 14:15)  T(F): 98.4 (02-05-19 @ 05:11), Max: 98.9 (02-04-19 @ 14:15)  HR: 106 (02-05-19 @ 05:11) (106 - 110)  BP: 155/89 (02-05-19 @ 05:11) (152/98 - 159/105)  RR: 18 (02-05-19 @ 05:11) (18 - 20)  SpO2: 96% (02-05-19 @ 05:11) (96% - 98%)                  I&O's Summary    04 Feb 2019 07:01  -  05 Feb 2019 07:00  --------------------------------------------------------  IN: 150 mL / OUT: 250 mL / NET: -100 mL        PHYSICAL EXAM:  GENERAL: NAD  HEAD:  Atraumatic  EYES: EOMI, PERRLA, conjunctiva and sclera clear  NECK: Supple, No JVD  CHEST/LUNG: Clear to auscultation bilaterally, poor inspiratory effort; No wheeze, on RA  HEART: Regular rate and rhythm; No murmurs, rubs, or gallops  ABDOMEN: Soft, Nontender, Nondistended; Bowel sounds present  EXTREMITIES:  2+ Peripheral Pulses, No clubbing, cyanosis, or edema  NEURO/PSYCH: AAOx3, nonfocal  SKIN: No rashes. Ulcerated fungating breast mass                                             LABS:  02-04    141  |  105  |  6<L>  ----------------------------<  77  3.8   |  23  |  0.40<L>  02-03    141  |  103  |  7   ----------------------------<  73  3.2<L>   |  23  |  0.41<L>    Ca    9.0      04 Feb 2019 07:15  Ca    8.3<L>      03 Feb 2019 05:00  Phos  2.0     02-04  Mg     1.8     02-04    TPro  6.0  /  Alb  1.9<L>  /  TBili  0.5  /  DBili  x   /  AST  53<H>  /  ALT  7   /  AlkPhos  278<H>  02-04  TPro  5.7<L>  /  Alb  1.9<L>  /  TBili  0.6  /  DBili  x   /  AST  50<H>  /  ALT  7   /  AlkPhos  224<H>  02-03                          7.7    16.52 )-----------( 406      ( 04 Feb 2019 07:15 )             25.2                         7.4    16.79 )-----------( 437      ( 03 Feb 2019 05:00 )             23.9 ***************************************************************  Micha Bear MD Pgy-1  Contact/Pager [phone] / [phone]  ***************************************************************    TOMEKA PEREZ  68y  MRN: [id number]      Patient is a 68y old  Female who presents with a chief complaint of Symptomatic anemia, Rt Fungating Breast mass , (04 Feb 2019 14:09)      Subjective/ON Events: Overnight pt refused MRI due to late scheduling hour, preferred to sleep. Will try again this afternoon - pt prefers family be present while she gets MRI. Evaluated yesterday afternoon and today by palliative care team. Pt still overall undecided but seems to be leaning towards treatment. Planning for GOC discussion this afternoon w/ daughter and Co- from [place].      MEDICATIONS  (STANDING):  amLODIPine   Tablet 5 milliGRAM(s) Oral daily  enoxaparin Injectable 40 milliGRAM(s) SubCutaneous daily  influenza   Vaccine 0.5 milliLiter(s) IntraMuscular once  lactobacillus acidophilus 1 Tablet(s) Oral every 12 hours  metroNIDAZOLE    Tablet 1000 milliGRAM(s) Oral daily  sodium chloride 0.9%. 1000 milliLiter(s) (100 mL/Hr) IV Continuous <Continuous>    MEDICATIONS  (PRN):  acetaminophen   Tablet .. 650 milliGRAM(s) Oral every 6 hours PRN Mild Pain (1 - 3), Moderate Pain (4 - 6)  oxyCODONE    IR 2.5 milliGRAM(s) Oral every 6 hours PRN Severe Pain (7 - 10)        Objective:    Vitals: Vital Signs Last 24 Hrs  T(C): 36.9 (02-05-19 @ 05:11), Max: 37.2 (02-04-19 @ 14:15)  T(F): 98.4 (02-05-19 @ 05:11), Max: 98.9 (02-04-19 @ 14:15)  HR: 106 (02-05-19 @ 05:11) (106 - 110)  BP: 155/89 (02-05-19 @ 05:11) (152/98 - 159/105)  RR: 18 (02-05-19 @ 05:11) (18 - 20)  SpO2: 96% (02-05-19 @ 05:11) (96% - 98%)                  I&O's Summary    04 Feb 2019 07:01  -  05 Feb 2019 07:00  --------------------------------------------------------  IN: 150 mL / OUT: 250 mL / NET: -100 mL        PHYSICAL EXAM:  GENERAL: NAD  HEAD:  Atraumatic  EYES: EOMI, PERRLA, conjunctiva and sclera clear  NECK: Supple, No JVD  CHEST/LUNG: Clear to auscultation bilaterally, poor inspiratory effort; No wheeze, on RA.  HEART: Regular rate and rhythm; No murmurs, rubs, or gallops  ABDOMEN: Soft, Nontender, Nondistended; Bowel sounds present  EXTREMITIES:  2+ Peripheral Pulses, No clubbing, cyanosis, or edema  NEURO/PSYCH: AAOx3, nonfocal  SKIN: No rashes. Ulcerated fungating breast mass                                             LABS:  02-04    141  |  105  |  6<L>  ----------------------------<  77  3.8   |  23  |  0.40<L>  02-03    141  |  103  |  7   ----------------------------<  73  3.2<L>   |  23  |  0.41<L>    Ca    9.0      04 Feb 2019 07:15  Ca    8.3<L>      03 Feb 2019 05:00  Phos  2.0     02-04  Mg     1.8     02-04    TPro  6.0  /  Alb  1.9<L>  /  TBili  0.5  /  DBili  x   /  AST  53<H>  /  ALT  7   /  AlkPhos  278<H>  02-04  TPro  5.7<L>  /  Alb  1.9<L>  /  TBili  0.6  /  DBili  x   /  AST  50<H>  /  ALT  7   /  AlkPhos  224<H>  02-03                          7.7    16.52 )-----------( 406      ( 04 Feb 2019 07:15 )             25.2                         7.4    16.79 )-----------( 437      ( 03 Feb 2019 05:00 )             23.9

## 2019-02-05 NOTE — PROGRESS NOTE ADULT - ATTENDING COMMENTS
Hugh Dias  Attending Physician   Division of Infectious Disease  Pager #[phone]  After 5pm/weekend or no response, call #[phone] Hugh Dias  Attending Physician   Division of Infectious Disease  Pager #[phone]  After 5pm/weekend or no response, call #[phone]    Will sign off, recall ID if needed #[phone].

## 2019-02-05 NOTE — PROGRESS NOTE ADULT - ASSESSMENT
69 y/o Female no known pmhx presents with malodorous huge R fungating breast mass with SOB and weight loss (25 lbs in 2 mths) x several weeks. Has also reported dysuria/urgency on presentation.   Afebrile, leukocytosis increased to 19, anemia, ALP elevated to 227. UA mildly positive. Urine culture with E coli.    Right fungating breast mass - Ca breast , could have superimposed infection at skin and soft tissue from fungating mass.  +UTI

## 2019-02-05 NOTE — PROVIDER CONTACT NOTE (OTHER) - ASSESSMENT
Patient refusing MRI at this time despite encouragement to go and letting pt know that it may be several days until they call her back down based on the schedule

## 2019-02-05 NOTE — PROGRESS NOTE ADULT - SUBJECTIVE AND OBJECTIVE BOX
SUBJECTIVE AND OBJECTIVE  INTERVAL HPI/OVERNIGHT EVENTS:    DNR on chart: X    Allergies  No Known Allergies    MEDICATIONS  (STANDING):  amLODIPine   Tablet 5 milliGRAM(s) Oral daily  enoxaparin Injectable 40 milliGRAM(s) SubCutaneous daily  influenza   Vaccine 0.5 milliLiter(s) IntraMuscular once  lactobacillus acidophilus 1 Tablet(s) Oral every 12 hours  metroNIDAZOLE    Tablet 1000 milliGRAM(s) Oral daily  sodium chloride 0.9%. 1000 milliLiter(s) (100 mL/Hr) IV Continuous <Continuous>    MEDICATIONS  (PRN):  acetaminophen   Tablet .. 650 milliGRAM(s) Oral every 6 hours PRN Mild Pain (1 - 3), Moderate Pain (4 - 6)  oxyCODONE    IR 2.5 milliGRAM(s) Oral every 6 hours PRN Severe Pain (7 - 10)    ITEMS UNCHECKED ARE NOT PRESENT    PRESENT SYMPTOMS: [ ]Unable to obtain due to poor mentation   Source if other than patient:  [ ]Family   [ ]Team     Pain (Impact on QOL):    Location:  Minimal acceptable level (0-10 scale):            Aggravating factors:  Quality:  Radiation:  Severity (0-10 scale):    Timing:    Dyspnea:                           [ ]Mild [ ]Moderate [ ]Severe  Anxiety:                             [ ]Mild [ ]Moderate [ ]Severe  Fatigue:                             [ ]Mild [ ]Moderate [ ]Severe  Nausea:                             [ ]Mild [ ]Moderate [ ]Severe  Loss of appetite:              [ ]Mild [ ]Moderate [ ]Severe  Constipation:                    [ ]Mild [ ]Moderate [ ]Severe    Other Symptoms:  [ ]All other review of systems negative     Karnofsky Performance Score/Palliative Performance Status Version 2:         %    http://palliative.info/resource_material/PPSv2.pdf    PHYSICAL EXAM:  Vital Signs Last 24 Hrs  T(C): 36.9 (05 Feb 2019 05:11), Max: 37.2 (04 Feb 2019 14:15)  T(F): 98.4 (05 Feb 2019 05:11), Max: 98.9 (04 Feb 2019 14:15)  HR: 106 (05 Feb 2019 05:11) (106 - 110)  BP: 155/89 (05 Feb 2019 05:11) (152/98 - 159/105)  RR: 18 (05 Feb 2019 05:11) (18 - 20)  SpO2: 96% (05 Feb 2019 05:11) (96% - 98%) I&O's Summary    04 Feb 2019 07:01  -  05 Feb 2019 07:00  --------------------------------------------------------  IN: 150 mL / OUT: 250 mL / NET: -100 mL     GENERAL:  [ ]Alert  [ ]Oriented x   [ ]Lethargic  [ ]Cachexia  [ ]Unarousable  [ ]Verbal  [ ]Non-Verbal    Behavioral:   [ ] Anxiety  [ ] Delirium [ ] Agitation [ ] Other    HEENT:  [ ]Normal   [ ]Dry mouth   [ ]ET Tube/Trach  [ ]Oral lesions    PULMONARY:   [ ]Clear [ ]Tachypnea  [ ]Audible excessive secretions   [ ]Rhonchi        [ ]Right [ ]Left [ ]Bilateral  [ ]Crackles        [ ]Right [ ]Left [ ]Bilateral  [ ]Wheezing     [ ]Right [ ]Left [ ]Bilateral    CARDIOVASCULAR:    [ ]Regular [ ]Irregular [ ]Tachy  [ ]Aly [ ]Murmur [ ]Other    GASTROINTESTINAL:  [ ]Soft  [ ]Distended   [ ]+BS  [ ]Non tender [ ]Tender  [ ]PEG [ ]OGT/ NGT   Last BM:    GENITOURINARY:  [ ]Normal [ ] Incontinent   [ ]Oliguria/Anuria   [ ]Waller    MUSCULOSKELETAL:   [ ]Normal   [ ]Weakness  [ ]Bed/Wheelchair bound [ ]Edema    NEUROLOGIC:   [ ]No focal deficits  [ ] Cognitive impairment  [ ] Dysphagia [ ]Dysarthria [ ] Paresis [ ]Other     SKIN:   [ ]Normal   [ ]Pressure ulcer(s)  [ ]Rash    CRITICAL CARE:  [ ] Shock Present  [ ] Septic [ ] Cardiogenic [ ] Neurologic [ ] Hypovolemic [ ] Vasopressors [ ] Inotropes   [ ] Respiratory failure present  [ ] Acute [ ] Chronic [ ] Hypoxic [ ] Hypercarbic [ ] Other  [ ] Other organ failure     LABS:             7.6    14.90 )-----------( 396      ( 05 Feb 2019 07:00 )             25.5     140  |  103  |  6<L>  ----------------------------<  65<L>  4.2   |  25  |  0.45<L>    Ca    8.6      05 Feb 2019 07:00  Phos  2.7     02-05  Mg     1.8     02-05    TPro  6.0  /  Alb  1.9<L>  /  TBili  0.5  /  DBili  x   /  AST  53<H>  /  ALT  7   /  AlkPhos  278<H>  02-04    RADIOLOGY & ADDITIONAL STUDIES:    Protein Calorie Malnutrition Present: [ ] yes [ ] no  [ ] PPSV2 < or = 30%  [ ] significant weight loss [ ] poor nutritional intake [ ] anasarca [ ] catabolic state Albumin, Serum: 1.9 g/dL (02-04-19 @ 07:15)  Artificial Nutrition [ ]     REFERRALS:   [ ]Chaplaincy  [ ] Hospice  [ ]Child Life  [ ]Social Work  [ ]Case management [ ]Holistic Therapy   Goals of Care Document:     Anselmo Lee, PGY-3 Pager#[phone]  ***To Be Reviewed with Attending SUBJECTIVE AND OBJECTIVE  INTERVAL HPI/OVERNIGHT EVENTS:  Patient declined MRI overnight as she wanted to sleep and it was late. Reports ongoing dyspnea, feels that the Oxy 5mg just put her to sleep. She received one pill @13:36. She is showing understanding of her disease process and states she wants to continuing living.     DNR on chart: X    Allergies  No Known Allergies    MEDICATIONS  (STANDING):  amLODIPine   Tablet 5 milliGRAM(s) Oral daily  enoxaparin Injectable 40 milliGRAM(s) SubCutaneous daily  influenza   Vaccine 0.5 milliLiter(s) IntraMuscular once  lactobacillus acidophilus 1 Tablet(s) Oral every 12 hours  metroNIDAZOLE    Tablet 1000 milliGRAM(s) Oral daily  sodium chloride 0.9%. 1000 milliLiter(s) (100 mL/Hr) IV Continuous <Continuous>    MEDICATIONS  (PRN):  acetaminophen   Tablet .. 650 milliGRAM(s) Oral every 6 hours PRN Mild Pain (1 - 3), Moderate Pain (4 - 6)  oxyCODONE    IR 2.5 milliGRAM(s) Oral every 6 hours PRN Severe Pain (7 - 10)    ITEMS UNCHECKED ARE NOT PRESENT    PRESENT SYMPTOMS: [ ]Unable to obtain due to poor mentation   Source if other than patient:  [ ]Family   [ ]Team     Pain (Impact on QOL): Irritating  Location: R Breast  Minimal acceptable level (0-10 scale):            Aggravating factors: Wound Care  Quality:  Radiation:  Severity (0-10 scale):    Timing: constant after wound care    Dyspnea:                           [ ]Mild [x]Moderate [ ]Severe  Anxiety:                             [ ]Mild [ ]Moderate [ ]Severe  Fatigue:                             [ ]Mild [ ]Moderate [ ]Severe  Nausea:                             [ ]Mild [ ]Moderate [ ]Severe  Loss of appetite:              [ ]Mild [ ]Moderate [ ]Severe  Constipation:                    [ ]Mild [ ]Moderate [ ]Severe    Other Symptoms:  [x]All other review of systems negative     Karnofsky Performance Score/Palliative Performance Status Version 2:        40%    http://palliative.info/resource_material/PPSv2.pdf    PHYSICAL EXAM:  Vital Signs Last 24 Hrs  T(C): 36.9 (05 Feb 2019 05:11), Max: 37.2 (04 Feb 2019 14:15)  T(F): 98.4 (05 Feb 2019 05:11), Max: 98.9 (04 Feb 2019 14:15)  HR: 106 (05 Feb 2019 05:11) (106 - 110)  BP: 155/89 (05 Feb 2019 05:11) (152/98 - 159/105)  RR: 18 (05 Feb 2019 05:11) (18 - 20)  SpO2: 96% (05 Feb 2019 05:11) (96% - 98%) I&O's Summary    04 Feb 2019 07:01  -  05 Feb 2019 07:00  --------------------------------------------------------  IN: 150 mL / OUT: 250 mL / NET: -100 mL     GENERAL:  [x]Alert  [x]Oriented x3   [ ]Lethargic  [ ]Cachexia  [ ]Unarousable  [x]Verbal  [ ]Non-Verbal    Behavioral:   [ ] Anxiety  [ ] Delirium [ ] Agitation [ ] Other    HEENT:  [x]Normal   [ ]Dry mouth   [ ]ET Tube/Trach  [ ]Oral lesions    PULMONARY:   [x]Clear [ ]Tachypnea  [ ]Audible excessive secretions   [ ]Rhonchi        [ ]Right [ ]Left [ ]Bilateral  [ ]Crackles        [ ]Right [ ]Left [ ]Bilateral  [ ]Wheezing     [ ]Right [ ]Left [ ]Bilateral    CARDIOVASCULAR:    [ ]Regular [ ]Irregular [x]Tachy  [ ]Aly [ ]Murmur [ ]Other    GASTROINTESTINAL:  [x]Soft  [ ]Distended   [x]+BS  [ ]Non tender [ ]Tender  [ ]PEG [ ]OGT/ NGT   Last BM:    GENITOURINARY:  [ ]Normal [ ] Incontinent   [ ]Oliguria/Anuria   [ ]Waller    MUSCULOSKELETAL:   [x]Normal   [ ]Weakness  [ ]Bed/Wheelchair bound [ ]Edema    NEUROLOGIC:   [x]No focal deficits  [ ] Cognitive impairment  [ ] Dysphagia [ ]Dysarthria [ ] Paresis [ ]Other     SKIN:   [ ]Normal   [ ]Pressure ulcer(s)  [ ]Rash [x] Fungating R Breast mass    CRITICAL CARE:  [ ] Shock Present  [ ] Septic [ ] Cardiogenic [ ] Neurologic [ ] Hypovolemic [ ] Vasopressors [ ] Inotropes   [ ] Respiratory failure present  [ ] Acute [ ] Chronic [ ] Hypoxic [ ] Hypercarbic [ ] Other  [ ] Other organ failure     LABS:             7.6    14.90 )-----------( 396      ( 05 Feb 2019 07:00 )             25.5     140  |  103  |  6<L>  ----------------------------<  65<L>  4.2   |  25  |  0.45<L>    Ca    8.6      05 Feb 2019 07:00  Phos  2.7     02-05  Mg     1.8     02-05    TPro  6.0  /  Alb  1.9<L>  /  TBili  0.5  /  DBili  x   /  AST  53<H>  /  ALT  7   /  AlkPhos  278<H>  02-04    RADIOLOGY & ADDITIONAL STUDIES:    Protein Calorie Malnutrition Present: [ ] yes [ ] no  [ ] PPSV2 < or = 30%  [ ] significant weight loss [ ] poor nutritional intake [ ] anasarca [ ] catabolic state Albumin, Serum: 1.9 g/dL (02-04-19 @ 07:15)  Artificial Nutrition [ ]     REFERRALS:   [ ]Chaplaincy  [ ] Hospice  [ ]Child Life  [ ]Social Work  [ ]Case management [ ]Holistic Therapy   Goals of Care Document:     Anselmo Lee, PGY-3 Pager#[phone]  ***To Be Reviewed with Attending

## 2019-02-05 NOTE — GOALS OF CARE CONVERSATION - PERSONAL ADVANCE DIRECTIVE - CONVERSATION DETAILS
Extensive discussion with daughter and HCP Isabel Puente ([phone]) and patient. Extensive discussion with daughter and HCP Isabel Puente ([phone]) and patient. The patient understood she has a new diagnosis of breast cancer but wants to do treatment but wanted more details. She wants to talk to surgical oncology and what are the risk and benefits of doing the surgery and whether they would consider pursuing the surgery. When asked about medical oncology she was more hesitant and did not want to talk to them yet. When asked further about GOC and further interventions she initially said "I don't know" and "I want to honor the word of God." Explained about CPR, chest compressions, intubation and she wants to still think about it. Her  had chest compressions and passed away afterwards. Agreeable to MRI and to talk further with her daughter and  Adriano Galan

## 2019-02-05 NOTE — PROGRESS NOTE ADULT - PROBLEM SELECTOR PLAN 5
Likely anemia of chronic disease given Iron Studies, ferritin, Vit B12, Folate, LDH, Haptoglobin, Retic count  -s/p 1u pRBC, Hg improved to 7.4  -monitor H/H Likely anemia of chronic disease given Iron Studies, ferritin, Vit B12, Folate, LDH, Haptoglobin, Retic count  -s/p 1u pRBC, Hgb hovering around 7.5  -monitor H/H

## 2019-02-05 NOTE — PROGRESS NOTE ADULT - PROBLEM SELECTOR PLAN 4
- WBC 16, neutrophil predominance, , afebrile   - lactate 4, Likely secondary to necrotic metastatic breast ca   - blood cx negative   - ID thinks likely secondary to cancer and not infection - WBC stably elevated, neutrophil predominance, , afebrile   - lactate 4, Likely secondary to necrotic metastatic breast ca   - blood cx negative   - ID thinks likely secondary to cancer and not infection

## 2019-02-05 NOTE — PROGRESS NOTE ADULT - PROBLEM SELECTOR PLAN 2
Presents with R fungating breast mass, + Malodorous, + Drainage found to have likely mets to brain and lungs  -Wound care recs in place  - Surgery recommended palliative mastectomy, however patient says she needs time to think about it  -Per CTA chest, neg for PE though there is mass effect on RUL pulmonary artery. Currently breathing on RA.  -CTH showing L temporal lobe lesion with surrounding edema, neurosurg following. Will get MRI to further evaluate - pt refused last night due to late hour. will try again today. Presents with R fungating breast mass, + Malodorous, + Drainage found to have likely mets to brain and lungs  -Wound care recs in place  - Surgery recommended palliative mastectomy, patient still considering  -Per CTA chest, neg for PE though there is mass effect on RUL pulmonary artery. Currently breathing on RA.  -CTH showing L temporal lobe lesion with surrounding edema, neurosurg following. Will get MRI to further evaluate - pt refused last night due to late hour. will try again today.  - oxycodone 2.5mg q6h prn for pain (5mg too much for her)

## 2019-02-05 NOTE — PROGRESS NOTE ADULT - ATTENDING COMMENTS
Patient seen and examined.  Case discussed with house staff.  Agree with above as edited.   Patient with large fungating breast lesion, likely metastatic breast cancer with suggested mets to LNs, right adrenal and suspicion for intracranial mets on CT.   Plan for MRI brain to evaluate mets and edema. Patient declined yesterday due to wanting to sleep but agreeable today.  Neurosurg c/s appreciated. Palliative care c/s appreciated. Ongoing GOC discussions.  Pain of neoplasm - started prn oxycodone - improved pain but patient was lethargic. Cut dose in half.

## 2019-02-05 NOTE — CHART NOTE - NSCHARTNOTEFT_GEN_A_CORE
Pt refused MRI due to late hour and she wanted to sleep. Will try again during the daytime.     John Hager, [employer]  PGY-1, [phone], [phone]

## 2019-02-05 NOTE — PROGRESS NOTE ADULT - ASSESSMENT
67yo Woman w/ Fungating R Breast Mass presenting with SOB found to have symptomatic anemia in the setting of likely malignancy with potential metastatic disease to lungs/brain.

## 2019-02-05 NOTE — PROGRESS NOTE ADULT - PROBLEM SELECTOR PLAN 2
Highly concerning for malignancy with potential spread of disease to lungs/LN/brain but no definitive tissue biposy at this time.  -ensure Dakins solution is no longer being used as patient states her only pain is from wound care  -patient seems to be leaning towards further workup at this time, will followup this afternoon when daughter arrives re: biopsy or mastectomy

## 2019-02-05 NOTE — PROGRESS NOTE ADULT - PROBLEM SELECTOR PLAN 3
Significant cachexia and poor intake in the setting of likely extensive malignancy.  -liberalize diet where appropriate  -encourage PO intake  -Ensure supplements

## 2019-02-05 NOTE — PROGRESS NOTE ADULT - PROBLEM SELECTOR PLAN 1
Likely multifactorial from restriction due to burdensome breast mass as well as associated lung disease.  -c/w Oxy IR 2.5mg q6h as patient felt to sedated with Oxy 5mg

## 2019-02-05 NOTE — PROGRESS NOTE ADULT - PROBLEM SELECTOR PLAN 1
Patient wants daughter (Sandy) and co- (Adriano Galan) to be her HCP  -Full code. Has capacity. Patient is  in [place], has strong mariola that God will heal her  - now agreeing to speak with palliative care, who saw her yesterday. Pt remains non-committal to biopsy / treatment. If pt prefers not to proceed with therapy, palliative recommends hospice evaluation.   - currently agrees to blood transfusions.   - also undecided about palliative surgery offered by surg onc  - will need to set up family meeting Patient wants daughter (Sandy) and co- (Adriano Adama) to be her HCP  -Full code. Has capacity. Patient is  in [employer], has strong mariola that God will heal her  - Palliative care consulted. Pt considering treatment options but still undecided. If pt prefers not to proceed with therapy, palliative recommends hospice evaluation.   - currently agrees to blood transfusions.   - palliative surgery offered by surg onc  - possible family meeting this afternoon w/ daughter & Co-

## 2019-02-05 NOTE — PROGRESS NOTE ADULT - PROBLEM SELECTOR PLAN 4
Discussed patient's understanding of her medical situation. She appreciates the concern for "tumors" that likely spread from her breast. She states she wants to pursue treatments that will "save her life."  -would revisit palliative mastectomy for both symptom relief and tissue diagnosis  -patient deferring further conversation until daughter's return this afternoon  -will follow for ongoing symptom management and GOC

## 2019-02-05 NOTE — PROGRESS NOTE ADULT - ATTENDING COMMENTS
Pt seen with resident.  Agree with above.  Goal is for surgery and treatment at this time as per pt.  Will discuss with family when they come in to hospital.  Overall prognosis still remains poor. Encourage oxy ir prn pain/ dyspnea

## 2019-02-05 NOTE — PROGRESS NOTE ADULT - SUBJECTIVE AND OBJECTIVE BOX
PEREZ ZULYDENA 68y [id number]    Patient is a 68y old  Female who presents with a chief complaint of Symptomatic anemia, Rt Fungating Breast mass , (05 Feb 2019 07:34)      Follow Up/CC:  ID following for leukocytosis    Interval History/ROS: feels ok, no fever, pain controlled     Allergies    No Known Allergies    Intolerances        ANTIMICROBIALS:  metroNIDAZOLE    Tablet 1000 daily      MEDICATIONS  (STANDING):  amLODIPine   Tablet 5 milliGRAM(s) Oral daily  enoxaparin Injectable 40 milliGRAM(s) SubCutaneous daily  influenza   Vaccine 0.5 milliLiter(s) IntraMuscular once  lactobacillus acidophilus 1 Tablet(s) Oral every 12 hours  metroNIDAZOLE    Tablet 1000 milliGRAM(s) Oral daily  sodium chloride 0.9%. 1000 milliLiter(s) (100 mL/Hr) IV Continuous <Continuous>    MEDICATIONS  (PRN):  acetaminophen   Tablet .. 650 milliGRAM(s) Oral every 6 hours PRN Mild Pain (1 - 3), Moderate Pain (4 - 6)  oxyCODONE    IR 2.5 milliGRAM(s) Oral every 6 hours PRN Severe Pain (7 - 10)        Vital Signs Last 24 Hrs  T(C): 36.9 (05 Feb 2019 05:11), Max: 37.2 (04 Feb 2019 14:15)  T(F): 98.4 (05 Feb 2019 05:11), Max: 98.9 (04 Feb 2019 14:15)  HR: 106 (05 Feb 2019 05:11) (106 - 110)  BP: 155/89 (05 Feb 2019 05:11) (152/98 - 159/105)  BP(mean): --  RR: 18 (05 Feb 2019 05:11) (18 - 20)  SpO2: 96% (05 Feb 2019 05:11) (96% - 98%)    CBC Full  -  ( 05 Feb 2019 07:00 )  WBC Count : 14.90 K/uL  Hemoglobin : 7.6 g/dL  Hematocrit : 25.5 %  Platelet Count - Automated : 396 K/uL  Mean Cell Volume : 94.4 fL  Mean Cell Hemoglobin : 28.1 pg  Mean Cell Hemoglobin Concentration : 29.8 %  Auto Neutrophil # : x  Auto Lymphocyte # : x  Auto Monocyte # : x  Auto Eosinophil # : x  Auto Basophil # : x  Auto Neutrophil % : x  Auto Lymphocyte % : x  Auto Monocyte % : x  Auto Eosinophil % : x  Auto Basophil % : x    02-05    140  |  103  |  6<L>  ----------------------------<  65<L>  4.2   |  25  |  0.45<L>    Ca    8.6      05 Feb 2019 07:00  Phos  2.7     02-05  Mg     1.8     02-05    TPro  6.0  /  Alb  1.9<L>  /  TBili  0.5  /  DBili  x   /  AST  53<H>  /  ALT  7   /  AlkPhos  278<H>  02-04    LIVER FUNCTIONS - ( 04 Feb 2019 07:15 )  Alb: 1.9 g/dL / Pro: 6.0 g/dL / ALK PHOS: 278 u/L / ALT: 7 u/L / AST: 53 u/L / GGT: x               MICROBIOLOGY:  URINE MIDSTREAM  02-01-19 --  --  Escherichia coli      BLOOD PERIPHERAL  02-01-19 --  --  --      RADIOLOGY    < from: Xray Hip w/ Pelvis 2 Views, Bilateral (02.04.19 @ 10:57) >  No suspicious lytic or blastic lesions.     No fractures dislocations or impending pathologic fractures.     Intact pelvic and obturator rings and symmetrically aligned and spaced SI   joints and pubic symphysis.     Lower lumbar spine degenerative change again apparent. Preserved   bilateral hip joint spaces.    Chronic mild enthesopathic change again noted along both peripheral iliac   crest and lateral greater trochanter margins.    No gross radiographic evidence for AVN.    < end of copied text >

## 2019-02-05 NOTE — PROGRESS NOTE ADULT - PROBLEM SELECTOR PLAN 9
DVT PPx: lovenox 40mg QD   Diet: DASH diet  Dispo: pending workup of breast mass and palliative consult DVT PPx: lovenox 40mg QD   Diet: DASH diet  Dispo: pending workup of breast mass and GOC

## 2019-02-06 DIAGNOSIS — C79.31 SECONDARY MALIGNANT NEOPLASM OF BRAIN: ICD-10-CM

## 2019-02-06 LAB
ANION GAP SERPL CALC-SCNC: 12 MMO/L — SIGNIFICANT CHANGE UP (ref 7–14)
BACTERIA BLD CULT: SIGNIFICANT CHANGE UP
BACTERIA BLD CULT: SIGNIFICANT CHANGE UP
BUN SERPL-MCNC: 7 MG/DL — SIGNIFICANT CHANGE UP (ref 7–23)
CALCIUM SERPL-MCNC: 8.6 MG/DL — SIGNIFICANT CHANGE UP (ref 8.4–10.5)
CHLORIDE SERPL-SCNC: 101 MMOL/L — SIGNIFICANT CHANGE UP (ref 98–107)
CO2 SERPL-SCNC: 25 MMOL/L — SIGNIFICANT CHANGE UP (ref 22–31)
CREAT SERPL-MCNC: 0.39 MG/DL — LOW (ref 0.5–1.3)
GLUCOSE SERPL-MCNC: 69 MG/DL — LOW (ref 70–99)
HCT VFR BLD CALC: 24.1 % — LOW (ref 34.5–45)
HGB BLD-MCNC: 7.5 G/DL — LOW (ref 11.5–15.5)
MAGNESIUM SERPL-MCNC: 1.8 MG/DL — SIGNIFICANT CHANGE UP (ref 1.6–2.6)
MCHC RBC-ENTMCNC: 28.6 PG — SIGNIFICANT CHANGE UP (ref 27–34)
MCHC RBC-ENTMCNC: 31.1 % — LOW (ref 32–36)
MCV RBC AUTO: 92 FL — SIGNIFICANT CHANGE UP (ref 80–100)
NRBC # FLD: 0.04 K/UL — LOW (ref 25–125)
PHOSPHATE SERPL-MCNC: 3.1 MG/DL — SIGNIFICANT CHANGE UP (ref 2.5–4.5)
PLATELET # BLD AUTO: 368 K/UL — SIGNIFICANT CHANGE UP (ref 150–400)
PMV BLD: 9.7 FL — SIGNIFICANT CHANGE UP (ref 7–13)
POTASSIUM SERPL-MCNC: 3.7 MMOL/L — SIGNIFICANT CHANGE UP (ref 3.5–5.3)
POTASSIUM SERPL-SCNC: 3.7 MMOL/L — SIGNIFICANT CHANGE UP (ref 3.5–5.3)
RBC # BLD: 2.62 M/UL — LOW (ref 3.8–5.2)
RBC # FLD: 19.2 % — HIGH (ref 10.3–14.5)
SODIUM SERPL-SCNC: 138 MMOL/L — SIGNIFICANT CHANGE UP (ref 135–145)
WBC # BLD: 14.4 K/UL — HIGH (ref 3.8–10.5)
WBC # FLD AUTO: 14.4 K/UL — HIGH (ref 3.8–10.5)

## 2019-02-06 PROCEDURE — 99222 1ST HOSP IP/OBS MODERATE 55: CPT

## 2019-02-06 PROCEDURE — ZZZZZ: CPT

## 2019-02-06 PROCEDURE — 99233 SBSQ HOSP IP/OBS HIGH 50: CPT | Mod: GC

## 2019-02-06 PROCEDURE — 99233 SBSQ HOSP IP/OBS HIGH 50: CPT

## 2019-02-06 RX ORDER — ENOXAPARIN SODIUM 100 MG/ML
40 INJECTION SUBCUTANEOUS DAILY
Qty: 0 | Refills: 0 | Status: DISCONTINUED | OUTPATIENT
Start: 2019-02-06 | End: 2019-02-16

## 2019-02-06 RX ORDER — LEVETIRACETAM 250 MG/1
500 TABLET, FILM COATED ORAL
Qty: 0 | Refills: 0 | Status: DISCONTINUED | OUTPATIENT
Start: 2019-02-06 | End: 2019-02-08

## 2019-02-06 RX ORDER — LEVETIRACETAM 250 MG/1
500 TABLET, FILM COATED ORAL
Qty: 0 | Refills: 0 | Status: DISCONTINUED | OUTPATIENT
Start: 2019-02-06 | End: 2019-02-06

## 2019-02-06 RX ORDER — DEXAMETHASONE 0.5 MG/5ML
4 ELIXIR ORAL EVERY 6 HOURS
Qty: 0 | Refills: 0 | Status: DISCONTINUED | OUTPATIENT
Start: 2019-02-06 | End: 2019-02-11

## 2019-02-06 RX ADMIN — Medication 1000 MILLIGRAM(S): at 15:11

## 2019-02-06 RX ADMIN — Medication 1 TABLET(S): at 17:59

## 2019-02-06 RX ADMIN — Medication 1 TABLET(S): at 05:59

## 2019-02-06 RX ADMIN — OXYCODONE HYDROCHLORIDE 2.5 MILLIGRAM(S): 5 TABLET ORAL at 15:10

## 2019-02-06 RX ADMIN — AMLODIPINE BESYLATE 5 MILLIGRAM(S): 2.5 TABLET ORAL at 05:59

## 2019-02-06 RX ADMIN — Medication 4 MILLIGRAM(S): at 23:40

## 2019-02-06 RX ADMIN — OXYCODONE HYDROCHLORIDE 2.5 MILLIGRAM(S): 5 TABLET ORAL at 15:45

## 2019-02-06 RX ADMIN — LEVETIRACETAM 500 MILLIGRAM(S): 250 TABLET, FILM COATED ORAL at 17:59

## 2019-02-06 RX ADMIN — Medication 4 MILLIGRAM(S): at 17:59

## 2019-02-06 RX ADMIN — ENOXAPARIN SODIUM 40 MILLIGRAM(S): 100 INJECTION SUBCUTANEOUS at 15:11

## 2019-02-06 NOTE — PROGRESS NOTE ADULT - PROBLEM SELECTOR PLAN 5
HCP form completed: Daughter (Sandy Puente), named HCP. Co- Adriano Galan named as alternate proxy. Form placed in chart, copy provided to daughter.  -if difficulty coordinating discussion between specialists and daughter, can coordinate family meeting

## 2019-02-06 NOTE — PROGRESS NOTE ADULT - PROBLEM SELECTOR PLAN 1
Patient wants daughter (Sandy) and co- (Adriano Galan) to be her HCP  -Full code. Has capacity. Patient is  in [place], has strong mariola that God will heal her  - Palliative care on board  - Had GOC conversation 2/5 (see GOC note). Overall pt seems to want treatment and would like to re-visit discussion w/ surg oncology regarding palliative mastectomy. Ambivalent about speaking with medical oncology. If pt prefers not to proceed with therapy, palliative recommends hospice evaluation.   - currently agrees to blood transfusions.   - palliative surgery offered by surg onc - will reach out to them again today to speak w/ patient  - will continue to have ongoing GOC discussions w/ pt & HCPs

## 2019-02-06 NOTE — PROGRESS NOTE ADULT - SUBJECTIVE AND OBJECTIVE BOX
MRI results reviewed with attending neurosurgeon. No neurological surgery indicated, patient should be seen by radiation oncology and general oncology if she wishes to pursue treatment. Continue care per primary team     < from: MR Head w/wo IV Cont (02.05.19 @ 19:34) >  MRI of the brain was performed using sagittal T1, axial T1 T2 T2 FLAIR   diffusion and susceptibility weighted sequence. The patient was injected   with approximately 5 cc of Gadavist IV with 2.5 cc of contrast discarded.   Sagittal coronal and axial T1-weighted sequences were performed.    Numerous enhancing lesions are seen throughout the posterior fossa and   supratentorial region. Approximately 4 lesions are seen in the posterior   fossa and 11 lesions are seen involving the supratentorial region. The   posterior fossa lesions are seen in the bilateral cerebellar region. The   largestof these lesions are seen involving the inferior lateral aspect   of the right cerebellum and measures approximately 2.0 x 1.0 cm. This   lesion is contiguous with a second lesion. A large peripherally enhancing   lesion with surrounding edema is seen involving the left occipital   cortical and subcortical region. This lesion measures approximately 1.3 x   1.3 cm. Other supratentorial lesions are seen involving the right middle   cranial fossa, medial to the atrium of the left lateral ventricle, dural   based lesion in the medial left occipital lobe, inferior left frontal,   right posterior frontal subcortical, left posterior frontal subcortical,   anterior interhemispheric, superior right posterior frontal, superior   medial right posterior frontal, supratentorial left posterior frontal and   right parietal region.    Heterogeneous signal is seen throughout the calvarium clivus and   visualized cervical spine which is likely compatible with an osseous   metastasis.    Prominent extra calvarial soft tissue lesion is seen involving the left   vertex region. This demonstrates abnormal enhancement and is likely   compatible with an underlying metastasis as well.    Parenchymal volume loss and chronic microvascular ischemic changes are   identified    There is no evidence acute hemorrhage in posterior fossa or supratentorial    Evaluation of the diffusion weighted sequence demonstrates no abnormal   areas of restricted diffusion to suggest acute infarct.    Aside from the left occipital region the rest of the dura demonstrates   smooth enhancement which could be secondary to prior procedure such as   lumbar puncture. Please correlate clinically.    The large vessels and trait normal flow voids    Mild inflammatory change seen involving left mastoid.    The paranasal sinuses appear clear.    Impression: Parenchymal and dural based metastasis are identified as   described above. Osseous metastasis are also seen is well as an   extracalvarial lesion involving left vertex.

## 2019-02-06 NOTE — PROGRESS NOTE ADULT - PROBLEM SELECTOR PLAN 1
Likely multifactorial from restriction due to burdensome breast mass as well as associated lung disease.  -c/w Oxy IR 2.5mg q6h, encouraged patient to use for improved symptom control  -would benefit from palliative mastectomy

## 2019-02-06 NOTE — PROGRESS NOTE ADULT - PROBLEM SELECTOR PLAN 3
Pansensitive E coli UTI  s/p ceftriaxone - secondary to metastatic breast cancer  - MRI head with >15 metastatic lesions within the brain. Neurosurgery notified -> will start Keppra 500mg BID & Decadron 4mg q6h. Will also reconsider DVT PPx given pt now w/ increased risk of ICH.  - to be evaluated by radiation oncology - secondary to metastatic breast cancer  - MRI head with >15 metastatic lesions within the brain. Neurosurgery notified -> will start Keppra 500mg BID & Decadron 4mg q6h. Discussed w/ neurosurg if ok for pt to be on DVT PPx - given lesions don't appear hemorrhagic, okay to continue Lovenox.  - to be evaluated by radiation oncology

## 2019-02-06 NOTE — CHART NOTE - NSCHARTNOTEFT_GEN_A_CORE
Called by primary team to discuss biopsy and palliative mastectomy with patient. The procedures and their risks and benefits were discussed in detail with the patient and her family. They stated that they would like more time to think about the options.    Will see patient tomorrow to follow up on a decision and answer any additional questions.    NKECHI Erickson PGY 2    Surgical Oncology / D team surgery  Pager [phone]

## 2019-02-06 NOTE — CONSULT NOTE ADULT - SUBJECTIVE AND OBJECTIVE BOX
HPI:  69 y/o Female likely Stage IV wide spread metastatic breast cancer presented to the ED  with malodorous huge R fungating breast mass with SOB and weight loss (25 lbs in 2 mths) x several weeks. She has refused breast biopsy which might guide chemotherapy, also refused. Pt does not go to the doctor and denies taking any meds but endorses that "God will take care of her".   Prefers her daughter (HCP) and  guide any future decisions.     Imaging studies:   < from: CT Head No Cont (02.02.19 @ 21:18) >  IMPRESSION:   There is a 2.3 x 1.5 x 0.8 cm soft tissue lesion overlying theleft   parietal bone, with subadjacent thinning of the calvarium and   hyperintensity/mild thickening of the dura. Additionally, there is an   area of hyperintensity involving the left occipital lobe with adjacent   edema. In patient with history of breast cancer, MRI the brain is   strongly suggested for improved assessment.    < from: MR Head w/wo IV Cont (02.05.19 @ 19:34) >  Numerous enhancing lesions are seen throughout the posterior fossa and   supratentorial region. Approximately 4 lesions are seen in the posterior   fossa and 11 lesions are seen involving the supratentorial region. The     < from: CT Abdomen and Pelvis w/ IV Cont (02.02.19 @ 21:01) >  IMPRESSION:  No pulmonary embolism.    Right breast mass measuring up to10.9 cm represents patient's known   breast cancer. Multiple pulmonary masses and hilar/mediastinal   lymphadenopathy represent metastatic disease.    No acute intra-abdominal pathology.    Right adrenal nodule may represent metastatic disease as well.      Vitals: Tem 98.7, , /100, RR 20, 96% RA, (31 Jan 2019 22:23)      Allergies    No Known Allergies    Intolerances        ROS: [  ] Fever  [  ] Chills  [  ]Chest Pain [  ] SOB  [  ]Cough [  ] N/V  [  ] Diarrhea [  ]Constipation [  ]Other ROS:  [  ] ROS otherwise negative    PAST MEDICAL & SURGICAL HISTORY:  No pertinent past medical history  No significant past surgical history      FAMILY HISTORY:  Family history of pancreatic cancer (Father)  Family history of breast cancer in sister      MEDICATIONS  (STANDING):  amLODIPine   Tablet 5 milliGRAM(s) Oral daily  dexamethasone     Tablet 4 milliGRAM(s) Oral every 6 hours  enoxaparin Injectable 40 milliGRAM(s) SubCutaneous daily  influenza   Vaccine 0.5 milliLiter(s) IntraMuscular once  lactobacillus acidophilus 1 Tablet(s) Oral every 12 hours  levETIRAcetam 500 milliGRAM(s) Oral two times a day  metroNIDAZOLE    Tablet 1000 milliGRAM(s) Oral daily  sodium chloride 0.9%. 1000 milliLiter(s) (100 mL/Hr) IV Continuous <Continuous>    MEDICATIONS  (PRN):  acetaminophen   Tablet .. 650 milliGRAM(s) Oral every 6 hours PRN Mild Pain (1 - 3), Moderate Pain (4 - 6)  oxyCODONE    IR 2.5 milliGRAM(s) Oral every 6 hours PRN Severe Pain (7 - 10)      PHYSICAL EXAM  Vital Signs Last 24 Hrs  T(C): 37.3 (06 Feb 2019 05:30), Max: 37.3 (06 Feb 2019 05:30)  T(F): 99.1 (06 Feb 2019 05:30), Max: 99.1 (06 Feb 2019 05:30)  HR: 102 (06 Feb 2019 05:30) (102 - 112)  BP: 144/95 (06 Feb 2019 05:30) (137/88 - 145/84)  BP(mean): --  RR: 18 (06 Feb 2019 05:30) (18 - 18)  SpO2: 95% (06 Feb 2019 05:30) (95% - 96%)    General: Well nourished, well developed, no acute distress  HEENT: NC/AT; EOMI, PERRL, sclera nonicteric; external ears normal; no rhinorrhea or epistaxis; mucous membranes moist; oropharynx clear and without erythema  CV: NR, RR; no appreciable r/m/g  Lungs: CTAB, no increased work of breathing  Abodmen: Bowel sounds present; soft, NTND  MSK: Vertebral spine non-tender to palpation  Neuro: AAOx3; cranial nerves II-XII intact; strength 5/5 in upper and lower extremities; sensation to light touch in tact bilaterally.  Psych: Full affect; mood congruent  Skin: no visible rashes on limited examination          ASSESSMENT/PLAN    TOMEKA PEREZ is a 68y woman with A right fungating breast mass likely presenting as wide spread Stage IV metastatic breast cancer based on imaging above with > 10 metastatic brain lesions noted.  She has yet to have a biopsy or any pathology confirmed  which we required prior to any radiation treatment being offered.  Imaging shows a breast mass, lung lesions, possible adrenal metastasis along with innumerable brain metastases.     She refuses any treatments without her daughter or  present.      While she may be a candidate for whole brain radiation, we cannot yet gain consent due to lack of pathology along with her olinda refusal at this time.     [phone] HPI:  67 y/o Female likely Stage IV wide spread metastatic breast cancer presented to the ED  with malodorous huge R fungating breast mass with SOB and weight loss (25 lbs in 2 mths) x several weeks. She has refused breast biopsy which might guide chemotherapy, also refused. Pt does not go to the doctor and denies taking any meds but endorses that "God will take care of her".   Prefers her daughter (HCP) and  guide any future decisions.     Imaging studies:   < from: CT Head No Cont (02.02.19 @ 21:18) >  IMPRESSION:   There is a 2.3 x 1.5 x 0.8 cm soft tissue lesion overlying theleft   parietal bone, with subadjacent thinning of the calvarium and   hyperintensity/mild thickening of the dura. Additionally, there is an   area of hyperintensity involving the left occipital lobe with adjacent   edema. In patient with history of breast cancer, MRI the brain is   strongly suggested for improved assessment.    < from: MR Head w/wo IV Cont (02.05.19 @ 19:34) >  Numerous enhancing lesions are seen throughout the posterior fossa and   supratentorial region. Approximately 4 lesions are seen in the posterior   fossa and 11 lesions are seen involving the supratentorial region. The     < from: CT Abdomen and Pelvis w/ IV Cont (02.02.19 @ 21:01) >  IMPRESSION:  No pulmonary embolism.    Right breast mass measuring up to10.9 cm represents patient's known   breast cancer. Multiple pulmonary masses and hilar/mediastinal   lymphadenopathy represent metastatic disease.    No acute intra-abdominal pathology.    Right adrenal nodule may represent metastatic disease as well.      Vitals: Tem 98.7, , /100, RR 20, 96% RA, (31 Jan 2019 22:23)      Allergies    No Known Allergies    Intolerances        ROS: [  ] Fever  [  ] Chills  [  ]Chest Pain [  ] SOB  [  ]Cough [  ] N/V  [  ] Diarrhea [  ]Constipation [  ]Other ROS:  [  ] ROS otherwise negative    PAST MEDICAL & SURGICAL HISTORY:  No pertinent past medical history  No significant past surgical history      FAMILY HISTORY:  Family history of pancreatic cancer (Father)  Family history of breast cancer in sister      MEDICATIONS  (STANDING):  amLODIPine   Tablet 5 milliGRAM(s) Oral daily  dexamethasone     Tablet 4 milliGRAM(s) Oral every 6 hours  enoxaparin Injectable 40 milliGRAM(s) SubCutaneous daily  influenza   Vaccine 0.5 milliLiter(s) IntraMuscular once  lactobacillus acidophilus 1 Tablet(s) Oral every 12 hours  levETIRAcetam 500 milliGRAM(s) Oral two times a day  metroNIDAZOLE    Tablet 1000 milliGRAM(s) Oral daily  sodium chloride 0.9%. 1000 milliLiter(s) (100 mL/Hr) IV Continuous <Continuous>    MEDICATIONS  (PRN):  acetaminophen   Tablet .. 650 milliGRAM(s) Oral every 6 hours PRN Mild Pain (1 - 3), Moderate Pain (4 - 6)  oxyCODONE    IR 2.5 milliGRAM(s) Oral every 6 hours PRN Severe Pain (7 - 10)      PHYSICAL EXAM  Vital Signs Last 24 Hrs  T(C): 37.3 (06 Feb 2019 05:30), Max: 37.3 (06 Feb 2019 05:30)  T(F): 99.1 (06 Feb 2019 05:30), Max: 99.1 (06 Feb 2019 05:30)  HR: 102 (06 Feb 2019 05:30) (102 - 112)  BP: 144/95 (06 Feb 2019 05:30) (137/88 - 145/84)  BP(mean): --  RR: 18 (06 Feb 2019 05:30) (18 - 18)  SpO2: 95% (06 Feb 2019 05:30) (95% - 96%)    General: Well nourished, well developed, no acute distress  HEENT: NC/AT; EOMI, PERRL, sclera nonicteric; external ears normal; no rhinorrhea or epistaxis; mucous membranes moist; oropharynx clear and without erythema  CV: NR, RR; no appreciable r/m/g  Lungs: CTAB, no increased work of breathing  Abodmen: Bowel sounds present; soft, NTND  MSK: Vertebral spine non-tender to palpation  Neuro: AAOx3; cranial nerves II-XII intact; strength 5/5 in upper and lower extremities; sensation to light touch in tact bilaterally.  Psych: Full affect; mood congruent  Skin: no visible rashes on limited examination          ASSESSMENT/PLAN    TOMEKA PEREZ is a 68y woman with A right fungating breast mass likely presenting as wide spread Stage IV metastatic breast cancer based on imaging above with > 10 metastatic brain lesions noted.  She has yet to have a biopsy or any pathology confirmed  which we usually require prior to any radiation treatment being offered.  Imaging shows a breast mass, lung lesions, possible adrenal metastasis along with innumerable brain metastases.     She refuses any treatments without her daughter or  present.      While she may be a candidate for whole brain radiation, we cannot yet gain consent due to lack of pathology along with her olinda refusal at this time.     Sven Cee MD office    cell

## 2019-02-06 NOTE — PROGRESS NOTE ADULT - PROBLEM SELECTOR PLAN 5
Likely anemia of chronic disease given Iron Studies, ferritin, Vit B12, Folate, LDH, Haptoglobin, Retic count  -s/p 1u pRBC, Hgb hovering around 7.5  -monitor H/H Likely anemia of chronic disease given Iron Studies, ferritin, Vit B12, Folate, LDH, Haptoglobin, Retic count  -Hgb hovering around 7.5  -monitor H/H

## 2019-02-06 NOTE — PROGRESS NOTE ADULT - SUBJECTIVE AND OBJECTIVE BOX
***************************************************************  Micha Bear MD Pgy-1  Contact/Pager [phone] / [phone]  ***************************************************************    TOMEKA PEREZ  68y  MRN: [id number]      Patient is a 68y old  Female who presents with a chief complaint of Symptomatic anemia, Rt Fungating Breast mass , (05 Feb 2019 11:38)      Subjective/ON Events: GOC conversation held yesterday w/ HCP (daughter) Isabel Perez present. Pt interested in treatment but wants more details regarding palliative mastectomy and wishes to re-discuss w/ surgical oncology. Pt went for MRI yesterday evening, awaiting read. Otherwise no other events.      MEDICATIONS  (STANDING):  amLODIPine   Tablet 5 milliGRAM(s) Oral daily  enoxaparin Injectable 40 milliGRAM(s) SubCutaneous daily  influenza   Vaccine 0.5 milliLiter(s) IntraMuscular once  lactobacillus acidophilus 1 Tablet(s) Oral every 12 hours  metroNIDAZOLE    Tablet 1000 milliGRAM(s) Oral daily  sodium chloride 0.9%. 1000 milliLiter(s) (100 mL/Hr) IV Continuous <Continuous>    MEDICATIONS  (PRN):  acetaminophen   Tablet .. 650 milliGRAM(s) Oral every 6 hours PRN Mild Pain (1 - 3), Moderate Pain (4 - 6)  oxyCODONE    IR 2.5 milliGRAM(s) Oral every 6 hours PRN Severe Pain (7 - 10)        Objective:    Vitals: Vital Signs Last 24 Hrs  T(C): 37.3 (02-06-19 @ 05:30), Max: 37.3 (02-06-19 @ 05:30)  T(F): 99.1 (02-06-19 @ 05:30), Max: 99.1 (02-06-19 @ 05:30)  HR: 102 (02-06-19 @ 05:30) (102 - 112)  BP: 144/95 (02-06-19 @ 05:30) (137/88 - 145/84)  RR: 18 (02-06-19 @ 05:30) (18 - 18)  SpO2: 95% (02-06-19 @ 05:30) (95% - 96%)                  I&O's Summary    05 Feb 2019 07:01  -  06 Feb 2019 07:00  --------------------------------------------------------  IN: 350 mL / OUT: 0 mL / NET: 350 mL        PHYSICAL EXAM:  GENERAL: NAD  HEAD:  Atraumatic  EYES: EOMI, PERRLA, conjunctiva and sclera clear  NECK: Supple, No JVD  CHEST/LUNG: Clear to auscultation bilaterally, poor inspiratory effort; No wheeze, on RA.  HEART: Regular rate and rhythm; No murmurs, rubs, or gallops  ABDOMEN: Soft, Nontender, Nondistended; Bowel sounds present  EXTREMITIES:  2+ Peripheral Pulses, No clubbing, cyanosis, or edema  NEURO/PSYCH: AAOx3, nonfocal  SKIN: No rashes. Ulcerated fungating breast mass                                           LABS:  02-05    140  |  103  |  6<L>  ----------------------------<  65<L>  4.2   |  25  |  0.45<L>  02-04    141  |  105  |  6<L>  ----------------------------<  77  3.8   |  23  |  0.40<L>    Ca    8.6      05 Feb 2019 07:00  Ca    9.0      04 Feb 2019 07:15  Phos  2.7     02-05  Mg     1.8     02-05    TPro  6.0  /  Alb  1.9<L>  /  TBili  0.5  /  DBili  x   /  AST  53<H>  /  ALT  7   /  AlkPhos  278<H>  02-04                          7.6    14.90 )-----------( 396      ( 05 Feb 2019 07:00 )             25.5                         7.7    16.52 )-----------( 406      ( 04 Feb 2019 07:15 )             25.2 ***************************************************************  Micha Bear MD Pgy-1  Contact/Pager [phone] / [phone]  ***************************************************************    TOMEKA PEREZ  68y  MRN: [id number]      Patient is a 68y old  Female who presents with a chief complaint of Symptomatic anemia, Rt Fungating Breast mass , (05 Feb 2019 11:38)      Subjective/ON Events: Reports 2.5mg dose of oxycodone worked well for her (improved her pain but didn't make her too dizzy). GOC conversation held yesterday w/ HCP (daughter) Isabel Perez present. Pt interested in treatment but wants more details regarding palliative mastectomy and wishes to re-discuss w/ surgical oncology. Pt went for MRI head yesterday evening, which demonstrates at least 15 metastatic lesions within the brain. Otherwise no other events.      MEDICATIONS  (STANDING):  amLODIPine   Tablet 5 milliGRAM(s) Oral daily  enoxaparin Injectable 40 milliGRAM(s) SubCutaneous daily  influenza   Vaccine 0.5 milliLiter(s) IntraMuscular once  lactobacillus acidophilus 1 Tablet(s) Oral every 12 hours  metroNIDAZOLE    Tablet 1000 milliGRAM(s) Oral daily  sodium chloride 0.9%. 1000 milliLiter(s) (100 mL/Hr) IV Continuous <Continuous>    MEDICATIONS  (PRN):  acetaminophen   Tablet .. 650 milliGRAM(s) Oral every 6 hours PRN Mild Pain (1 - 3), Moderate Pain (4 - 6)  oxyCODONE    IR 2.5 milliGRAM(s) Oral every 6 hours PRN Severe Pain (7 - 10)        Objective:    Vitals: Vital Signs Last 24 Hrs  T(C): 37.3 (02-06-19 @ 05:30), Max: 37.3 (02-06-19 @ 05:30)  T(F): 99.1 (02-06-19 @ 05:30), Max: 99.1 (02-06-19 @ 05:30)  HR: 102 (02-06-19 @ 05:30) (102 - 112)  BP: 144/95 (02-06-19 @ 05:30) (137/88 - 145/84)  RR: 18 (02-06-19 @ 05:30) (18 - 18)  SpO2: 95% (02-06-19 @ 05:30) (95% - 96%)                  I&O's Summary    05 Feb 2019 07:01  -  06 Feb 2019 07:00  --------------------------------------------------------  IN: 350 mL / OUT: 0 mL / NET: 350 mL        PHYSICAL EXAM:  GENERAL: NAD  HEAD:  Atraumatic  EYES: EOMI, PERRLA, conjunctiva and sclera clear  NECK: Supple, No JVD  CHEST/LUNG: Clear to auscultation bilaterally, poor inspiratory effort; No wheeze, on RA.  HEART: Regular rate and rhythm; No murmurs, rubs, or gallops  ABDOMEN: Soft, Nontender, Nondistended; Bowel sounds present  EXTREMITIES:  2+ Peripheral Pulses, No clubbing, cyanosis, or edema  NEURO/PSYCH: AAOx3, nonfocal  SKIN: No rashes. Ulcerated fungating breast mass                                           LABS:  02-05    140  |  103  |  6<L>  ----------------------------<  65<L>  4.2   |  25  |  0.45<L>  02-04    141  |  105  |  6<L>  ----------------------------<  77  3.8   |  23  |  0.40<L>    Ca    8.6      05 Feb 2019 07:00  Ca    9.0      04 Feb 2019 07:15  Phos  2.7     02-05  Mg     1.8     02-05    TPro  6.0  /  Alb  1.9<L>  /  TBili  0.5  /  DBili  x   /  AST  53<H>  /  ALT  7   /  AlkPhos  278<H>  02-04                          7.6    14.90 )-----------( 396      ( 05 Feb 2019 07:00 )             25.5                         7.7    16.52 )-----------( 406      ( 04 Feb 2019 07:15 )             25.2

## 2019-02-06 NOTE — PROGRESS NOTE ADULT - SUBJECTIVE AND OBJECTIVE BOX
SUBJECTIVE AND OBJECTIVE  INTERVAL HPI/OVERNIGHT EVENTS:    DNR on chart:     Allergies    No Known Allergies    Intolerances    MEDICATIONS  (STANDING):  amLODIPine   Tablet 5 milliGRAM(s) Oral daily  dexamethasone     Tablet 4 milliGRAM(s) Oral every 6 hours  enoxaparin Injectable 40 milliGRAM(s) SubCutaneous daily  influenza   Vaccine 0.5 milliLiter(s) IntraMuscular once  lactobacillus acidophilus 1 Tablet(s) Oral every 12 hours  levETIRAcetam 500 milliGRAM(s) Oral two times a day  metroNIDAZOLE    Tablet 1000 milliGRAM(s) Oral daily  sodium chloride 0.9%. 1000 milliLiter(s) (100 mL/Hr) IV Continuous <Continuous>    MEDICATIONS  (PRN):  acetaminophen   Tablet .. 650 milliGRAM(s) Oral every 6 hours PRN Mild Pain (1 - 3), Moderate Pain (4 - 6)  oxyCODONE    IR 2.5 milliGRAM(s) Oral every 6 hours PRN Severe Pain (7 - 10)      ITEMS UNCHECKED ARE NOT PRESENT    PRESENT SYMPTOMS: [ ]Unable to obtain due to poor mentation   Source if other than patient:  [ ]Family   [ ]Team     Pain (Impact on QOL):    Location:  Minimal acceptable level (0-10 scale):            Aggravating factors:  Quality:  Radiation:  Severity (0-10 scale):    Timing:    Dyspnea:                           [ ]Mild [ ]Moderate [ ]Severe  Anxiety:                             [ ]Mild [ ]Moderate [ ]Severe  Fatigue:                             [ ]Mild [ ]Moderate [ ]Severe  Nausea:                             [ ]Mild [ ]Moderate [ ]Severe  Loss of appetite:              [ ]Mild [ ]Moderate [ ]Severe  Constipation:                    [ ]Mild [ ]Moderate [ ]Severe    PAIN AD Score:	  http://geriatrictoolkit.[email or web address]/cog/painad.pdf (Ctrl + left click to view)    Other Symptoms:  [ ]All other review of systems negative     Karnofsky Performance Score/Palliative Performance Status Version 2:         %    http://palliative.info/resource_material/PPSv2.pdf    PHYSICAL EXAM:  Vital Signs Last 24 Hrs  T(C): 37.3 (06 Feb 2019 05:30), Max: 37.3 (06 Feb 2019 05:30)  T(F): 99.1 (06 Feb 2019 05:30), Max: 99.1 (06 Feb 2019 05:30)  HR: 102 (06 Feb 2019 05:30) (102 - 112)  BP: 144/95 (06 Feb 2019 05:30) (137/88 - 145/84)  BP(mean): --  RR: 18 (06 Feb 2019 05:30) (18 - 18)  SpO2: 95% (06 Feb 2019 05:30) (95% - 96%) I&O's Summary    05 Feb 2019 07:01  -  06 Feb 2019 07:00  --------------------------------------------------------  IN: 350 mL / OUT: 0 mL / NET: 350 mL     GENERAL:  [ ]Alert  [ ]Oriented x   [ ]Lethargic  [ ]Cachexia  [ ]Unarousable  [ ]Verbal  [ ]Non-Verbal    Behavioral:   [ ] Anxiety  [ ] Delirium [ ] Agitation [ ] Other    HEENT:  [ ]Normal   [ ]Dry mouth   [ ]ET Tube/Trach  [ ]Oral lesions    PULMONARY:   [ ]Clear [ ]Tachypnea  [ ]Audible excessive secretions   [ ]Rhonchi        [ ]Right [ ]Left [ ]Bilateral  [ ]Crackles        [ ]Right [ ]Left [ ]Bilateral  [ ]Wheezing     [ ]Right [ ]Left [ ]Bilateral    CARDIOVASCULAR:    [ ]Regular [ ]Irregular [ ]Tachy  [ ]Aly [ ]Murmur [ ]Other    GASTROINTESTINAL:  [ ]Soft  [ ]Distended   [ ]+BS  [ ]Non tender [ ]Tender  [ ]PEG [ ]OGT/ NGT   Last BM:    GENITOURINARY:  [ ]Normal [ ] Incontinent   [ ]Oliguria/Anuria   [ ]Waller    MUSCULOSKELETAL:   [ ]Normal   [ ]Weakness  [ ]Bed/Wheelchair bound [ ]Edema    NEUROLOGIC:   [ ]No focal deficits  [ ] Cognitive impairment  [ ] Dysphagia [ ]Dysarthria [ ] Paresis [ ]Other     SKIN:   [ ]Normal   [ ]Pressure ulcer(s)  [ ]Rash    CRITICAL CARE:  [ ] Shock Present  [ ] Septic [ ] Cardiogenic [ ] Neurologic [ ] Hypovolemic [ ] Vasopressors [ ] Inotropes   [ ] Respiratory failure present  [ ] Acute [ ] Chronic [ ] Hypoxic [ ] Hypercarbic [ ] Other  [ ] Other organ failure     LABS:                        7.5    14.40 )-----------( 368      ( 06 Feb 2019 06:30 )             24.1   02-06    138  |  101  |  7   ----------------------------<  69<L>  3.7   |  25  |  0.39<L>    Ca    8.6      06 Feb 2019 06:30  Phos  3.1     02-06  Mg     1.8     02-06          RADIOLOGY & ADDITIONAL STUDIES:    Protein Calorie Malnutrition Present: [ ] yes [ ] no  [ ] PPSV2 < or = 30%  [ ] significant weight loss [ ] poor nutritional intake [ ] anasarca [ ] catabolic state Albumin, Serum: 1.9 g/dL (02-04-19 @ 07:15)  Artificial Nutrition [ ]     REFERRALS:   [ ]Chaplaincy  [ ] Hospice  [ ]Child Life  [ ]Social Work  [ ]Case management [ ]Holistic Therapy   Goals of Care Document: Goals of Care Conversation - Personal Advance Directive [GISELA Aguirre] (02-05-19 @ 17:02)      Anselmo Lee, PGY-3 Pager#[phone]  ***To Be Reviewed with Attending SUBJECTIVE AND OBJECTIVE  INTERVAL HPI/OVERNIGHT EVENTS:  no acute events overnight. MRI performed showing many brain mets. patient states she is amenable to procedures but wants her daughter to be present before making any decisions. took an Oxy 2.5mg yesterday evening which helped with her symptoms but did not put her to sleep.    DNR on chart: X    Allergies  No Known Allergies      MEDICATIONS  (STANDING):  amLODIPine   Tablet 5 milliGRAM(s) Oral daily  dexamethasone     Tablet 4 milliGRAM(s) Oral every 6 hours  enoxaparin Injectable 40 milliGRAM(s) SubCutaneous daily  influenza   Vaccine 0.5 milliLiter(s) IntraMuscular once  lactobacillus acidophilus 1 Tablet(s) Oral every 12 hours  levETIRAcetam 500 milliGRAM(s) Oral two times a day  metroNIDAZOLE    Tablet 1000 milliGRAM(s) Oral daily  sodium chloride 0.9%. 1000 milliLiter(s) (100 mL/Hr) IV Continuous <Continuous>    MEDICATIONS  (PRN):  acetaminophen   Tablet .. 650 milliGRAM(s) Oral every 6 hours PRN Mild Pain (1 - 3), Moderate Pain (4 - 6)  oxyCODONE    IR 2.5 milliGRAM(s) Oral every 6 hours PRN Severe Pain (7 - 10)      ITEMS UNCHECKED ARE NOT PRESENT    PRESENT SYMPTOMS: [ ]Unable to obtain due to poor mentation   Source if other than patient:  [ ]Family   [ ]Team     Pain (Impact on QOL): Irritating  Location: R Breast  Minimal acceptable level (0-10 scale):            Aggravating factors: Wound Care  Quality:  Radiation:  Severity (0-10 scale):    Timing: constant after wound care, with inspiration    Dyspnea:                           [ ]Mild [x]Moderate [ ]Severe  Anxiety:                             [ ]Mild [ ]Moderate [ ]Severe  Fatigue:                             [ ]Mild [ ]Moderate [ ]Severe  Nausea:                             [ ]Mild [ ]Moderate [ ]Severe  Loss of appetite:              [ ]Mild [ ]Moderate [ ]Severe  Constipation:                    [ ]Mild [ ]Moderate [ ]Severe    PAIN AD Score:	  http://geriatrictoolkit.[employer]/cog/painad.pdf (Ctrl + left click to view)    Other Symptoms:  [ ]All other review of systems negative     Karnofsky Performance Score/Palliative Performance Status Version 2:      40%    http://palliative.info/resource_material/PPSv2.pdf    PHYSICAL EXAM:  Vital Signs Last 24 Hrs  T(C): 37.3 (06 Feb 2019 05:30), Max: 37.3 (06 Feb 2019 05:30)  T(F): 99.1 (06 Feb 2019 05:30), Max: 99.1 (06 Feb 2019 05:30)  HR: 102 (06 Feb 2019 05:30) (102 - 112)  BP: 144/95 (06 Feb 2019 05:30) (137/88 - 145/84)  BP(mean): --  RR: 18 (06 Feb 2019 05:30) (18 - 18)  SpO2: 95% (06 Feb 2019 05:30) (95% - 96%) I&O's Summary    05 Feb 2019 07:01  -  06 Feb 2019 07:00  --------------------------------------------------------  IN: 350 mL / OUT: 0 mL / NET: 350 mL    GENERAL:  [x]Alert  [x]Oriented x3   [ ]Lethargic  [ ]Cachexia  [ ]Unarousable  [x]Verbal  [ ]Non-Verbal    Behavioral:   [ ] Anxiety  [ ] Delirium [ ] Agitation [ ] Other    HEENT:  [x]Normal   [ ]Dry mouth   [ ]ET Tube/Trach  [ ]Oral lesions    PULMONARY:   [x]Clear [ ]Tachypnea  [ ]Audible excessive secretions   [ ]Rhonchi        [ ]Right [ ]Left [ ]Bilateral  [ ]Crackles        [ ]Right [ ]Left [ ]Bilateral  [ ]Wheezing     [ ]Right [ ]Left [ ]Bilateral    CARDIOVASCULAR:    [ ]Regular [ ]Irregular [x]Tachy  [ ]Aly [ ]Murmur [ ]Other    GASTROINTESTINAL:  [x]Soft  [ ]Distended   [x]+BS  [ ]Non tender [ ]Tender  [ ]PEG [ ]OGT/ NGT   Last BM:    GENITOURINARY:  [ ]Normal [ ] Incontinent   [ ]Oliguria/Anuria   [ ]Waller    MUSCULOSKELETAL:   [x]Normal   [ ]Weakness  [ ]Bed/Wheelchair bound [ ]Edema    NEUROLOGIC:   [x]No focal deficits  [ ] Cognitive impairment  [ ] Dysphagia [ ]Dysarthria [ ] Paresis [ ]Other     SKIN:   [ ]Normal   [ ]Pressure ulcer(s)  [ ]Rash [x] Fungating R Breast mass    CRITICAL CARE:  [ ] Shock Present  [ ] Septic [ ] Cardiogenic [ ] Neurologic [ ] Hypovolemic [ ] Vasopressors [ ] Inotropes   [ ] Respiratory failure present  [ ] Acute [ ] Chronic [ ] Hypoxic [ ] Hypercarbic [ ] Other  [ ] Other organ failure     LABS:                        7.5    14.40 )-----------( 368      ( 06 Feb 2019 06:30 )             24.1   02-06    138  |  101  |  7   ----------------------------<  69<L>  3.7   |  25  |  0.39<L>    Ca    8.6      06 Feb 2019 06:30  Phos  3.1     02-06  Mg     1.8     02-06    RADIOLOGY & ADDITIONAL STUDIES:    Protein Calorie Malnutrition Present: [x] yes [ ] no  [ ] PPSV2 < or = 30%  [ ] significant weight loss [x] poor nutritional intake [ ] anasarca [ ] catabolic state Albumin, Serum: 1.9 g/dL (02-04-19 @ 07:15)  Artificial Nutrition [ ]     REFERRALS:   [ ]Chaplaincy  [ ] Hospice  [ ]Child Life  [ ]Social Work  [ ]Case management [ ]Holistic Therapy   Goals of Care Document: Goals of Care Conversation - Personal Advance Directive [GISELA Aguirre] (02-05-19 @ 17:02)      Anselmo Lee, PGY-3 Pager#[phone]  ***To Be Reviewed with Attending

## 2019-02-06 NOTE — PROGRESS NOTE ADULT - ATTENDING COMMENTS
Pt seen and examined with HS on above date.  Agree with above HS note.  Plan discussed with House staff.    68 F with new diagnosis of likely metastatic breast Ca with mets to brain.  GOC conversations ongoing, rad onc/pall input appreciated.

## 2019-02-06 NOTE — PROGRESS NOTE ADULT - PROBLEM SELECTOR PLAN 9
DVT PPx: lovenox 40mg QD   Diet: DASH diet  Dispo: pending workup of breast mass and GOC DVT PPx: lovenox 40mg QD   Diet: DASH diet  Dispo: pending workup of breast mass and ongoing GOC discussions

## 2019-02-06 NOTE — PROGRESS NOTE ADULT - PROBLEM SELECTOR PLAN 4
Discussed patient's understanding of her medical situation. She appreciates the concern for "tumors" that likely spread from her breast. She states she wants to pursue treatments but needs her daughter to be present to hear her options  -would revisit palliative mastectomy for both symptom relief and tissue diagnosis  -reiterated to patient and daughter that any treatment would be palliative in nature  -will follow for ongoing symptom management and GOC

## 2019-02-06 NOTE — PROGRESS NOTE ADULT - PROBLEM SELECTOR PLAN 4
- WBC stably elevated, neutrophil predominance, , afebrile   - lactate 4, Likely secondary to necrotic metastatic breast ca   - blood cx negative   - ID thinks likely secondary to cancer and not infection

## 2019-02-06 NOTE — PROGRESS NOTE ADULT - ASSESSMENT
68yoF with poor medical care and no known PMH presents with multiple months of growing, fungating, malodorous right breast mass, fall and dizziness. Likely metastatic breast cancer. Found to be severely anemic on lab work. 68yoF with poor medical care and no known PMH presents with multiple months of growing, fungating, malodorous right breast mass, fall and dizziness. Likely metastatic breast cancer. Found to be severely anemic on lab work. MRI head 2/5 demonstrates many metastatic lesions within the brain.

## 2019-02-06 NOTE — PROGRESS NOTE ADULT - PROBLEM SELECTOR PLAN 2
Presents with R fungating breast mass, + Malodorous, + Drainage found to have likely mets to brain and lungs  -Wound care recs in place  - Surgery recommended palliative mastectomy, patient considering  -Per CTA chest, neg for PE though there is mass effect on RUL pulmonary artery. Currently breathing on RA.  -CTH showing L temporal lobe lesion with surrounding edema, neurosurg following. Had MRI last night to further evaluate. Awaiting read. F/u neurosurgery rec's.  - oxycodone 2.5mg q6h prn for pain Presents with R fungating breast mass, + Malodorous, + Drainage found to have likely mets to brain and lungs  -Wound care recs in place  - Surgery recommended palliative mastectomy, patient considering  -Per CTA chest, neg for PE though there is mass effect on RUL pulmonary artery. Currently breathing on RA.  - oxycodone 2.5mg q6h prn for pain  - mets to brain, adrenals, lung, skull

## 2019-02-06 NOTE — PROGRESS NOTE ADULT - ASSESSMENT
67yo Woman w/ Fungating R Breast Mass presenting with SOB found to have symptomatic anemia in the setting of likely malignancy with likely spread of disease to lungs and MRI showing many brain metastases.

## 2019-02-06 NOTE — PROGRESS NOTE ADULT - PROBLEM SELECTOR PLAN 2
Highly concerning for malignancy with potential spread of disease to lungs/LN/brain but no definitive tissue biopsy at this time.  -patient seems to be leaning towards further workup at this time but wants daughter present

## 2019-02-07 LAB
ANION GAP SERPL CALC-SCNC: 15 MMO/L — HIGH (ref 7–14)
BUN SERPL-MCNC: 10 MG/DL — SIGNIFICANT CHANGE UP (ref 7–23)
CALCIUM SERPL-MCNC: 8.5 MG/DL — SIGNIFICANT CHANGE UP (ref 8.4–10.5)
CHLORIDE SERPL-SCNC: 100 MMOL/L — SIGNIFICANT CHANGE UP (ref 98–107)
CO2 SERPL-SCNC: 25 MMOL/L — SIGNIFICANT CHANGE UP (ref 22–31)
CREAT SERPL-MCNC: 0.43 MG/DL — LOW (ref 0.5–1.3)
GLUCOSE SERPL-MCNC: 122 MG/DL — HIGH (ref 70–99)
MAGNESIUM SERPL-MCNC: 2 MG/DL — SIGNIFICANT CHANGE UP (ref 1.6–2.6)
PHOSPHATE SERPL-MCNC: 3.6 MG/DL — SIGNIFICANT CHANGE UP (ref 2.5–4.5)
POTASSIUM SERPL-MCNC: 3.8 MMOL/L — SIGNIFICANT CHANGE UP (ref 3.5–5.3)
POTASSIUM SERPL-SCNC: 3.8 MMOL/L — SIGNIFICANT CHANGE UP (ref 3.5–5.3)
SODIUM SERPL-SCNC: 140 MMOL/L — SIGNIFICANT CHANGE UP (ref 135–145)

## 2019-02-07 PROCEDURE — 99233 SBSQ HOSP IP/OBS HIGH 50: CPT | Mod: GC

## 2019-02-07 PROCEDURE — 93010 ELECTROCARDIOGRAM REPORT: CPT

## 2019-02-07 PROCEDURE — 99232 SBSQ HOSP IP/OBS MODERATE 35: CPT

## 2019-02-07 RX ORDER — AMLODIPINE BESYLATE 2.5 MG/1
1 TABLET ORAL
Qty: 0 | Refills: 0 | COMMUNITY
Start: 2019-02-07

## 2019-02-07 RX ORDER — LACTOBACILLUS ACIDOPHILUS 100MM CELL
1 CAPSULE ORAL
Qty: 0 | Refills: 0 | COMMUNITY
Start: 2019-02-07

## 2019-02-07 RX ORDER — DEXAMETHASONE 0.5 MG/5ML
1 ELIXIR ORAL
Qty: 0 | Refills: 0 | COMMUNITY
Start: 2019-02-07

## 2019-02-07 RX ORDER — ACETAMINOPHEN 500 MG
2 TABLET ORAL
Qty: 0 | Refills: 0 | COMMUNITY
Start: 2019-02-07

## 2019-02-07 RX ORDER — LEVETIRACETAM 250 MG/1
5 TABLET, FILM COATED ORAL
Qty: 0 | Refills: 0 | COMMUNITY
Start: 2019-02-07

## 2019-02-07 RX ORDER — OXYCODONE HYDROCHLORIDE 5 MG/1
2.5 TABLET ORAL
Qty: 0 | Refills: 0 | COMMUNITY
Start: 2019-02-07

## 2019-02-07 RX ORDER — METRONIDAZOLE 500 MG
2 TABLET ORAL
Qty: 0 | Refills: 0 | COMMUNITY
Start: 2019-02-07

## 2019-02-07 RX ADMIN — AMLODIPINE BESYLATE 5 MILLIGRAM(S): 2.5 TABLET ORAL at 06:14

## 2019-02-07 RX ADMIN — Medication 1 TABLET(S): at 17:01

## 2019-02-07 RX ADMIN — Medication 1 TABLET(S): at 06:14

## 2019-02-07 RX ADMIN — Medication 4 MILLIGRAM(S): at 17:02

## 2019-02-07 RX ADMIN — Medication 4 MILLIGRAM(S): at 11:54

## 2019-02-07 RX ADMIN — Medication 1000 MILLIGRAM(S): at 11:54

## 2019-02-07 RX ADMIN — LEVETIRACETAM 500 MILLIGRAM(S): 250 TABLET, FILM COATED ORAL at 06:17

## 2019-02-07 RX ADMIN — Medication 4 MILLIGRAM(S): at 06:14

## 2019-02-07 RX ADMIN — ENOXAPARIN SODIUM 40 MILLIGRAM(S): 100 INJECTION SUBCUTANEOUS at 11:53

## 2019-02-07 RX ADMIN — LEVETIRACETAM 500 MILLIGRAM(S): 250 TABLET, FILM COATED ORAL at 17:01

## 2019-02-07 NOTE — PROGRESS NOTE ADULT - ASSESSMENT
68yoF with poor medical care and no known PMH presents with multiple months of growing, fungating, malodorous right breast mass, fall and dizziness. Likely metastatic breast cancer. Found to be severely anemic on lab work, now more stable. MRI head 2/5 demonstrates many metastatic lesions within the brain. Pt continues to be undecided on whether or not she wants to pursue treatment and is otherwise medically stable. 68yoF with poor medical care and no known PMH presents with multiple months of growing, fungating, malodorous right breast mass, fall and dizziness. Likely metastatic breast cancer. Found to be severely anemic on lab work, now more stable. MRI head 2/5 demonstrates many metastatic lesions within the brain. Pt expressing interest in surgical intervention, seems to be leaning towards mastectomy. Otherwise medically stable.

## 2019-02-07 NOTE — PROGRESS NOTE ADULT - ATTENDING COMMENTS
Pt seen and examined with HS on above date.  Agree with above HS note.  Plan discussed with House staff.    68 F with metastatic breast ca (mets to brain, lung, adrenal, ? bone) here with fungating breast mass.  deciding on surgery option.

## 2019-02-07 NOTE — PROGRESS NOTE ADULT - ASSESSMENT
67yo Woman w/ Fungating R Breast Mass presenting with SOB found to have symptomatic anemia in the setting of likely malignancy with likely spread of disease to lungs and MRI showing many brain metastases, now amenable to intervention.

## 2019-02-07 NOTE — PROGRESS NOTE ADULT - SUBJECTIVE AND OBJECTIVE BOX
***************************************************************  Micha Bear MD Pgy-1  Contact/Pager [phone] / [phone]  ***************************************************************    TOMEKA PEREZ  68y  MRN: [id number]      Patient is a 68y old  Female who presents with a chief complaint of Symptomatic anemia, Rt Fungating Breast mass , (06 Feb 2019 12:21)      Subjective/ON Events:      MEDICATIONS  (STANDING):  amLODIPine   Tablet 5 milliGRAM(s) Oral daily  dexamethasone     Tablet 4 milliGRAM(s) Oral every 6 hours  enoxaparin Injectable 40 milliGRAM(s) SubCutaneous daily  influenza   Vaccine 0.5 milliLiter(s) IntraMuscular once  lactobacillus acidophilus 1 Tablet(s) Oral every 12 hours  levETIRAcetam  Solution 500 milliGRAM(s) Oral two times a day  metroNIDAZOLE    Tablet 1000 milliGRAM(s) Oral daily  sodium chloride 0.9%. 1000 milliLiter(s) (100 mL/Hr) IV Continuous <Continuous>    MEDICATIONS  (PRN):  acetaminophen   Tablet .. 650 milliGRAM(s) Oral every 6 hours PRN Mild Pain (1 - 3), Moderate Pain (4 - 6)  oxyCODONE    IR 2.5 milliGRAM(s) Oral every 6 hours PRN Severe Pain (7 - 10)        Objective:    Vitals: Vital Signs Last 24 Hrs  T(C): 36.9 (02-07-19 @ 05:05), Max: 37.1 (02-06-19 @ 20:36)  T(F): 98.5 (02-07-19 @ 05:05), Max: 98.8 (02-06-19 @ 20:36)  HR: 94 (02-07-19 @ 05:05) (94 - 112)  BP: 137/92 (02-07-19 @ 05:05) (137/92 - 145/76)  RR: 18 (02-07-19 @ 05:05) (18 - 18)  SpO2: 100% (02-07-19 @ 05:05) (94% - 100%)                I&O's Summary    06 Feb 2019 07:01  -  07 Feb 2019 07:00  --------------------------------------------------------  IN: 550 mL / OUT: 0 mL / NET: 550 mL      PHYSICAL EXAM:  GENERAL: NAD  HEAD:  Atraumatic  EYES: EOMI, PERRLA, conjunctiva and sclera clear  NECK: Supple, No JVD  CHEST/LUNG: Clear to auscultation bilaterally, poor inspiratory effort; No wheeze, on RA.  HEART: Regular rate and rhythm; No murmurs, rubs, or gallops  ABDOMEN: Soft, Nontender, Nondistended; Bowel sounds present  EXTREMITIES:  2+ Peripheral Pulses, No clubbing, cyanosis, or edema  NEURO/PSYCH: AAOx3, nonfocal  SKIN: No rashes. Ulcerated fungating breast mass                                        LABS:  02-06    138  |  101  |  7   ----------------------------<  69<L>  3.7   |  25  |  0.39<L>  02-05    140  |  103  |  6<L>  ----------------------------<  65<L>  4.2   |  25  |  0.45<L>  02-04    141  |  105  |  6<L>  ----------------------------<  77  3.8   |  23  |  0.40<L>    Ca    8.6      06 Feb 2019 06:30  Ca    8.6      05 Feb 2019 07:00  Ca    9.0      04 Feb 2019 07:15  Phos  3.1     02-06  Mg     1.8     02-06    TPro  6.0  /  Alb  1.9<L>  /  TBili  0.5  /  DBili  x   /  AST  53<H>  /  ALT  7   /  AlkPhos  278<H>  02-04                          7.5    14.40 )-----------( 368      ( 06 Feb 2019 06:30 )             24.1                         7.6    14.90 )-----------( 396      ( 05 Feb 2019 07:00 )             25.5                         7.7    16.52 )-----------( 406      ( 04 Feb 2019 07:15 )             25.2 ***************************************************************  Micha Bear MD Pgy-1  Contact/Pager [phone] / [phone]  ***************************************************************    TOMEKA PEREZ  68y  MRN: [id number]      Patient is a 68y old  Female who presents with a chief complaint of Symptomatic anemia, Rt Fungating Breast mass , (06 Feb 2019 12:21)      Subjective/ON Events: No acute events overnight. Pain well controlled, breathing well. Pt reports she wants to pursue surgical intervention but appears confused between biopsy & mastectomy.      MEDICATIONS  (STANDING):  amLODIPine   Tablet 5 milliGRAM(s) Oral daily  dexamethasone     Tablet 4 milliGRAM(s) Oral every 6 hours  enoxaparin Injectable 40 milliGRAM(s) SubCutaneous daily  influenza   Vaccine 0.5 milliLiter(s) IntraMuscular once  lactobacillus acidophilus 1 Tablet(s) Oral every 12 hours  levETIRAcetam  Solution 500 milliGRAM(s) Oral two times a day  metroNIDAZOLE    Tablet 1000 milliGRAM(s) Oral daily  sodium chloride 0.9%. 1000 milliLiter(s) (100 mL/Hr) IV Continuous <Continuous>    MEDICATIONS  (PRN):  acetaminophen   Tablet .. 650 milliGRAM(s) Oral every 6 hours PRN Mild Pain (1 - 3), Moderate Pain (4 - 6)  oxyCODONE    IR 2.5 milliGRAM(s) Oral every 6 hours PRN Severe Pain (7 - 10)        Objective:    Vitals: Vital Signs Last 24 Hrs  T(C): 36.9 (02-07-19 @ 05:05), Max: 37.1 (02-06-19 @ 20:36)  T(F): 98.5 (02-07-19 @ 05:05), Max: 98.8 (02-06-19 @ 20:36)  HR: 94 (02-07-19 @ 05:05) (94 - 112)  BP: 137/92 (02-07-19 @ 05:05) (137/92 - 145/76)  RR: 18 (02-07-19 @ 05:05) (18 - 18)  SpO2: 100% (02-07-19 @ 05:05) (94% - 100%)                I&O's Summary    06 Feb 2019 07:01  -  07 Feb 2019 07:00  --------------------------------------------------------  IN: 550 mL / OUT: 0 mL / NET: 550 mL      PHYSICAL EXAM:  GENERAL: NAD  HEAD:  Atraumatic  EYES: EOMI, PERRLA, conjunctiva and sclera clear  NECK: Supple, No JVD  CHEST/LUNG: Clear to auscultation bilaterally, poor inspiratory effort; No wheeze, on RA.  HEART: Regular rate and rhythm; No murmurs, rubs, or gallops  ABDOMEN: Soft, Nontender, Nondistended; Bowel sounds present  EXTREMITIES:  2+ Peripheral Pulses, No clubbing, cyanosis, or edema  NEURO/PSYCH: AAOx3, nonfocal  SKIN: No rashes. Ulcerated fungating breast mass                                        LABS:  02-06    138  |  101  |  7   ----------------------------<  69<L>  3.7   |  25  |  0.39<L>  02-05    140  |  103  |  6<L>  ----------------------------<  65<L>  4.2   |  25  |  0.45<L>  02-04    141  |  105  |  6<L>  ----------------------------<  77  3.8   |  23  |  0.40<L>    Ca    8.6      06 Feb 2019 06:30  Ca    8.6      05 Feb 2019 07:00  Ca    9.0      04 Feb 2019 07:15  Phos  3.1     02-06  Mg     1.8     02-06    TPro  6.0  /  Alb  1.9<L>  /  TBili  0.5  /  DBili  x   /  AST  53<H>  /  ALT  7   /  AlkPhos  278<H>  02-04                          7.5    14.40 )-----------( 368      ( 06 Feb 2019 06:30 )             24.1                         7.6    14.90 )-----------( 396      ( 05 Feb 2019 07:00 )             25.5                         7.7    16.52 )-----------( 406      ( 04 Feb 2019 07:15 )             25.2

## 2019-02-07 NOTE — PROGRESS NOTE ADULT - ATTENDING COMMENTS
Pt seen with resident. Agree with above.  Oxy ir prn pain.  Steroids improved symptoms as well.  Ongoing discussions about palliative surgery/ dmt.

## 2019-02-07 NOTE — PROGRESS NOTE ADULT - PROBLEM SELECTOR PLAN 2
Presents with R fungating breast mass, + Malodorous, + Drainage found to have likely mets to brain and lungs  - Wound care recs in place  - Surgery recommended palliative mastectomy, patient considering  -Per CTA chest, neg for PE though there is mass effect on RUL pulmonary artery. Currently breathing on RA.  - oxycodone 2.5mg q6h prn for pain  - mets to brain, adrenals, lung, skull

## 2019-02-07 NOTE — PROGRESS NOTE ADULT - PROBLEM SELECTOR PLAN 1
Patient wants daughter (Sandy) and co- (Adriano Galan) to be her HCP  -Full code. Has capacity. Patient is  in [employer], has strong mariola that God will heal her  - Palliative care on board  - Had GOC conversation 2/5 (see GOC note). Overall pt seems to want treatment and would like to re-visit discussion w/ surg oncology regarding palliative mastectomy. Ambivalent about speaking with medical oncology. If pt prefers not to proceed with therapy, palliative recommends hospice evaluation.   - currently agrees to blood transfusions.   - palliative surgery offered by surg onc - will reach out to them again today to speak w/ patient  - will continue to have ongoing GOC discussions w/ pt & HCPs Patient wants daughter (Sandy) and co- (Adriano Galan) to be her HCP  -Full code. Has capacity. Patient is  in [employer], has strong mariola that God will heal her  - Palliative care on board  - Had GOC conversation 2/5 (see GOC note). Overall pt seems to want treatment and would like to re-visit discussion w/ surg oncology regarding palliative mastectomy. Ambivalent about speaking with medical oncology. If pt prefers not to proceed with therapy, palliative recommends hospice evaluation.   - currently agrees to blood transfusions.   - palliative surgery offered by surg onc - they spoke again with pt to clarify surgical options. This AM pt expresses interest in mastectomy but appears confused between biopsy & mastectomy. Will revisit this when daughter arrives today.  - will continue to have ongoing GOC discussions w/ pt & HCPs Patient wants daughter (Sandy) and co- (Adriano Galan) to be her HCP  -Full code. Has capacity. Patient is  in [employer], has strong mariola that God will heal her  - Palliative care on board  - Had GOC conversation 2/5 (see GOC note). Overall pt seems to want treatment and would like to re-visit discussion w/ surg oncology regarding palliative mastectomy. Ambivalent about speaking with medical oncology. If pt prefers not to proceed with therapy, palliative recommends hospice evaluation.   - currently agrees to blood transfusions.   - palliative surgery offered by surg onc - they spoke again with pt to clarify surgical options. This AM pt expresses interest in mastectomy but appears confused between biopsy & mastectomy. Will revisit this when daughter arrives today. In the mean time, will get EKG and evaluate operative risk.  - will continue to have ongoing GOC discussions w/ pt & HCPs

## 2019-02-07 NOTE — PROGRESS NOTE ADULT - SUBJECTIVE AND OBJECTIVE BOX
SUBJECTIVE AND OBJECTIVE  INTERVAL HPI/OVERNIGHT EVENTS:  no acute events overnight. surg onc spoke with patient and daughter yesterday, will return today. patient states the lower Oxy dose worked well for her dyspnea. she is still inclined to undergoing a procedure but has not decided biopsy vs mastectomy. she states she wants whatever will make her feel better. walked with PT yesterday.    DNR on chart: X    Allergies    No Known Allergies    Intolerances    MEDICATIONS  (STANDING):  amLODIPine   Tablet 5 milliGRAM(s) Oral daily  dexamethasone     Tablet 4 milliGRAM(s) Oral every 6 hours  enoxaparin Injectable 40 milliGRAM(s) SubCutaneous daily  influenza   Vaccine 0.5 milliLiter(s) IntraMuscular once  lactobacillus acidophilus 1 Tablet(s) Oral every 12 hours  levETIRAcetam  Solution 500 milliGRAM(s) Oral two times a day  metroNIDAZOLE    Tablet 1000 milliGRAM(s) Oral daily  sodium chloride 0.9%. 1000 milliLiter(s) (100 mL/Hr) IV Continuous <Continuous>    MEDICATIONS  (PRN):  acetaminophen   Tablet .. 650 milliGRAM(s) Oral every 6 hours PRN Mild Pain (1 - 3), Moderate Pain (4 - 6)  oxyCODONE    IR 2.5 milliGRAM(s) Oral every 6 hours PRN Severe Pain (7 - 10)      ITEMS UNCHECKED ARE NOT PRESENT    PRESENT SYMPTOMS: [ ]Unable to obtain due to poor mentation   Source if other than patient:  [ ]Family   [ ]Team     Pain (Impact on QOL): Irritating  Location: R Breast  Minimal acceptable level (0-10 scale):            Aggravating factors: Wound Care  Quality:  Radiation:  Severity (0-10 scale):    Timing: constant after wound care, with inspiration    Dyspnea:                           [x]Mild [ ]Moderate [ ]Severe  Anxiety:                             [ ]Mild [ ]Moderate [ ]Severe  Fatigue:                             [x]Mild [ ]Moderate [ ]Severe  Nausea:                             [ ]Mild [ ]Moderate [ ]Severe  Loss of appetite:              [ ]Mild [ ]Moderate [ ]Severe  Constipation:                    [x]Mild [ ]Moderate [ ]Severe    PAIN AD Score:	  [email or web address].[place].[email or web address]/cog/painad.pdf (Ctrl + left click to view)    Other Symptoms:  [ ]All other review of systems negative     Karnofsky Performance Score/Palliative Performance Status Version 2:         40%    http://palliative.info/resource_material/[email or web address].pdf    PHYSICAL EXAM:  Vital Signs Last 24 Hrs  T(C): 36.3 (07 Feb 2019 12:20), Max: 37.1 (06 Feb 2019 20:36)  T(F): 97.3 (07 Feb 2019 12:20), Max: 98.8 (06 Feb 2019 20:36)  HR: 94 (07 Feb 2019 12:20) (94 - 109)  BP: 128/80 (07 Feb 2019 12:20) (128/80 - 143/88)  BP(mean): --  RR: 17 (07 Feb 2019 12:20) (17 - 18)  SpO2: 95% (07 Feb 2019 12:20) (94% - 100%) I&O's Summary    06 Feb 2019 07:01  -  07 Feb 2019 07:00  --------------------------------------------------------  IN: 550 mL / OUT: 0 mL / NET: 550 mL    GENERAL:  [x]Alert  [x]Oriented x3   [ ]Lethargic  [ ]Cachexia  [ ]Unarousable  [x]Verbal  [ ]Non-Verbal    Behavioral:   [ ] Anxiety  [ ] Delirium [ ] Agitation [ ] Other    HEENT:  [x]Normal   [ ]Dry mouth   [ ]ET Tube/Trach  [ ]Oral lesions    PULMONARY:   [x]Clear [ ]Tachypnea  [ ]Audible excessive secretions   [ ]Rhonchi        [ ]Right [ ]Left [ ]Bilateral  [ ]Crackles        [ ]Right [ ]Left [ ]Bilateral  [ ]Wheezing     [ ]Right [ ]Left [ ]Bilateral    CARDIOVASCULAR:    [ ]Regular [ ]Irregular [x]Tachy  [ ]Aly [ ]Murmur [ ]Other    GASTROINTESTINAL:  [x]Soft  [ ]Distended   [x]+BS  [ ]Non tender [ ]Tender  [ ]PEG [ ]OGT/ NGT   Last BM:    GENITOURINARY:  [ ]Normal [ ] Incontinent   [ ]Oliguria/Anuria   [ ]Waller    MUSCULOSKELETAL:   [x]Normal   [ ]Weakness  [ ]Bed/Wheelchair bound [ ]Edema    NEUROLOGIC:   [x]No focal deficits  [ ] Cognitive impairment  [ ] Dysphagia [ ]Dysarthria [ ] Paresis [ ]Other     SKIN:   [ ]Normal   [ ]Pressure ulcer(s)  [ ]Rash [x] Fungating R Breast mass    CRITICAL CARE:  [ ] Shock Present  [ ] Septic [ ] Cardiogenic [ ] Neurologic [ ] Hypovolemic [ ] Vasopressors [ ] Inotropes   [ ] Respiratory failure present  [ ] Acute [ ] Chronic [ ] Hypoxic [ ] Hypercarbic [ ] Other  [ ] Other organ failure     LABS:                        7.5    14.40 )-----------( 368      ( 06 Feb 2019 06:30 )             24.1   02-07    140  |  100  |  10  ----------------------------<  122<H>  3.8   |  25  |  0.43<L>    Ca    8.5      07 Feb 2019 06:35  Phos  3.6     02-07  Mg     2.0     02-07          RADIOLOGY & ADDITIONAL STUDIES:    Protein Calorie Malnutrition Present: [x] yes [ ] no  [ ] PPSV2 < or = 30%  [ ] significant weight loss [x] poor nutritional intake [ ] anasarca [ ] catabolic state Albumin, Serum: 1.9 g/dL (02-04-19 @ 07:15)  Artificial Nutrition [ ]     REFERRALS:   [ ]Chaplaincy  [ ] Hospice  [ ]Child Life  [ ]Social Work  [ ]Case management [ ]Holistic Therapy   Goals of Care Document: Goals of Care Conversation - Personal Advance Directive [GISELA Aguirre] (02-05-19 @ 17:02)      Anselmo Lee, PGY-3 Pager#[phone]  ***To Be Reviewed with Attending

## 2019-02-07 NOTE — PROGRESS NOTE ADULT - PROBLEM SELECTOR PLAN 2
Highly concerning for malignancy with potential spread of disease to lungs/LN/brain but no definitive tissue biopsy at this time.  -patient seems to be leaning towards further workup at this time but wants daughter present  -patient's stated goal was to feel better, explained that palliative mastectomy would both improved her respiratory status and provide tissue diagnosis  -explained Radiation would only be an option with tissue diagnosis

## 2019-02-07 NOTE — PROGRESS NOTE ADULT - PROBLEM SELECTOR PLAN 4
-patient amenable to procedure (biopsy vs mastectomy), advised her to focus on one decision at a time as she is becoming overwhelmed with the previous discussions of RT, chemo, etc. explained that none of those decisions can be made before tissue diagnosis so a decision needs to be made regarding that first.  -reiterated to patient and daughter that any treatment would be palliative in nature  -will follow for ongoing symptom management and GOC

## 2019-02-07 NOTE — PROGRESS NOTE ADULT - PROBLEM SELECTOR PLAN 9
DVT PPx: lovenox 40mg QD   Diet: DASH diet  Dispo: pending workup of breast mass and ongoing GOC discussions DVT PPx: lovenox 40mg QD   Diet: DASH diet  Dispo: pending workup of breast mass and ongoing GOC discussions. PT recommending rehab upon discharge.

## 2019-02-07 NOTE — PROGRESS NOTE ADULT - PROBLEM SELECTOR PLAN 3
- secondary to metastatic breast cancer  - MRI head with >15 metastatic lesions within the brain. Keppra 500mg BID & Decadron 4mg q6h. Discussed w/ neurosurg if ok for pt to be on DVT PPx - given lesions don't appear hemorrhagic, okay to continue Lovenox.  - to be evaluated by radiation oncology

## 2019-02-07 NOTE — PROGRESS NOTE ADULT - PROBLEM SELECTOR PLAN 1
Likely multifactorial from restriction due to burdensome breast mass as well as associated lung disease.  -c/w Oxy IR 2.5mg q6h, encouraged patient to use for improved symptom control  -discussed potential benefits of palliative mastectomy

## 2019-02-08 PROBLEM — Z00.00 ENCOUNTER FOR PREVENTIVE HEALTH EXAMINATION: Status: ACTIVE | Noted: 2019-02-08

## 2019-02-08 LAB
ALBUMIN SERPL ELPH-MCNC: 2 G/DL — LOW (ref 3.3–5)
ALP SERPL-CCNC: 267 U/L — HIGH (ref 40–120)
ALT FLD-CCNC: 9 U/L — SIGNIFICANT CHANGE UP (ref 4–33)
ANION GAP SERPL CALC-SCNC: 16 MMO/L — HIGH (ref 7–14)
APTT BLD: 26.5 SEC — LOW (ref 27.5–36.3)
AST SERPL-CCNC: 77 U/L — HIGH (ref 4–32)
BILIRUB SERPL-MCNC: < 0.2 MG/DL — LOW (ref 0.2–1.2)
BLD GP AB SCN SERPL QL: NEGATIVE — SIGNIFICANT CHANGE UP
BUN SERPL-MCNC: 19 MG/DL — SIGNIFICANT CHANGE UP (ref 7–23)
CALCIUM SERPL-MCNC: 8.5 MG/DL — SIGNIFICANT CHANGE UP (ref 8.4–10.5)
CHLORIDE SERPL-SCNC: 102 MMOL/L — SIGNIFICANT CHANGE UP (ref 98–107)
CO2 SERPL-SCNC: 23 MMOL/L — SIGNIFICANT CHANGE UP (ref 22–31)
CREAT SERPL-MCNC: 0.48 MG/DL — LOW (ref 0.5–1.3)
GLUCOSE SERPL-MCNC: 122 MG/DL — HIGH (ref 70–99)
HCT VFR BLD CALC: 23.6 % — LOW (ref 34.5–45)
HGB BLD-MCNC: 7.2 G/DL — LOW (ref 11.5–15.5)
INR BLD: 1.29 — HIGH (ref 0.88–1.17)
MAGNESIUM SERPL-MCNC: 2 MG/DL — SIGNIFICANT CHANGE UP (ref 1.6–2.6)
MCHC RBC-ENTMCNC: 28.2 PG — SIGNIFICANT CHANGE UP (ref 27–34)
MCHC RBC-ENTMCNC: 30.5 % — LOW (ref 32–36)
MCV RBC AUTO: 92.5 FL — SIGNIFICANT CHANGE UP (ref 80–100)
NRBC # FLD: 0.03 K/UL — LOW (ref 25–125)
PHOSPHATE SERPL-MCNC: 2.6 MG/DL — SIGNIFICANT CHANGE UP (ref 2.5–4.5)
PLATELET # BLD AUTO: 401 K/UL — HIGH (ref 150–400)
PMV BLD: 9.8 FL — SIGNIFICANT CHANGE UP (ref 7–13)
POTASSIUM SERPL-MCNC: 3.9 MMOL/L — SIGNIFICANT CHANGE UP (ref 3.5–5.3)
POTASSIUM SERPL-SCNC: 3.9 MMOL/L — SIGNIFICANT CHANGE UP (ref 3.5–5.3)
PROT SERPL-MCNC: 6 G/DL — SIGNIFICANT CHANGE UP (ref 6–8.3)
PROTHROM AB SERPL-ACNC: 14.4 SEC — HIGH (ref 9.8–13.1)
RBC # BLD: 2.55 M/UL — LOW (ref 3.8–5.2)
RBC # FLD: 18.7 % — HIGH (ref 10.3–14.5)
RH IG SCN BLD-IMP: POSITIVE — SIGNIFICANT CHANGE UP
SODIUM SERPL-SCNC: 141 MMOL/L — SIGNIFICANT CHANGE UP (ref 135–145)
WBC # BLD: 15.77 K/UL — HIGH (ref 3.8–10.5)
WBC # FLD AUTO: 15.77 K/UL — HIGH (ref 3.8–10.5)

## 2019-02-08 PROCEDURE — 99223 1ST HOSP IP/OBS HIGH 75: CPT | Mod: GC

## 2019-02-08 PROCEDURE — 99233 SBSQ HOSP IP/OBS HIGH 50: CPT | Mod: GC

## 2019-02-08 PROCEDURE — 99232 SBSQ HOSP IP/OBS MODERATE 35: CPT

## 2019-02-08 PROCEDURE — 99232 SBSQ HOSP IP/OBS MODERATE 35: CPT | Mod: GC

## 2019-02-08 RX ORDER — LEVETIRACETAM 250 MG/1
250 TABLET, FILM COATED ORAL
Qty: 0 | Refills: 0 | Status: DISCONTINUED | OUTPATIENT
Start: 2019-02-08 | End: 2019-02-13

## 2019-02-08 RX ADMIN — LEVETIRACETAM 250 MILLIGRAM(S): 250 TABLET, FILM COATED ORAL at 18:43

## 2019-02-08 RX ADMIN — ENOXAPARIN SODIUM 40 MILLIGRAM(S): 100 INJECTION SUBCUTANEOUS at 12:44

## 2019-02-08 RX ADMIN — Medication 1 TABLET(S): at 06:14

## 2019-02-08 RX ADMIN — Medication 4 MILLIGRAM(S): at 06:14

## 2019-02-08 RX ADMIN — Medication 1000 MILLIGRAM(S): at 18:42

## 2019-02-08 RX ADMIN — Medication 4 MILLIGRAM(S): at 00:01

## 2019-02-08 RX ADMIN — LEVETIRACETAM 500 MILLIGRAM(S): 250 TABLET, FILM COATED ORAL at 06:14

## 2019-02-08 RX ADMIN — AMLODIPINE BESYLATE 5 MILLIGRAM(S): 2.5 TABLET ORAL at 06:14

## 2019-02-08 RX ADMIN — Medication 1 TABLET(S): at 18:42

## 2019-02-08 RX ADMIN — Medication 4 MILLIGRAM(S): at 12:45

## 2019-02-08 RX ADMIN — Medication 4 MILLIGRAM(S): at 18:42

## 2019-02-08 NOTE — PROGRESS NOTE ADULT - ASSESSMENT
68 year old female with R fungating breast mass and metastatic disease to lung, brain, and bone.    - After extensive discussion with patient, her daughter, and , patient would now like to pursue palliative mastectomy  - Will plan for mastectomy w/ possible plastic surgery closure. No need for biopsy prior to surgery.  - Patient states she has no medical problems but does not see a doctor regularly - will need thorough medical evaluation to determine operative risk. Please document medical optimization and risk stratification for surgery.  - Discussed with attending, Dr. Barreto    Surgical Oncology / D team surgery  Pager [phone]

## 2019-02-08 NOTE — PROGRESS NOTE ADULT - ASSESSMENT
69yo Woman w/ Fungating R Breast Mass presenting with SOB found to have symptomatic anemia in the setting of likely malignancy with likely spread of disease to lungs and MRI showing many brain metastases, now being planned for palliative mastectomy.

## 2019-02-08 NOTE — CONSULT NOTE ADULT - SUBJECTIVE AND OBJECTIVE BOX
HPI:    PAST MEDICAL & SURGICAL HISTORY:  No pertinent past medical history  No significant past surgical history      FAMILY HISTORY:  Family history of pancreatic cancer (Father)  Family history of breast cancer in sister      SOCIAL HISTORY:  Smokin packs years  EtOH Use: Denies  Occupation:   Exposures: Denies  Recent Travel: Denies    Allergies    No Known Allergies    Intolerances        HOME MEDICATIONS:  Home Medications:  acetaminophen 325 mg oral tablet: 2 tab(s) orally every 6 hours, As needed, Mild Pain (1 - 3), Moderate Pain (4 - 6) (2019 11:39)  amLODIPine 5 mg oral tablet: 1 tab(s) orally once a day (2019 11:39)  dexamethasone 4 mg oral tablet: 1 tab(s) orally every 6 hours (2019 11:39)  lactobacillus acidophilus oral capsule: 1 tab(s) orally 2 times a day (2019 11:39)  levETIRAcetam 100 mg/mL oral solution: 5 milliliter(s) orally 2 times a day (2019 11:39)  metroNIDAZOLE 500 mg oral tablet: 2 tab(s) orally once a day (2019 11:39)  oxyCODONE: 2.5 milligram(s) orally every 6 hours, As Needed (2019 11:39)      REVIEW OF SYSTEMS:  Constitutional: No fevers or chills. No weight loss. + fatigue or generalised malaise.  Eyes: No itching or discharge from the eyes  ENT: No ear pain. No ear discharge. No nasal congestion. No post nasal drip. No epistaxis. No throat pain. No sore throat. No difficulty swallowing.   CV: No chest pain. No palpitations. No lightheadedness or dizziness.   Resp: no dyspnea at rest. occasional dyspnea on exertion. No orthopnea. No wheezing. rare dry cough. No stridor. No sputum production. No chest pain with respiration.  GI: No nausea. No vomiting. No diarrhea.  MSK: No joint pain or pain in any extremities  Integumentary: fungating breast mass. No pedal edema.  Neurological: No gross motor weakness. No sensory changes.    OBJECTIVE:  ICU Vital Signs Last 24 Hrs  T(C): 36.8 (2019 13:30), Max: 36.9 (2019 20:56)  T(F): 98.3 (2019 13:30), Max: 98.4 (2019 20:56)  HR: 91 (2019 13:30) (89 - 95)  BP: 135/87 (2019 13:30) (130/87 - 137/86)  RR: 18 (2019 13:30) (18 - 18)  SpO2: 97% (2019 13:30) (97% - 99%)    PHYSICAL EXAM:  General: Awake, alert, oriented X 3.   HEENT: Atraumatic, normocephalic.                 Mallampatti Grade 2  Lymph Nodes: No palpable lymphadenopathy  Neck: No JVD. No carotid bruit.   Respiratory: Normal chest expansion                         Normal percussion                         Normal and equal air entry                         No significant wheeze, rhonchi or rales.  Cardiovascular: S1 S2 normal. No murmurs, rubs or gallops.   Abdomen: Soft, non-tender, non-distended. No organomegaly.  Extremities: Warm to touch. Peripheral pulse palpable. No pedal edema.   Skin: Fungating right breast mass.   Neurological: Motor and sensory examination equal and normal in all four extremities.  Psychiatry: Appropriate mood and affect.    HOSPITAL MEDICATIONS:  MEDICATIONS  (STANDING):  amLODIPine   Tablet 5 milliGRAM(s) Oral daily  dexamethasone     Tablet 4 milliGRAM(s) Oral every 6 hours  enoxaparin Injectable 40 milliGRAM(s) SubCutaneous daily  influenza   Vaccine 0.5 milliLiter(s) IntraMuscular once  lactobacillus acidophilus 1 Tablet(s) Oral every 12 hours  levETIRAcetam  Solution 500 milliGRAM(s) Oral two times a day  metroNIDAZOLE    Tablet 1000 milliGRAM(s) Oral daily  sodium chloride 0.9%. 1000 milliLiter(s) (100 mL/Hr) IV Continuous <Continuous>    MEDICATIONS  (PRN):  acetaminophen   Tablet .. 650 milliGRAM(s) Oral every 6 hours PRN Mild Pain (1 - 3), Moderate Pain (4 - 6)  oxyCODONE    IR 2.5 milliGRAM(s) Oral every 6 hours PRN Severe Pain (7 - 10)    LABS:                                   7.2    15.77 )-----------( 401      ( 2019 06:45 )             23.6       02-08    141  |  102  |  19  ----------------------------<  122<H>  3.9   |  23  |  0.48<L>    Ca    8.5      2019 06:45  Phos  2.6       Mg     2.0         TPro  6.0  /  Alb  2.0<L>  /  TBili  < 0.2<L>  /  DBili  x   /  AST  77<H>  /  ALT  9   /  AlkPhos  267<H>        RADIOLOGY:  [X] Reviewed and interpreted by me - Patient with a large breast mass. Multiple pulmonary masses and hilar and mediastinal adenopathy  Narrowing of the BI/Right mainstem from the adenopathy. HPI: 68F presenting with malodorous large right fungating breast mass with SOB and weight loss (25 lbs in 2 mths) x several weeks. Pt does not go to the doctor and denies taking any meds but endorses that "God will take care of her". Pt complains of falling on the stairs 2-3 weeks ago and has difficulty ambulating to bathroom due to weakness.    Patient reports that her sister had breast cancer, was treated with chemotherapy, passed away in 2017.  Reports her last mammogram was "ages ago."  Denies any specific pulmonary issues. Says her breast mass was visible to everyone but did not seek medical attention for no specific reason.     No allergies. No asthma. Was a smoker in the past and smoked for 10-20 pack years.    PAST MEDICAL & SURGICAL HISTORY:  No pertinent past medical history  No significant past surgical history      FAMILY HISTORY:  Family history of pancreatic cancer (Father)  Family history of breast cancer in sister      SOCIAL HISTORY:  Smokin packs years  EtOH Use: Denies  Occupation:   Exposures: Denies  Recent Travel: Denies    Allergies    No Known Allergies    Intolerances        HOME MEDICATIONS:  Home Medications:  acetaminophen 325 mg oral tablet: 2 tab(s) orally every 6 hours, As needed, Mild Pain (1 - 3), Moderate Pain (4 - 6) (2019 11:39)  amLODIPine 5 mg oral tablet: 1 tab(s) orally once a day (2019 11:39)  dexamethasone 4 mg oral tablet: 1 tab(s) orally every 6 hours (2019 11:39)  lactobacillus acidophilus oral capsule: 1 tab(s) orally 2 times a day (2019 11:39)  levETIRAcetam 100 mg/mL oral solution: 5 milliliter(s) orally 2 times a day (2019 11:39)  metroNIDAZOLE 500 mg oral tablet: 2 tab(s) orally once a day (2019 11:39)  oxyCODONE: 2.5 milligram(s) orally every 6 hours, As Needed (2019 11:39)      REVIEW OF SYSTEMS:  Constitutional: No fevers or chills. No weight loss. + fatigue or generalised malaise.  Eyes: No itching or discharge from the eyes  ENT: No ear pain. No ear discharge. No nasal congestion. No post nasal drip. No epistaxis. No throat pain. No sore throat. No difficulty swallowing.   CV: No chest pain. No palpitations. No lightheadedness or dizziness.   Resp: no dyspnea at rest. occasional dyspnea on exertion. No orthopnea. No wheezing. rare dry cough. No stridor. No sputum production. No chest pain with respiration.  GI: No nausea. No vomiting. No diarrhea.  MSK: No joint pain or pain in any extremities  Integumentary: fungating breast mass. No pedal edema.  Neurological: No gross motor weakness. No sensory changes.    OBJECTIVE:  ICU Vital Signs Last 24 Hrs  T(C): 36.8 (2019 13:30), Max: 36.9 (2019 20:56)  T(F): 98.3 (2019 13:30), Max: 98.4 (2019 20:56)  HR: 91 (2019 13:30) (89 - 95)  BP: 135/87 (2019 13:30) (130/87 - 137/86)  RR: 18 (2019 13:30) (18 - 18)  SpO2: 97% (2019 13:30) (97% - 99%)    PHYSICAL EXAM:  General: Awake, alert, oriented X 3.   HEENT: Atraumatic, normocephalic.                 Mallampatti Grade 2  Lymph Nodes: No palpable lymphadenopathy  Neck: No JVD. No carotid bruit.   Respiratory: Normal chest expansion                         Normal percussion                         Normal and equal air entry                         No significant wheeze, rhonchi or rales.  Cardiovascular: S1 S2 normal. No murmurs, rubs or gallops.   Abdomen: Soft, non-tender, non-distended. No organomegaly.  Extremities: Warm to touch. Peripheral pulse palpable. No pedal edema.   Skin: Fungating right breast mass.   Neurological: Motor and sensory examination equal and normal in all four extremities.  Psychiatry: Appropriate mood and affect.    HOSPITAL MEDICATIONS:  MEDICATIONS  (STANDING):  amLODIPine   Tablet 5 milliGRAM(s) Oral daily  dexamethasone     Tablet 4 milliGRAM(s) Oral every 6 hours  enoxaparin Injectable 40 milliGRAM(s) SubCutaneous daily  influenza   Vaccine 0.5 milliLiter(s) IntraMuscular once  lactobacillus acidophilus 1 Tablet(s) Oral every 12 hours  levETIRAcetam  Solution 500 milliGRAM(s) Oral two times a day  metroNIDAZOLE    Tablet 1000 milliGRAM(s) Oral daily  sodium chloride 0.9%. 1000 milliLiter(s) (100 mL/Hr) IV Continuous <Continuous>    MEDICATIONS  (PRN):  acetaminophen   Tablet .. 650 milliGRAM(s) Oral every 6 hours PRN Mild Pain (1 - 3), Moderate Pain (4 - 6)  oxyCODONE    IR 2.5 milliGRAM(s) Oral every 6 hours PRN Severe Pain (7 - 10)    LABS:                                   7.2    15.77 )-----------( 401      ( 2019 06:45 )             23.6       -    141  |  102  |  19  ----------------------------<  122<H>  3.9   |  23  |  0.48<L>    Ca    8.5      2019 06:45  Phos  2.6       Mg     2.0         TPro  6.0  /  Alb  2.0<L>  /  TBili  < 0.2<L>  /  DBili  x   /  AST  77<H>  /  ALT  9   /  AlkPhos  267<H>        RADIOLOGY:  [X] Reviewed and interpreted by me - Patient with a large breast mass. Multiple pulmonary masses and hilar and mediastinal adenopathy  Minimal narrowing of the BI/Right mainstem from the adenopathy.

## 2019-02-08 NOTE — PROGRESS NOTE ADULT - PROBLEM SELECTOR PLAN 5
Likely anemia of chronic disease given Iron Studies, ferritin, Vit B12, Folate, LDH, Haptoglobin, Retic count  -Hgb hovering around 7.5  -monitor H/H, keep active T&S

## 2019-02-08 NOTE — PROGRESS NOTE ADULT - PROBLEM SELECTOR PLAN 4
-patient amenable to mastectomy; discussions of RT, chemo, etc. were overwhelming can revisit post-procedure  -it has been stated that that any treatment offered would be palliative, not curative, in nature

## 2019-02-08 NOTE — PROGRESS NOTE ADULT - SUBJECTIVE AND OBJECTIVE BOX
***************************************************************  Micha Bear MD Pgy-1  Contact/Pager [phone] / [phone]  ***************************************************************    TOMEKA PEREZ  68y  MRN: [id number]      Patient is a 68y old  Female who presents with a chief complaint of Symptomatic anemia, Rt Fungating Breast mass , (08 Feb 2019 13:38)      Subjective/ON Events: Pt discussed surgical options w/ surgery team yesterday evening and has decided to pursue palliative mastectomy. Otherwise no acute events, pain well controlled.      MEDICATIONS  (STANDING):  amLODIPine   Tablet 5 milliGRAM(s) Oral daily  dexamethasone     Tablet 4 milliGRAM(s) Oral every 6 hours  enoxaparin Injectable 40 milliGRAM(s) SubCutaneous daily  influenza   Vaccine 0.5 milliLiter(s) IntraMuscular once  lactobacillus acidophilus 1 Tablet(s) Oral every 12 hours  levETIRAcetam  Solution 500 milliGRAM(s) Oral two times a day  metroNIDAZOLE    Tablet 1000 milliGRAM(s) Oral daily  sodium chloride 0.9%. 1000 milliLiter(s) (100 mL/Hr) IV Continuous <Continuous>    MEDICATIONS  (PRN):  acetaminophen   Tablet .. 650 milliGRAM(s) Oral every 6 hours PRN Mild Pain (1 - 3), Moderate Pain (4 - 6)  oxyCODONE    IR 2.5 milliGRAM(s) Oral every 6 hours PRN Severe Pain (7 - 10)      Objective:    Vitals: Vital Signs Last 24 Hrs  T(C): 36.8 (02-08-19 @ 13:30), Max: 36.9 (02-07-19 @ 20:56)  T(F): 98.3 (02-08-19 @ 13:30), Max: 98.4 (02-07-19 @ 20:56)  HR: 91 (02-08-19 @ 13:30) (89 - 95)  BP: 135/87 (02-08-19 @ 13:30) (130/87 - 137/86)  RR: 18 (02-08-19 @ 13:30) (18 - 18)  SpO2: 97% (02-08-19 @ 13:30) (97% - 99%)                 PHYSICAL EXAM:  GENERAL: NAD  HEAD:  Atraumatic  EYES: EOMI, PERRLA, conjunctiva and sclera clear  NECK: Supple, No JVD  CHEST/LUNG: Clear to auscultation bilaterally, poor inspiratory effort; No wheeze, on RA.  HEART: Regular rate and rhythm; No murmurs, rubs, or gallops  ABDOMEN: Soft, Nontender, Nondistended; Bowel sounds present  EXTREMITIES:  2+ Peripheral Pulses, No clubbing, cyanosis, or edema  NEURO/PSYCH: AAOx3, nonfocal  SKIN: No rashes. Ulcerated fungating breast mass                                          LABS:  02-08    141  |  102  |  19  ----------------------------<  122<H>  3.9   |  23  |  0.48<L>  02-07    140  |  100  |  10  ----------------------------<  122<H>  3.8   |  25  |  0.43<L>  02-06    138  |  101  |  7   ----------------------------<  69<L>  3.7   |  25  |  0.39<L>    Ca    8.5      08 Feb 2019 06:45  Ca    8.5      07 Feb 2019 06:35  Ca    8.6      06 Feb 2019 06:30  Phos  2.6     02-08  Mg     2.0     02-08    TPro  6.0  /  Alb  2.0<L>  /  TBili  < 0.2<L>  /  DBili  x   /  AST  77<H>  /  ALT  9   /  AlkPhos  267<H>  02-08      PT/INR - ( 08 Feb 2019 06:45 )   PT: 14.4 SEC;   INR: 1.29       PTT - ( 08 Feb 2019 06:45 )  PTT:26.5 SEC                                    7.2    15.77 )-----------( 401      ( 08 Feb 2019 06:45 )             23.6                         7.5    14.40 )-----------( 368      ( 06 Feb 2019 06:30 )             24.1

## 2019-02-08 NOTE — CONSULT NOTE ADULT - SUBJECTIVE AND OBJECTIVE BOX
S:  67 y/o Female no known pmhx presents with malodorous huge R fungating breast mass with SOB and weight loss found to be metastatic lesion who has discussed with palliative care to have a palliative mastectomy by Dr. Barreto. Plastic Surgery consulted for intraop co management of wound s/p mastectomy next week.    O:  Vital Signs Last 24 Hrs  T(C): 36.3 (08 Feb 2019 05:17), Max: 36.9 (07 Feb 2019 20:56)  T(F): 97.4 (08 Feb 2019 05:17), Max: 98.4 (07 Feb 2019 20:56)  HR: 95 (08 Feb 2019 05:17) (89 - 95)  BP: 137/86 (08 Feb 2019 05:17) (128/80 - 137/86)  BP(mean): --  RR: 18 (08 Feb 2019 05:17) (17 - 18)  SpO2: 99% (08 Feb 2019 05:17) (95% - 99%)    Gen: alert, NAD  Resp: no increase work of breathing  Right breast:   fungating mass extending to axilla, superior to nipple

## 2019-02-08 NOTE — PROGRESS NOTE ADULT - PROBLEM SELECTOR PLAN 1
Patient wants daughter (Sandy) and co- (Adriano Galan) to be her HCP  -Full code. Has capacity. Patient is  in [place], has strong mariola that God will heal her  - Palliative care on board  - Had GOC conversation 2/5 (see GOC note). Pt would like to proceed with palliative mastectomy. Surg onc team on board, pt will likely have surgery early next week. Pt still ambivalent about speaking with medical oncology.  - currently agrees to blood transfusions.   - will continue to have ongoing GOC discussions w/ pt & HCPs

## 2019-02-08 NOTE — CONSULT NOTE ADULT - ASSESSMENT
68F to undergo palliative R. mastectomy next week for fungating mass    - will plan for skin graft, versus wound vac s/p mastectomy  - medical management as per primary team    Plastic Surgery [id number]

## 2019-02-08 NOTE — PROGRESS NOTE ADULT - SUBJECTIVE AND OBJECTIVE BOX
Surgical Oncology Progress Note      SUBJECTIVE: Patient seen and examined. After discussion with patient, daughter, and , patient would like to pursue palliative mastectomy. States that the weight of the mass on her chest has been making it hard to breath.      OBJECTIVE:     ** Vital signs / I&O's **    Vital Signs Last 24 Hrs  T(C): 36.3 (08 Feb 2019 05:17), Max: 36.9 (07 Feb 2019 20:56)  T(F): 97.4 (08 Feb 2019 05:17), Max: 98.4 (07 Feb 2019 20:56)  HR: 95 (08 Feb 2019 05:17) (89 - 95)  BP: 137/86 (08 Feb 2019 05:17) (128/80 - 137/86)  BP(mean): --  RR: 18 (08 Feb 2019 05:17) (17 - 18)  SpO2: 99% (08 Feb 2019 05:17) (95% - 99%)        ** Physical Exam **  Gen: Alert, NAD  Breast: Dressing clean and dry, no signs of bleeding    ** Labs **                          7.2    15.77 )-----------( 401      ( 08 Feb 2019 06:45 )             23.6     08 Feb 2019 06:45    141    |  102    |  19     ----------------------------<  122    3.9     |  23     |  0.48     Ca    8.5        08 Feb 2019 06:45  Phos  2.6       08 Feb 2019 06:45  Mg     2.0       08 Feb 2019 06:45    TPro  6.0    /  Alb  2.0    /  TBili  < 0.2  /  DBili  x      /  AST  77     /  ALT  9      /  AlkPhos  267    08 Feb 2019 06:45    PT/INR - ( 08 Feb 2019 06:45 )   PT: 14.4 SEC;   INR: 1.29          PTT - ( 08 Feb 2019 06:45 )  PTT:26.5 SEC  CAPILLARY BLOOD GLUCOSE            LIVER FUNCTIONS - ( 08 Feb 2019 06:45 )  Alb: 2.0 g/dL / Pro: 6.0 g/dL / ALK PHOS: 267 u/L / ALT: 9 u/L / AST: 77 u/L / GGT: x               MEDICATIONS  (STANDING):  amLODIPine   Tablet 5 milliGRAM(s) Oral daily  dexamethasone     Tablet 4 milliGRAM(s) Oral every 6 hours  enoxaparin Injectable 40 milliGRAM(s) SubCutaneous daily  influenza   Vaccine 0.5 milliLiter(s) IntraMuscular once  lactobacillus acidophilus 1 Tablet(s) Oral every 12 hours  levETIRAcetam  Solution 500 milliGRAM(s) Oral two times a day  metroNIDAZOLE    Tablet 1000 milliGRAM(s) Oral daily  sodium chloride 0.9%. 1000 milliLiter(s) (100 mL/Hr) IV Continuous <Continuous>    MEDICATIONS  (PRN):  acetaminophen   Tablet .. 650 milliGRAM(s) Oral every 6 hours PRN Mild Pain (1 - 3), Moderate Pain (4 - 6)  oxyCODONE    IR 2.5 milliGRAM(s) Oral every 6 hours PRN Severe Pain (7 - 10)

## 2019-02-08 NOTE — PROGRESS NOTE ADULT - ATTENDING COMMENTS
- I have seen and examined the patient on rounds. Patient's chart, labs, images and reports reviewed.  I had seen this pt last week for a large fungating necrotic right breast mass with systemic mets  Pt at that time was refusing to be evaluated by a surgeon and declining for surgical intervention  Consult recalled as pt is now amenable for surgery.  On my exam pt has a very large necrotic foul smelling fungating fixed R breast mass  Will plan for toilet mastectomy monday  - I have discussed the diagnosis with the patient in detail. Patient expressed verbal understanding and willingness to proceed with proposed plan. All questions answered  -Risks, benefits, alternatives, complications including but not limited to bleeding, infection, injury to adjacent structures, need for further procedures explained to patient in detail. Patient expressed verbal understanding and willingness to proceed with proposed plan.  Regards  Darron Barreto MD  Division of Surgical Oncology  [address] [place] [id number]  Ph:[phone]

## 2019-02-08 NOTE — PROGRESS NOTE ADULT - SUBJECTIVE AND OBJECTIVE BOX
SUBJECTIVE AND OBJECTIVE  INTERVAL HPI/OVERNIGHT EVENTS:  no acute events overnight. patient is amenable to procedures and is now being planned for mastectomy after discussion with family. patient reports dizziness for past 1.5 days, potentially due to Keppra. She has avoided Oxy while she has had these symptoms as she did not want to potentially exacerbate them. She denies any pain but endorses some lingering dyspnea. overall she states she is comfortable    DNR on chart: X    Allergies  No Known Allergies    MEDICATIONS  (STANDING):  amLODIPine   Tablet 5 milliGRAM(s) Oral daily  dexamethasone     Tablet 4 milliGRAM(s) Oral every 6 hours  enoxaparin Injectable 40 milliGRAM(s) SubCutaneous daily  influenza   Vaccine 0.5 milliLiter(s) IntraMuscular once  lactobacillus acidophilus 1 Tablet(s) Oral every 12 hours  levETIRAcetam  Solution 500 milliGRAM(s) Oral two times a day  metroNIDAZOLE    Tablet 1000 milliGRAM(s) Oral daily  sodium chloride 0.9%. 1000 milliLiter(s) (100 mL/Hr) IV Continuous <Continuous>    MEDICATIONS  (PRN):  acetaminophen   Tablet .. 650 milliGRAM(s) Oral every 6 hours PRN Mild Pain (1 - 3), Moderate Pain (4 - 6)  oxyCODONE    IR 2.5 milliGRAM(s) Oral every 6 hours PRN Severe Pain (7 - 10)      ITEMS UNCHECKED ARE NOT PRESENT    PRESENT SYMPTOMS: [ ]Unable to obtain due to poor mentation   Source if other than patient:  [ ]Family   [ ]Team     Pain (Impact on QOL): None  Location:   Minimal acceptable level (0-10 scale):            Aggravating factors:  Quality:  Radiation:  Severity (0-10 scale):    Timing:    Dyspnea:                           [x]Mild [ ]Moderate [ ]Severe  Anxiety:                             [ ]Mild [ ]Moderate [ ]Severe  Fatigue:                             [x]Mild [ ]Moderate [ ]Severe  Nausea:                             [ ]Mild [ ]Moderate [ ]Severe  Loss of appetite:              [ ]Mild [ ]Moderate [ ]Severe  Constipation:                    [ ]Mild [ ]Moderate [ ]Severe    Other Symptoms: DIzziness  [ ]All other review of systems negative     Karnofsky Performance Score/Palliative Performance Status Version 2:    40%    http://palliative.info/resource_material/[email or web address].pdf    PHYSICAL EXAM:  Vital Signs Last 24 Hrs  T(C): 36.8 (08 Feb 2019 13:30), Max: 36.9 (07 Feb 2019 20:56)  T(F): 98.3 (08 Feb 2019 13:30), Max: 98.4 (07 Feb 2019 20:56)  HR: 91 (08 Feb 2019 13:30) (89 - 95)  BP: 135/87 (08 Feb 2019 13:30) (130/87 - 137/86)  BP(mean): --  RR: 18 (08 Feb 2019 13:30) (18 - 18)  SpO2: 97% (08 Feb 2019 13:30) (97% - 99%) I&O's Summary    GENERAL:  [x]Alert  [x]Oriented x3   [ ]Lethargic  [ ]Cachexia  [ ]Unarousable  [x]Verbal  [ ]Non-Verbal    Behavioral:   [ ] Anxiety  [ ] Delirium [ ] Agitation [ ] Other    HEENT:  [x]Normal   [ ]Dry mouth   [ ]ET Tube/Trach  [ ]Oral lesions    PULMONARY:   [x]Clear [ ]Tachypnea  [ ]Audible excessive secretions   [ ]Rhonchi        [ ]Right [ ]Left [ ]Bilateral  [ ]Crackles        [ ]Right [ ]Left [ ]Bilateral  [ ]Wheezing     [ ]Right [ ]Left [ ]Bilateral    CARDIOVASCULAR:    [ ]Regular [ ]Irregular [x]Tachy  [ ]Aly [ ]Murmur [ ]Other    GASTROINTESTINAL:  [x]Soft  [ ]Distended   [x]+BS  [ ]Non tender [ ]Tender  [ ]PEG [ ]OGT/ NGT   Last BM:    GENITOURINARY:  [ ]Normal [ ] Incontinent   [ ]Oliguria/Anuria   [ ]Waller    MUSCULOSKELETAL:   [x]Normal   [ ]Weakness  [ ]Bed/Wheelchair bound [ ]Edema    NEUROLOGIC:   [x]No focal deficits  [ ] Cognitive impairment  [ ] Dysphagia [ ]Dysarthria [ ] Paresis [ ]Other      SKIN:   [x]Normal   [ ]Pressure ulcer(s)  [ ]Rash    CRITICAL CARE:  [ ] Shock Present  [ ] Septic [ ] Cardiogenic [ ] Neurologic [ ] Hypovolemic [ ] Vasopressors [ ] Inotropes   [ ] Respiratory failure present  [ ] Acute [ ] Chronic [ ] Hypoxic [ ] Hypercarbic [ ] Other  [ ] Other organ failure     LABS:                        7.2    15.77 )-----------( 401      ( 08 Feb 2019 06:45 )             23.6   02-08    141  |  102  |  19  ----------------------------<  122<H>  3.9   |  23  |  0.48<L>    Ca    8.5      08 Feb 2019 06:45  Phos  2.6     02-08  Mg     2.0     02-08    TPro  6.0  /  Alb  2.0<L>  /  TBili  < 0.2<L>  /  DBili  x   /  AST  77<H>  /  ALT  9   /  AlkPhos  267<H>  02-08  PT/INR - ( 08 Feb 2019 06:45 )   PT: 14.4 SEC;   INR: 1.29     PTT - ( 08 Feb 2019 06:45 )  PTT:26.5 SEC    RADIOLOGY & ADDITIONAL STUDIES:    Protein Calorie Malnutrition Present: [x] yes [ ] no  [ ] PPSV2 < or = 30%  [ ] significant weight loss [x] poor nutritional intake [ ] anasarca [ ] catabolic state Albumin, Serum: 2.0 g/dL (02-08-19 @ 06:45)  Artificial Nutrition [ ]     REFERRALS:   [ ]Chaplaincy  [ ] Hospice  [ ]Child Life  [ ]Social Work  [ ]Case management [ ]Holistic Therapy   Goals of Care Document: Goals of Care Conversation - Personal Advance Directive [GISELA Aguirre] (02-05-19 @ 17:02)      Anselmo Lee, PGY-3 Pager#[phone]  ***To Be Reviewed with Attending

## 2019-02-08 NOTE — PROGRESS NOTE ADULT - PROBLEM SELECTOR PLAN 5
HCP form completed: Daughter (Sandy Puente), named HCP. Co- Adriano Galan named as alternate proxy. Form placed in chart, copy provided to daughter.  -patient deferring AD conversation, can revisit post-procedure as patient has been overwhelmed

## 2019-02-08 NOTE — PROGRESS NOTE ADULT - PROBLEM SELECTOR PLAN 3
- secondary to metastatic breast cancer  - MRI head with >15 metastatic lesions within the brain. Keppra 500mg BID & Decadron 4mg q6h. Discussed w/ neurosurg if ok for pt to be on DVT PPx - given lesions don't appear hemorrhagic, okay to continue Lovenox.  - will need whole brain radiation per radiation oncology. Can be revisited after biopsy/mastectomy.

## 2019-02-08 NOTE — PROGRESS NOTE ADULT - ASSESSMENT
68yoF with poor medical care and no known PMH presents with multiple months of growing, fungating, malodorous right breast mass, fall and dizziness. Likely metastatic breast cancer. Found to be severely anemic on lab work, now more stable. MRI head 2/5 demonstrates many metastatic lesions within the brain. Pt expressing interest in surgical intervention, seems to be leaning towards mastectomy. Otherwise medically stable.

## 2019-02-08 NOTE — PROGRESS NOTE ADULT - PROBLEM SELECTOR PLAN 2
Highly concerning for malignancy with potential spread of disease to lungs/LN/brain but no definitive tissue biopsy at this time.  -mastectomy will provide tissue diagnosis to potentially pursue DMT/RT, no

## 2019-02-08 NOTE — CONSULT NOTE ADULT - ASSESSMENT
Recommendations:  - Janay-operative anesthesia precautions.  - If wheezing noted in the post-operative setting, would recommend using positive pressure with CPAP/BIPAP.  - Full consult to follow. 69 y/o F with right breast mass, multiple pulmonary lesions, mediastinal adenopathy with narrowing of the Bi/right mainstem.    Recommendations:  - Aspiration precautions.   - If unilateral wheezing noted in the post-operative setting, would recommend using positive pressure with CPAP/BIPAP given right sided BI/mainstem narrowing from external compression.   - Mastectomy should ideally help her dyspnea overall given the mass effect.   - Encourage post operative incentive spirometry and early ambulation.   - VTE Px given hypercoagulable state due to underlying inflammatory malignancy.   - Patient is optimized for a surgical intervention from the pulmonary perspective.   - Pulmonary will sign off.     Please call with questions    Alex Sousa MD  Fellow (PGY 6),  Pulmonary & Critical Care Medicine.  Pager - [phone] ([facility])/ [phone] ([place]) 67 y/o F with right breast mass, multiple pulmonary lesions, mediastinal adenopathy with minimal narrowing of the BI/right mainstem.    Recommendations:  - Aspiration precautions.   - If unilateral wheezing noted in the post-operative setting, would recommend using positive pressure with CPAP/BIPAP given right sided BI/mainstem narrowing from external compression.   - Mastectomy should ideally help her dyspnea overall given the mass effect.   - Encourage post operative incentive spirometry and early ambulation.   - VTE Px given hypercoagulable state due to underlying inflammatory malignancy.   - Pre/Post operative incentive spirometry to avoid post-op atelectasis. Pulmonary hygiene.   - Patient is optimized for a surgical intervention from the pulmonary perspective.   - Pulmonary will sign off.     Please call with questions    Alex Sousa MD  Fellow (PGY 6),  Pulmonary & Critical Care Medicine.  Pager - [phone] ([facility])/ [phone] ([place])

## 2019-02-08 NOTE — PROGRESS NOTE ADULT - ATTENDING COMMENTS
Patient seen and examined.  Case discussed with house staff.  Agree with above as edited.   Patient with large fungating breast lesion consistent with metastatic breast cancer with mets to LNs, right adrenal and brain.  Metastatic Breast cancer - Appreciated surgery eval. Plan for palliative mastectomy.  Pre-operative evaluation - Prior to hospitalization patient had a fall on the stairs that she describes as losing her balance. In context of findings on MRI, suspect secondary to cerebellar mets. Prior patient reports able to ambulate 10-20 blocks and 3-4 flights of stairs without CP or dyspnea. RCRI score is 0. Low cardiovascular risk for surgery.   Requested pulmonary eval preoperative given pulmonary mets  Vasogenic edema - c/w decadron  HTN - c/w amlodine  Pain of neoplasm - started prn oxycodone - improved pain but patient was lethargic. Cut dose in half.

## 2019-02-08 NOTE — PROGRESS NOTE ADULT - ATTENDING COMMENTS
Pt seen with resident.  Agree with above. Palliative mastectomy with possible dmt after.  Will follow prn.

## 2019-02-08 NOTE — PROGRESS NOTE ADULT - PROBLEM SELECTOR PLAN 9
DVT PPx: lovenox 40mg QD   Diet: DASH diet  Dispo: inpatient floors, palliative mastectomy next week. PT recommending rehab upon discharge. DVT PPx: lovenox 40mg QD   Diet: DASH diet  Dispo: inpatient floors, palliative mastectomy next week. PT recommending rehab upon discharge.    Pt is medically clear and optimized for surgery. RCRI score 0, corresponding with low cardiac risk. Pulmonology consulted for pulmonary clearance - pt optimized from their perspective and no further pulmonary work up required.

## 2019-02-09 LAB
HCT VFR BLD CALC: 24.8 % — LOW (ref 34.5–45)
HGB BLD-MCNC: 7.4 G/DL — LOW (ref 11.5–15.5)
MCHC RBC-ENTMCNC: 27.9 PG — SIGNIFICANT CHANGE UP (ref 27–34)
MCHC RBC-ENTMCNC: 29.8 % — LOW (ref 32–36)
MCV RBC AUTO: 93.6 FL — SIGNIFICANT CHANGE UP (ref 80–100)
NRBC # FLD: 0.04 K/UL — LOW (ref 25–125)
PLATELET # BLD AUTO: 433 K/UL — HIGH (ref 150–400)
PMV BLD: 10 FL — SIGNIFICANT CHANGE UP (ref 7–13)
RBC # BLD: 2.65 M/UL — LOW (ref 3.8–5.2)
RBC # FLD: 19.1 % — HIGH (ref 10.3–14.5)
WBC # BLD: 12.91 K/UL — HIGH (ref 3.8–10.5)
WBC # FLD AUTO: 12.91 K/UL — HIGH (ref 3.8–10.5)

## 2019-02-09 PROCEDURE — 99232 SBSQ HOSP IP/OBS MODERATE 35: CPT

## 2019-02-09 PROCEDURE — 99232 SBSQ HOSP IP/OBS MODERATE 35: CPT | Mod: GC

## 2019-02-09 RX ADMIN — Medication 4 MILLIGRAM(S): at 06:09

## 2019-02-09 RX ADMIN — LEVETIRACETAM 250 MILLIGRAM(S): 250 TABLET, FILM COATED ORAL at 06:10

## 2019-02-09 RX ADMIN — Medication 1 TABLET(S): at 06:09

## 2019-02-09 RX ADMIN — Medication 4 MILLIGRAM(S): at 00:49

## 2019-02-09 RX ADMIN — Medication 4 MILLIGRAM(S): at 17:41

## 2019-02-09 RX ADMIN — Medication 4 MILLIGRAM(S): at 13:46

## 2019-02-09 RX ADMIN — Medication 1000 MILLIGRAM(S): at 13:47

## 2019-02-09 RX ADMIN — LEVETIRACETAM 250 MILLIGRAM(S): 250 TABLET, FILM COATED ORAL at 17:41

## 2019-02-09 RX ADMIN — Medication 1 TABLET(S): at 17:41

## 2019-02-09 RX ADMIN — ENOXAPARIN SODIUM 40 MILLIGRAM(S): 100 INJECTION SUBCUTANEOUS at 13:46

## 2019-02-09 RX ADMIN — AMLODIPINE BESYLATE 5 MILLIGRAM(S): 2.5 TABLET ORAL at 06:09

## 2019-02-09 NOTE — PROGRESS NOTE ADULT - PROBLEM SELECTOR PLAN 9
DVT PPx: lovenox 40mg QD   Diet: DASH diet  Dispo: inpatient floors, palliative mastectomy next week. PT recommending rehab upon discharge.    Pt is medically clear and optimized for surgery. RCRI score 0, corresponding with low cardiac risk. Pulmonology consulted for pulmonary clearance - pt optimized from their perspective and no further pulmonary work up required.

## 2019-02-09 NOTE — PROGRESS NOTE ADULT - PROBLEM SELECTOR PLAN 2
Presents with R fungating breast mass, + Malodorous, + Drainage found to have likely mets to brain and lungs  - Wound care recs in place  - Surgery recommended palliative mastectomy, which pt wants to proceed with next week  -Per CTA chest, neg for PE though there is mass effect on RUL pulmonary artery. Currently breathing on RA.  - oxycodone 2.5mg q6h prn for pain  - mets to brain, adrenals, lung, skull Presents with R fungating breast mass, + Malodorous, + Drainage found to have likely mets to brain and lungs  - Wound care recs in place  - Palliative mastectomy scheduled for 2/11 w/ surg onc. Pre-op labs ordered. Medical clearance documented below.  -Per CTA chest, neg for PE though there is mass effect on RUL pulmonary artery. Currently breathing on RA.  - oxycodone 2.5mg q6h prn for pain  - mets to brain, adrenals, lung, skull

## 2019-02-09 NOTE — PROGRESS NOTE ADULT - ASSESSMENT
68 year old female with R fungating breast mass and metastatic disease to lung, brain, and bone.    - Will plan for mastectomy w/ plastic surgery closure. No need for biopsy prior to surgery.  - Patient is booked for Monday 2/11  - Will need pre-op labs tomorrow - CBC, BMP, active type & screen, PT/PTT/INR  - NPO after midnight Sunday night   - Pre-op medical optimization noted and appreciated    Surgical Oncology / D team surgery  Pager [phone]

## 2019-02-09 NOTE — PROGRESS NOTE ADULT - SUBJECTIVE AND OBJECTIVE BOX
***************************************************************  Micha Bear MD Pgy-1  Contact/Pager [phone] / [phone]  ***************************************************************    TOMEKA PEREZ  68y  MRN: [id number]      Patient is a 68y old  Female who presents with a chief complaint of Symptomatic anemia, Rt Fungating Breast mass , (08 Feb 2019 15:08)      Subjective/ON Events: No acute events overnight. Pain well controlled. Less drowsy with lower dose of Keppra.      MEDICATIONS  (STANDING):  amLODIPine   Tablet 5 milliGRAM(s) Oral daily  dexamethasone     Tablet 4 milliGRAM(s) Oral every 6 hours  enoxaparin Injectable 40 milliGRAM(s) SubCutaneous daily  influenza   Vaccine 0.5 milliLiter(s) IntraMuscular once  lactobacillus acidophilus 1 Tablet(s) Oral every 12 hours  levETIRAcetam  Solution 250 milliGRAM(s) Oral two times a day  metroNIDAZOLE    Tablet 1000 milliGRAM(s) Oral daily  sodium chloride 0.9%. 1000 milliLiter(s) (100 mL/Hr) IV Continuous <Continuous>    MEDICATIONS  (PRN):  acetaminophen   Tablet .. 650 milliGRAM(s) Oral every 6 hours PRN Mild Pain (1 - 3), Moderate Pain (4 - 6)  oxyCODONE    IR 2.5 milliGRAM(s) Oral every 6 hours PRN Severe Pain (7 - 10)        Objective:    Vitals: Vital Signs Last 24 Hrs  T(C): 37.1 (02-09-19 @ 05:21), Max: 37.1 (02-08-19 @ 22:22)  T(F): 98.7 (02-09-19 @ 05:21), Max: 98.7 (02-08-19 @ 22:22)  HR: 87 (02-09-19 @ 05:21) (87 - 91)  BP: 140/90 (02-09-19 @ 05:21) (135/87 - 140/90)  RR: 18 (02-09-19 @ 05:21) (18 - 18)  SpO2: 98% (02-09-19 @ 05:21) (97% - 100%)                  PHYSICAL EXAM:  GENERAL: NAD  HEAD:  Atraumatic  EYES: EOMI, PERRLA, conjunctiva and sclera clear  NECK: Supple, No JVD  CHEST/LUNG: Clear to auscultation bilaterally, poor inspiratory effort; No wheeze, on RA.  HEART: Regular rate and rhythm; No murmurs, rubs, or gallops  ABDOMEN: Soft, Nontender, Nondistended; Bowel sounds present  EXTREMITIES:  2+ Peripheral Pulses, No clubbing, cyanosis, or edema  NEURO/PSYCH: AAOx3, nonfocal  SKIN: No rashes. Ulcerated fungating breast mass                                          LABS:  02-08    141  |  102  |  19  ----------------------------<  122<H>  3.9   |  23  |  0.48<L>  02-07    140  |  100  |  10  ----------------------------<  122<H>  3.8   |  25  |  0.43<L>    Ca    8.5      08 Feb 2019 06:45  Ca    8.5      07 Feb 2019 06:35  Phos  2.6     02-08  Mg     2.0     02-08    TPro  6.0  /  Alb  2.0<L>  /  TBili  < 0.2<L>  /  DBili  x   /  AST  77<H>  /  ALT  9   /  AlkPhos  267<H>  02-08      PT/INR - ( 08 Feb 2019 06:45 )   PT: 14.4 SEC;   INR: 1.29        PTT - ( 08 Feb 2019 06:45 )  PTT:26.5 SEC                                    7.2    15.77 )-----------( 401      ( 08 Feb 2019 06:45 )             23.6

## 2019-02-09 NOTE — PROGRESS NOTE ADULT - PROBLEM SELECTOR PLAN 1
Patient wants daughter (Sandy) and co- (Adriano Galan) to be her HCP  -Full code. Has capacity. Patient is  in [place], has strong mariola that God will heal her  - Palliative care on board  - Had GOC conversation 2/5 (see GOC note). Pt would like to proceed with palliative mastectomy. Surg onc team on board, pt will likely have surgery early next week. Pt still ambivalent about speaking with medical oncology.  - currently agrees to blood transfusions.   - will continue to have ongoing GOC discussions w/ pt & HCPs Patient wants daughter (Sandy) and co- (Adriano Galan) to be her HCP  -Full code. Has capacity. Patient is  in [employer], has strong mariola that God will heal her  - Palliative care on board  - Had GOC conversation 2/5 (see GOC note). Pt would like to proceed with palliative mastectomy, scheduled for Monday 2/11. Pt still ambivalent about speaking with medical oncology.  - currently agrees to blood transfusions.   - will continue to have ongoing GOC discussions w/ pt & HCPs

## 2019-02-09 NOTE — PROGRESS NOTE ADULT - ATTENDING COMMENTS
- I have seen and examined the patient on rounds. Patient's chart, labs, images and reports reviewed.  Plan for OR next week

## 2019-02-09 NOTE — PROGRESS NOTE ADULT - SUBJECTIVE AND OBJECTIVE BOX
Surgical Oncology Progress Note      SUBJECTIVE: Patient seen and examined on morning rounds. No events overnight. No complaints.       OBJECTIVE:     ** Vital signs / I&O's **    Vital Signs Last 24 Hrs  T(C): 37.1 (09 Feb 2019 05:21), Max: 37.1 (08 Feb 2019 22:22)  T(F): 98.7 (09 Feb 2019 05:21), Max: 98.7 (08 Feb 2019 22:22)  HR: 87 (09 Feb 2019 05:21) (87 - 91)  BP: 140/90 (09 Feb 2019 05:21) (135/87 - 140/90)  BP(mean): --  RR: 18 (09 Feb 2019 05:21) (18 - 18)  SpO2: 98% (09 Feb 2019 05:21) (97% - 100%)        ** Physical Exam **  Gen: Alert, NAD  Breast: Dressing dry, intact    ** Labs **                          7.4    12.91 )-----------( 433      ( 09 Feb 2019 07:05 )             24.8     08 Feb 2019 06:45    141    |  102    |  19     ----------------------------<  122    3.9     |  23     |  0.48     Ca    8.5        08 Feb 2019 06:45  Phos  2.6       08 Feb 2019 06:45  Mg     2.0       08 Feb 2019 06:45    TPro  6.0    /  Alb  2.0    /  TBili  < 0.2  /  DBili  x      /  AST  77     /  ALT  9      /  AlkPhos  267    08 Feb 2019 06:45    PT/INR - ( 08 Feb 2019 06:45 )   PT: 14.4 SEC;   INR: 1.29          PTT - ( 08 Feb 2019 06:45 )  PTT:26.5 SEC  CAPILLARY BLOOD GLUCOSE      LIVER FUNCTIONS - ( 08 Feb 2019 06:45 )  Alb: 2.0 g/dL / Pro: 6.0 g/dL / ALK PHOS: 267 u/L / ALT: 9 u/L / AST: 77 u/L / GGT: x             MEDICATIONS  (STANDING):  amLODIPine   Tablet 5 milliGRAM(s) Oral daily  dexamethasone     Tablet 4 milliGRAM(s) Oral every 6 hours  enoxaparin Injectable 40 milliGRAM(s) SubCutaneous daily  influenza   Vaccine 0.5 milliLiter(s) IntraMuscular once  lactobacillus acidophilus 1 Tablet(s) Oral every 12 hours  levETIRAcetam  Solution 250 milliGRAM(s) Oral two times a day  metroNIDAZOLE    Tablet 1000 milliGRAM(s) Oral daily  sodium chloride 0.9%. 1000 milliLiter(s) (100 mL/Hr) IV Continuous <Continuous>    MEDICATIONS  (PRN):  acetaminophen   Tablet .. 650 milliGRAM(s) Oral every 6 hours PRN Mild Pain (1 - 3), Moderate Pain (4 - 6)  oxyCODONE    IR 2.5 milliGRAM(s) Oral every 6 hours PRN Severe Pain (7 - 10)

## 2019-02-10 ENCOUNTER — TRANSCRIPTION ENCOUNTER (OUTPATIENT)
Age: 69
End: 2019-02-10

## 2019-02-10 LAB
ANION GAP SERPL CALC-SCNC: 19 MMO/L — HIGH (ref 7–14)
APTT BLD: 25.4 SEC — LOW (ref 27.5–36.3)
BLD GP AB SCN SERPL QL: NEGATIVE — SIGNIFICANT CHANGE UP
BUN SERPL-MCNC: 21 MG/DL — SIGNIFICANT CHANGE UP (ref 7–23)
CALCIUM SERPL-MCNC: 9.1 MG/DL — SIGNIFICANT CHANGE UP (ref 8.4–10.5)
CHLORIDE SERPL-SCNC: 106 MMOL/L — SIGNIFICANT CHANGE UP (ref 98–107)
CO2 SERPL-SCNC: 24 MMOL/L — SIGNIFICANT CHANGE UP (ref 22–31)
CREAT SERPL-MCNC: 0.42 MG/DL — LOW (ref 0.5–1.3)
GLUCOSE SERPL-MCNC: 117 MG/DL — HIGH (ref 70–99)
HCT VFR BLD CALC: 27.1 % — LOW (ref 34.5–45)
HGB BLD-MCNC: 8.1 G/DL — LOW (ref 11.5–15.5)
INR BLD: 1.19 — HIGH (ref 0.88–1.17)
MAGNESIUM SERPL-MCNC: 2.3 MG/DL — SIGNIFICANT CHANGE UP (ref 1.6–2.6)
MCHC RBC-ENTMCNC: 27.7 PG — SIGNIFICANT CHANGE UP (ref 27–34)
MCHC RBC-ENTMCNC: 29.9 % — LOW (ref 32–36)
MCV RBC AUTO: 92.8 FL — SIGNIFICANT CHANGE UP (ref 80–100)
NRBC # FLD: 0.09 K/UL — LOW (ref 25–125)
PHOSPHATE SERPL-MCNC: 1.9 MG/DL — LOW (ref 2.5–4.5)
PLATELET # BLD AUTO: 476 K/UL — HIGH (ref 150–400)
PMV BLD: 9.8 FL — SIGNIFICANT CHANGE UP (ref 7–13)
POTASSIUM SERPL-MCNC: 3.8 MMOL/L — SIGNIFICANT CHANGE UP (ref 3.5–5.3)
POTASSIUM SERPL-SCNC: 3.8 MMOL/L — SIGNIFICANT CHANGE UP (ref 3.5–5.3)
PROTHROM AB SERPL-ACNC: 13.3 SEC — HIGH (ref 9.8–13.1)
RBC # BLD: 2.92 M/UL — LOW (ref 3.8–5.2)
RBC # FLD: 19 % — HIGH (ref 10.3–14.5)
RH IG SCN BLD-IMP: POSITIVE — SIGNIFICANT CHANGE UP
SODIUM SERPL-SCNC: 149 MMOL/L — HIGH (ref 135–145)
WBC # BLD: 15.39 K/UL — HIGH (ref 3.8–10.5)
WBC # FLD AUTO: 15.39 K/UL — HIGH (ref 3.8–10.5)

## 2019-02-10 PROCEDURE — 99232 SBSQ HOSP IP/OBS MODERATE 35: CPT | Mod: 57

## 2019-02-10 PROCEDURE — 71045 X-RAY EXAM CHEST 1 VIEW: CPT | Mod: 26

## 2019-02-10 PROCEDURE — 99232 SBSQ HOSP IP/OBS MODERATE 35: CPT

## 2019-02-10 RX ORDER — SODIUM CHLORIDE 9 MG/ML
1000 INJECTION, SOLUTION INTRAVENOUS
Qty: 0 | Refills: 0 | Status: DISCONTINUED | OUTPATIENT
Start: 2019-02-10 | End: 2019-02-11

## 2019-02-10 RX ADMIN — Medication 4 MILLIGRAM(S): at 05:35

## 2019-02-10 RX ADMIN — Medication 1 TABLET(S): at 19:05

## 2019-02-10 RX ADMIN — SODIUM CHLORIDE 75 MILLILITER(S): 9 INJECTION, SOLUTION INTRAVENOUS at 14:43

## 2019-02-10 RX ADMIN — Medication 1000 MILLIGRAM(S): at 14:18

## 2019-02-10 RX ADMIN — Medication 4 MILLIGRAM(S): at 00:01

## 2019-02-10 RX ADMIN — Medication 1 TABLET(S): at 05:35

## 2019-02-10 RX ADMIN — Medication 4 MILLIGRAM(S): at 23:36

## 2019-02-10 RX ADMIN — Medication 4 MILLIGRAM(S): at 19:05

## 2019-02-10 RX ADMIN — AMLODIPINE BESYLATE 5 MILLIGRAM(S): 2.5 TABLET ORAL at 05:35

## 2019-02-10 RX ADMIN — SODIUM CHLORIDE 75 MILLILITER(S): 9 INJECTION, SOLUTION INTRAVENOUS at 23:38

## 2019-02-10 RX ADMIN — LEVETIRACETAM 250 MILLIGRAM(S): 250 TABLET, FILM COATED ORAL at 05:41

## 2019-02-10 NOTE — PROGRESS NOTE ADULT - ASSESSMENT
68 year old female with R fungating breast mass and metastatic disease to lung, brain, and bone.    PLAN:  - plan for R. total mastectomy w/ plastic surgery closure tomorrow 2/11  - f/u CBC, BMP, active type & screen, PT/PTT/INR  - NPO after midnight    - Pre-op medical optimization noted and appreciated    Surgical Oncology / D team surgery  Pager [phone]

## 2019-02-10 NOTE — PROGRESS NOTE ADULT - SUBJECTIVE AND OBJECTIVE BOX
For Night coverage 7pm-7am: NS- page [phone] Team1-3, page [phone] Team4 & Care Model  Sat/Up Cross Coverage 12pm-7pm: NS- page [phone] for Team1-4, DARIO- pager forwarded to covering Resident    CONTACT INFO:  WANG Tuttle MD  Internal Medicine PGY1  Pager: [phone]/ [phone]    ZULY PEREZDENA  68y  Female    Patient is a 68y old  Female who presents with a chief complaint of Symptomatic anemia, Rt Fungating Breast mass , (10 Feb 2019 10:17)    INTERVAL HPI / OVERNIGHT EVENTS: No acute events overnight. Patient seen and evaluated at bedside. No fever/chills.   OBJECTIVE:  Vitals Signs (24 Hrs):  T(C): 36.3 (02-10-19 @ 05:05), Max: 36.6 (02-09-19 @ 15:25)  HR: 82 (02-10-19 @ 05:05) (82 - 96)  BP: 150/98 (02-10-19 @ 05:05) (131/95 - 150/98)  RR: 20 (02-10-19 @ 05:05) (19 - 20)  SpO2: 100% (02-10-19 @ 05:05) (100% - 100%)    MEDICATIONS:  acetaminophen   Tablet .. 650 milliGRAM(s) Oral every 6 hours PRN Mild Pain (1 - 3), Moderate Pain (4 - 6)  amLODIPine   Tablet 5 milliGRAM(s) Oral daily  dexamethasone     Tablet 4 milliGRAM(s) Oral every 6 hours  enoxaparin Injectable 40 milliGRAM(s) SubCutaneous daily  influenza   Vaccine 0.5 milliLiter(s) IntraMuscular once  lactobacillus acidophilus 1 Tablet(s) Oral every 12 hours  levETIRAcetam  Solution 250 milliGRAM(s) Oral two times a day  metroNIDAZOLE    Tablet 1000 milliGRAM(s) Oral daily  oxyCODONE    IR 2.5 milliGRAM(s) Oral every 6 hours PRN Severe Pain (7 - 10)  sodium chloride 0.9%. 1000 milliLiter(s) (100 mL/Hr) IV Continuous <Continuous>      PHYSICAL EXAM:  General: Comfortable, no apparent distress  HEENT: Atraumatic; EOMI,   Neck: Supple; no JVD;  Chest/Lungs: Clear to auscultation B/L; no rales, rhonchi or wheezing  Heart: Regular rate and rhythm; normal S1/S2; no murmurs, rubs, or gallops  Abdomen: Soft, nontender, nondistended; bowel sounds present  Extremities: PT/DP pulses 2+ B/L; no LE edema  Skin: No rashes or lesions  Neurological: A&O x3    LABS:                        8.1    15.39 )-----------( 476      ( 10 Feb 2019 05:50 )             27.1     02-10    149<H>  |  106  |  21  ----------------------------<  117<H>  3.8   |  24  |  0.42<L>    Ca    9.1      10 Feb 2019 05:50  Phos  1.9     02-10  Mg     2.3     02-10      PT/INR - ( 10 Feb 2019 05:50 )   PT: 13.3 SEC;   INR: 1.19          PTT - ( 10 Feb 2019 05:50 )  PTT:25.4 SEC    CAPILLARY BLOOD GLUCOSE            RADIOLOGY & ADDITIONAL TESTS:    ALLERGIES:  No Known Allergies      Labs and imaging personally reviewed and interpreted [  ]  Consult notes reviewed   Case discussed with consultants For Night coverage 7pm-7am: NS- page 1443 Team1-3, page 1446 Team4 & Care Model  Sat/Up Cross Coverage 12pm-7pm: NS- page 1443 for Team1-4, DARIO- pager forwarded to covering Resident    CONTACT INFO:  WANG Tuttle MD  Internal Medicine PGY1  Pager: [phone]/ [phone]    ZULY PEREZDENA  68y  Female    Patient is a 68y old  Female who presents with a chief complaint of Symptomatic anemia, Rt Fungating Breast mass , (10 Feb 2019 10:17)    INTERVAL HPI / OVERNIGHT EVENTS: No acute events overnight. Patient seen and evaluated at bedside. No fever/chills. No chest pain   OBJECTIVE:  Vitals Signs (24 Hrs):  T(C): 36.3 (02-10-19 @ 05:05), Max: 36.6 (02-09-19 @ 15:25)  HR: 82 (02-10-19 @ 05:05) (82 - 96)  BP: 150/98 (02-10-19 @ 05:05) (131/95 - 150/98)  RR: 20 (02-10-19 @ 05:05) (19 - 20)  SpO2: 100% (02-10-19 @ 05:05) (100% - 100%)    MEDICATIONS:  acetaminophen   Tablet .. 650 milliGRAM(s) Oral every 6 hours PRN Mild Pain (1 - 3), Moderate Pain (4 - 6)  amLODIPine   Tablet 5 milliGRAM(s) Oral daily  dexamethasone     Tablet 4 milliGRAM(s) Oral every 6 hours  enoxaparin Injectable 40 milliGRAM(s) SubCutaneous daily  influenza   Vaccine 0.5 milliLiter(s) IntraMuscular once  lactobacillus acidophilus 1 Tablet(s) Oral every 12 hours  levETIRAcetam  Solution 250 milliGRAM(s) Oral two times a day  metroNIDAZOLE    Tablet 1000 milliGRAM(s) Oral daily  oxyCODONE    IR 2.5 milliGRAM(s) Oral every 6 hours PRN Severe Pain (7 - 10)  sodium chloride 0.9%. 1000 milliLiter(s) (100 mL/Hr) IV Continuous <Continuous>      PHYSICAL EXAM:  General: Comfortable, no apparent distress  HEENT: Atraumatic; EOMI,   Neck: Supple; no JVD;  Chest/Lungs: Clear to auscultation B/L; no rales, rhonchi or wheezing  Heart: Regular rate and rhythm; normal S1/S2; no murmurs, rubs, or gallops  Abdomen: Soft, nontender, nondistended; bowel sounds present  Extremities: PT/DP pulses 2+ B/L; no LE edema  Skin: Ulcerated breast mass  Neurological: A&O x3    LABS:                        8.1    15.39 )-----------( 476      ( 10 Feb 2019 05:50 )             27.1     02-10    149<H>  |  106  |  21  ----------------------------<  117<H>  3.8   |  24  |  0.42<L>    Ca    9.1      10 Feb 2019 05:50  Phos  1.9     02-10  Mg     2.3     02-10      PT/INR - ( 10 Feb 2019 05:50 )   PT: 13.3 SEC;   INR: 1.19          PTT - ( 10 Feb 2019 05:50 )  PTT:25.4 SEC    CAPILLARY BLOOD GLUCOSE            RADIOLOGY & ADDITIONAL TESTS:    ALLERGIES:  No Known Allergies      Labs and imaging personally reviewed and interpreted [  ]  Consult notes reviewed   Case discussed with consultants

## 2019-02-10 NOTE — PROGRESS NOTE ADULT - SUBJECTIVE AND OBJECTIVE BOX
General Surgery Progress Note    SUBJECTIVE:  The patient was seen and examined. No acute events overnight. Pain well controlled. Denies n/v, cp, sob, abd pain.    OBJECTIVE:     ** VITAL SIGNS / I&O's **    Vital Signs Last 24 Hrs  T(C): 36.3 (10 Feb 2019 05:05), Max: 36.6 (09 Feb 2019 15:25)  T(F): 97.4 (10 Feb 2019 05:05), Max: 97.8 (09 Feb 2019 15:25)  HR: 82 (10 Feb 2019 05:05) (82 - 96)  BP: 150/98 (10 Feb 2019 05:05) (131/95 - 150/98)  BP(mean): --  RR: 20 (10 Feb 2019 05:05) (19 - 20)  SpO2: 100% (10 Feb 2019 05:05) (100% - 100%)        ** PHYSICAL EXAM **    -- CONSTITUTIONAL: Alert, NAD  -- PULMONARY: non-labored respirations  -- BREAST: Dressing clean and dry, no signs of bleeding    ** LABS **                          8.1    15.39 )-----------( 476      ( 10 Feb 2019 05:50 )             27.1     10 Feb 2019 05:50    149    |  106    |  21     ----------------------------<  117    3.8     |  24     |  0.42     Ca    9.1        10 Feb 2019 05:50  Phos  1.9       10 Feb 2019 05:50  Mg     2.3       10 Feb 2019 05:50      PT/INR - ( 10 Feb 2019 05:50 )   PT: 13.3 SEC;   INR: 1.19          PTT - ( 10 Feb 2019 05:50 )  PTT:25.4 SEC  CAPILLARY BLOOD GLUCOSE                    MEDICATIONS  (STANDING):  amLODIPine   Tablet 5 milliGRAM(s) Oral daily  dexamethasone     Tablet 4 milliGRAM(s) Oral every 6 hours  enoxaparin Injectable 40 milliGRAM(s) SubCutaneous daily  influenza   Vaccine 0.5 milliLiter(s) IntraMuscular once  lactobacillus acidophilus 1 Tablet(s) Oral every 12 hours  levETIRAcetam  Solution 250 milliGRAM(s) Oral two times a day  metroNIDAZOLE    Tablet 1000 milliGRAM(s) Oral daily  sodium chloride 0.9%. 1000 milliLiter(s) (100 mL/Hr) IV Continuous <Continuous>    MEDICATIONS  (PRN):  acetaminophen   Tablet .. 650 milliGRAM(s) Oral every 6 hours PRN Mild Pain (1 - 3), Moderate Pain (4 - 6)  oxyCODONE    IR 2.5 milliGRAM(s) Oral every 6 hours PRN Severe Pain (7 - 10)

## 2019-02-10 NOTE — PROVIDER CONTACT NOTE (OTHER) - RECOMMENDATIONS
Notify MD for further intervention, reinforce risks of not taking medication as prescribed, offer medication at a later time.

## 2019-02-10 NOTE — CHART NOTE - NSCHARTNOTEFT_GEN_A_CORE
Pre-operative Note    - Pre-operative Diagnosis: R fungating breast mass w/ mets to lung, brain, and bone.  - Procedure: R. total mastectomy  - Surgeon: Mary Lou    - Labs:                        8.1    15.39 )-----------( 476      ( 10 Feb 2019 05:50 )             27.1     02-10    149<H>  |  106  |  21  ----------------------------<  117<H>  3.8   |  24  |  0.42<L>    Ca    9.1      10 Feb 2019 05:50  Phos  1.9     02-10  Mg     2.3     02-10      PT/INR - ( 10 Feb 2019 05:50 )   PT: 13.3 SEC;   INR: 1.19     PTT - ( 10 Feb 2019 05:50 )  PTT:25.4 SEC      -Type & Screen #1&2: on file  - CXR: ordered  - EKG: on file      - Orders:  > NPO at midnight  > IVF at midnight  > Morning Labs: CBC, BMP, coags      - Permits:  > Consent for R. total mastectomy to be obtained.  > Case scheduled with OR. Pre-operative Note    - Pre-operative Diagnosis: R fungating breast mass w/ mets to lung, brain, and bone.  - Procedure: R. total mastectomy  - Surgeon: Mary Lou    - Labs:                        8.1    15.39 )-----------( 476      ( 10 Feb 2019 05:50 )             27.1     02-10    149<H>  |  106  |  21  ----------------------------<  117<H>  3.8   |  24  |  0.42<L>    Ca    9.1      10 Feb 2019 05:50  Phos  1.9     02-10  Mg     2.3     02-10      PT/INR - ( 10 Feb 2019 05:50 )   PT: 13.3 SEC;   INR: 1.19     PTT - ( 10 Feb 2019 05:50 )  PTT:25.4 SEC      -Type & Screen #1&2: on file  - CXR: ordered  - EKG: on file      - Orders:  > NPO at midnight  > IVF at midnight  > Morning Labs: CBC, BMP, coags      - Permits:  > Consent on file  > Case scheduled with OR.

## 2019-02-10 NOTE — PROGRESS NOTE ADULT - ATTENDING COMMENTS
- I have seen and examined the patient on rounds. Patient's chart, labs, images and reports reviewed.  Plan for OR- toilet mastectomy next week  - I have discussed the diagnosis with the patient in detail. Patient expressed verbal understanding and willingness to proceed with proposed plan. All questions answered  -Risks, benefits, alternatives, complications including but not limited to bleeding, infection, injury to adjacent structures, need for further procedures explained to patient in detail. Patient expressed verbal understanding and willingness to proceed with proposed plan.

## 2019-02-10 NOTE — CHART NOTE - NSCHARTNOTEFT_GEN_A_CORE
Notified by RN that patient refusing Decadron. Spoke with patient and daughter at bedside to confirm understanding of disease process and indication for Decadron. Explained risk of cerebral edema/increased ICP from demonstrated metastatic brain lesions and potential consequences. Notified by RN that patient refusing Decadron. Spoke with patient and daughter at bedside to confirm understanding of disease process and indication for Decadron. Patient reports medications are making her lethargic and confused however daughter ntoes patient appears alert and oriented at this time. Explained risk of cerebral edema/increased ICP from demonstrated metastatic brain lesions and potential consequences in detail. Patient now agreeable to next schedule dose of Decadron.    YAZ Goode PGY3  Dept. Internal Medicine  Pager #[phone] Notified by RN that patient refusing Decadron. Spoke with patient and daughter at bedside to confirm understanding of disease process and indication for Decadron. Patient reports medications are making her lethargic and confused however daughter ntoes patient appears alert and oriented at this time. Explained risk of cerebral edema/increased ICP from demonstrated metastatic brain lesions and potential consequences in detail. Patient now agreeable to next schedule dose of Decadron.    YAZ Goode PGY3  Dept. Internal Medicine  Cross Covering Resident - Team 4  Pager #[phone]

## 2019-02-10 NOTE — PROVIDER CONTACT NOTE (OTHER) - ASSESSMENT
Patient is alert and oriented x4, in no apparent distress. Medication indication and importance of taking medication discussed with patient. Patient verbalized understanding but refused to take pill.

## 2019-02-10 NOTE — PROGRESS NOTE ADULT - PROBLEM SELECTOR PLAN 2
Presents with R fungating breast mass, + Malodorous, + Drainage found to have likely mets to brain and lungs  - Wound care recs in place  - Palliative mastectomy scheduled for 2/11 w/ surg onc. Pre-op labs ordered. Medical clearance documented below.  -Per CTA chest, neg for PE though there is mass effect on RUL pulmonary artery. Currently breathing on RA.  - oxycodone 2.5mg q6h prn for pain  - mets to brain, adrenals, lung, skull - Presents with R fungating breast mass, + Malodorous, + Drainage found to have likely mets to brain and lungs  - Wound care recs in place  - mets to brain, adrenals, lung, .  -Per CTA chest, neg for PE though there is mass effect on RUL pulmonary artery. Currently breathing on RA.  - oxycodone 2.5mg q6h prn for pain  - Palliative mastectomy scheduled for 2/11 w/ surg onc. Pre-op labs ordered. Medical clearance documented below

## 2019-02-10 NOTE — PROGRESS NOTE ADULT - PROBLEM SELECTOR PLAN 1
Patient wants daughter (Sandy) and co- (Adriano Galan) to be her HCP  -Full code. Has capacity. Patient is  in Yazidism, has strong mariola that God will heal her  - Palliative care on board  - Had GOC conversation 2/5 (see GOC note). Pt would like to proceed with palliative mastectomy, scheduled for Monday 2/11. Pt still ambivalent about speaking with medical oncology.  - currently agrees to blood transfusions.   - will continue to have ongoing GOC discussions w/ pt & HCPs - Patient wants daughter (Sandy) and co- (Adriano Galan) to be her HCP  -Full code. Has capacity. Patient is  in Taoism, has strong mariola that God will heal her  - Palliative care on board  - Had GOC conversation 2/5 (see GOC note). Pt would like to proceed with palliative mastectomy, scheduled for Monday 2/11. Pt still ambivalent about speaking with medical oncology.  - currently agrees to blood transfusions.   - will continue to have ongoing GOC discussions w/ pt & HCPs

## 2019-02-11 ENCOUNTER — RESULT REVIEW (OUTPATIENT)
Age: 69
End: 2019-02-11

## 2019-02-11 ENCOUNTER — APPOINTMENT (OUTPATIENT)
Dept: SURGICAL ONCOLOGY | Facility: HOSPITAL | Age: 69
End: 2019-02-11

## 2019-02-11 LAB
ANION GAP SERPL CALC-SCNC: 18 MMO/L — HIGH (ref 7–14)
APTT BLD: 24.3 SEC — LOW (ref 27.5–36.3)
BASE EXCESS BLDA CALC-SCNC: -0.8 MMOL/L — SIGNIFICANT CHANGE UP
BUN SERPL-MCNC: 16 MG/DL — SIGNIFICANT CHANGE UP (ref 7–23)
CA-I BLDA-SCNC: 1.21 MMOL/L — SIGNIFICANT CHANGE UP (ref 1.15–1.29)
CALCIUM SERPL-MCNC: 8.8 MG/DL — SIGNIFICANT CHANGE UP (ref 8.4–10.5)
CHLORIDE SERPL-SCNC: 100 MMOL/L — SIGNIFICANT CHANGE UP (ref 98–107)
CO2 SERPL-SCNC: 23 MMOL/L — SIGNIFICANT CHANGE UP (ref 22–31)
CREAT SERPL-MCNC: 0.36 MG/DL — LOW (ref 0.5–1.3)
GAS PNL BLDV: 138 MMOL/L — SIGNIFICANT CHANGE UP (ref 136–146)
GLUCOSE BLDA-MCNC: 122 MG/DL — HIGH (ref 70–99)
GLUCOSE BLDV-MCNC: 140 — HIGH (ref 70–99)
GLUCOSE SERPL-MCNC: 129 MG/DL — HIGH (ref 70–99)
HCO3 BLDA-SCNC: 24 MMOL/L — SIGNIFICANT CHANGE UP (ref 22–26)
HCT VFR BLD CALC: 25.3 % — LOW (ref 34.5–45)
HCT VFR BLD CALC: 27 % — LOW (ref 34.5–45)
HCT VFR BLDA CALC: 23 % — LOW (ref 34.5–46.5)
HCT VFR BLDV CALC: 28.8 % — LOW (ref 34.5–45)
HGB BLD-MCNC: 7.8 G/DL — LOW (ref 11.5–15.5)
HGB BLD-MCNC: 8.1 G/DL — LOW (ref 11.5–15.5)
HGB BLDA-MCNC: 7.4 G/DL — LOW (ref 11.5–15.5)
INR BLD: 1.1 — SIGNIFICANT CHANGE UP (ref 0.88–1.17)
MAGNESIUM SERPL-MCNC: 2.2 MG/DL — SIGNIFICANT CHANGE UP (ref 1.6–2.6)
MCHC RBC-ENTMCNC: 28.4 PG — SIGNIFICANT CHANGE UP (ref 27–34)
MCHC RBC-ENTMCNC: 28.8 PG — SIGNIFICANT CHANGE UP (ref 27–34)
MCHC RBC-ENTMCNC: 30 % — LOW (ref 32–36)
MCHC RBC-ENTMCNC: 30.8 % — LOW (ref 32–36)
MCV RBC AUTO: 93.4 FL — SIGNIFICANT CHANGE UP (ref 80–100)
MCV RBC AUTO: 94.7 FL — SIGNIFICANT CHANGE UP (ref 80–100)
NRBC # FLD: 0.09 K/UL — LOW (ref 25–125)
NRBC # FLD: 0.09 K/UL — LOW (ref 25–125)
PCO2 BLDA: 36 MMHG — SIGNIFICANT CHANGE UP (ref 32–48)
PH BLDA: 7.42 PH — SIGNIFICANT CHANGE UP (ref 7.35–7.45)
PHOSPHATE SERPL-MCNC: 2 MG/DL — LOW (ref 2.5–4.5)
PLATELET # BLD AUTO: 351 K/UL — SIGNIFICANT CHANGE UP (ref 150–400)
PLATELET # BLD AUTO: 377 K/UL — SIGNIFICANT CHANGE UP (ref 150–400)
PMV BLD: 10.1 FL — SIGNIFICANT CHANGE UP (ref 7–13)
PMV BLD: 10.3 FL — SIGNIFICANT CHANGE UP (ref 7–13)
PO2 BLDA: 408 MMHG — HIGH (ref 83–108)
POTASSIUM BLDA-SCNC: 3.6 MMOL/L — SIGNIFICANT CHANGE UP (ref 3.4–4.5)
POTASSIUM BLDV-SCNC: 3.9 MMOL/L — SIGNIFICANT CHANGE UP (ref 3.4–4.5)
POTASSIUM SERPL-MCNC: 3.9 MMOL/L — SIGNIFICANT CHANGE UP (ref 3.5–5.3)
POTASSIUM SERPL-SCNC: 3.9 MMOL/L — SIGNIFICANT CHANGE UP (ref 3.5–5.3)
PROTHROM AB SERPL-ACNC: 12.6 SEC — SIGNIFICANT CHANGE UP (ref 9.8–13.1)
RBC # BLD: 2.71 M/UL — LOW (ref 3.8–5.2)
RBC # BLD: 2.85 M/UL — LOW (ref 3.8–5.2)
RBC # FLD: 19.3 % — HIGH (ref 10.3–14.5)
RBC # FLD: 19.5 % — HIGH (ref 10.3–14.5)
SAO2 % BLDA: 100 % — HIGH (ref 95–99)
SODIUM BLDA-SCNC: 138 MMOL/L — SIGNIFICANT CHANGE UP (ref 136–146)
SODIUM SERPL-SCNC: 141 MMOL/L — SIGNIFICANT CHANGE UP (ref 135–145)
WBC # BLD: 19.12 K/UL — HIGH (ref 3.8–10.5)
WBC # BLD: 19.36 K/UL — HIGH (ref 3.8–10.5)
WBC # FLD AUTO: 19.12 K/UL — HIGH (ref 3.8–10.5)
WBC # FLD AUTO: 19.36 K/UL — HIGH (ref 3.8–10.5)

## 2019-02-11 PROCEDURE — 88360 TUMOR IMMUNOHISTOCHEM/MANUAL: CPT | Mod: 26

## 2019-02-11 PROCEDURE — 88305 TISSUE EXAM BY PATHOLOGIST: CPT | Mod: 26

## 2019-02-11 PROCEDURE — 99233 SBSQ HOSP IP/OBS HIGH 50: CPT | Mod: GC

## 2019-02-11 PROCEDURE — 19305 MAST RADICAL: CPT

## 2019-02-11 RX ORDER — OXYCODONE HYDROCHLORIDE 5 MG/1
5 TABLET ORAL EVERY 6 HOURS
Refills: 0 | Status: DISCONTINUED | OUTPATIENT
Start: 2019-02-11 | End: 2019-02-13

## 2019-02-11 RX ORDER — OXYCODONE HYDROCHLORIDE 5 MG/1
2.5 TABLET ORAL EVERY 6 HOURS
Refills: 0 | Status: DISCONTINUED | OUTPATIENT
Start: 2019-02-11 | End: 2019-02-13

## 2019-02-11 RX ORDER — SODIUM CHLORIDE 9 MG/ML
1000 INJECTION, SOLUTION INTRAVENOUS
Qty: 0 | Refills: 0 | Status: DISCONTINUED | OUTPATIENT
Start: 2019-02-11 | End: 2019-02-16

## 2019-02-11 RX ORDER — ONDANSETRON 8 MG/1
4 TABLET, FILM COATED ORAL ONCE
Qty: 0 | Refills: 0 | Status: DISCONTINUED | OUTPATIENT
Start: 2019-02-11 | End: 2019-02-11

## 2019-02-11 RX ORDER — OXYCODONE HYDROCHLORIDE 5 MG/1
10 TABLET ORAL EVERY 6 HOURS
Qty: 0 | Refills: 0 | Status: DISCONTINUED | OUTPATIENT
Start: 2019-02-11 | End: 2019-02-12

## 2019-02-11 RX ORDER — FENTANYL CITRATE 50 UG/ML
25 INJECTION INTRAVENOUS
Qty: 0 | Refills: 0 | Status: DISCONTINUED | OUTPATIENT
Start: 2019-02-11 | End: 2019-02-11

## 2019-02-11 RX ORDER — ACETAMINOPHEN 500 MG
650 TABLET ORAL EVERY 6 HOURS
Qty: 0 | Refills: 0 | Status: DISCONTINUED | OUTPATIENT
Start: 2019-02-11 | End: 2019-02-16

## 2019-02-11 RX ORDER — FENTANYL CITRATE 50 UG/ML
50 INJECTION INTRAVENOUS
Qty: 0 | Refills: 0 | Status: DISCONTINUED | OUTPATIENT
Start: 2019-02-11 | End: 2019-02-11

## 2019-02-11 RX ORDER — OXYCODONE HYDROCHLORIDE 5 MG/1
5 TABLET ORAL EVERY 6 HOURS
Qty: 0 | Refills: 0 | Status: DISCONTINUED | OUTPATIENT
Start: 2019-02-11 | End: 2019-02-11

## 2019-02-11 RX ORDER — CEFAZOLIN SODIUM 1 G
2000 VIAL (EA) INJECTION EVERY 8 HOURS
Qty: 0 | Refills: 0 | Status: DISCONTINUED | OUTPATIENT
Start: 2019-02-11 | End: 2019-02-13

## 2019-02-11 RX ADMIN — Medication 1 TABLET(S): at 05:14

## 2019-02-11 RX ADMIN — ENOXAPARIN SODIUM 40 MILLIGRAM(S): 100 INJECTION SUBCUTANEOUS at 21:25

## 2019-02-11 RX ADMIN — Medication 100 MILLIGRAM(S): at 16:40

## 2019-02-11 RX ADMIN — SODIUM CHLORIDE 75 MILLILITER(S): 9 INJECTION, SOLUTION INTRAVENOUS at 18:07

## 2019-02-11 RX ADMIN — AMLODIPINE BESYLATE 5 MILLIGRAM(S): 2.5 TABLET ORAL at 05:14

## 2019-02-11 RX ADMIN — SODIUM CHLORIDE 75 MILLILITER(S): 9 INJECTION, SOLUTION INTRAVENOUS at 10:45

## 2019-02-11 RX ADMIN — Medication 4 MILLIGRAM(S): at 05:14

## 2019-02-11 RX ADMIN — LEVETIRACETAM 250 MILLIGRAM(S): 250 TABLET, FILM COATED ORAL at 05:14

## 2019-02-11 NOTE — PROGRESS NOTE ADULT - PROBLEM SELECTOR PLAN 1
- Patient wants daughter (Sandy) and co- (Adriano Galan) to be her HCP  -Full code. Has capacity. Patient is  in Religious, has strong mariola that God will heal her  - Palliative care on board  - Had GOC conversation 2/5 (see GOC note). Pt would like to proceed with palliative mastectomy, scheduled for Monday 2/11. Pt still ambivalent about speaking with medical oncology.  - currently agrees to blood transfusions.   - will continue to have ongoing GOC discussions w/ pt & HCPs - Patient wants daughter (Sandy) and co- (Adriano Galan) to be her HCP  -Full code. Has capacity. Patient is  in Gnosticist, has strong mariola that God will heal her  - Palliative care on board  - Had GOC conversation 2/5 (see GOC note). Pt still ambivalent about speaking with medical oncology.  - currently agrees to blood transfusions.   - will continue to have ongoing GOC discussions w/ pt & HCPs

## 2019-02-11 NOTE — CHART NOTE - NSCHARTNOTEFT_GEN_A_CORE
POST-OPERATIVE NOTE    Subjective:  Patient is s/p breast mass total mastectomy and STSG by plastics w/ VAC. Recovering appropriately. Pain well controlled. Denies SOB/CP.    Vital Signs Last 24 Hrs  T(C): 36.3 (11 Feb 2019 11:45), Max: 36.8 (11 Feb 2019 05:12)  T(F): 97.3 (11 Feb 2019 11:45), Max: 98.2 (11 Feb 2019 05:12)  HR: 84 (11 Feb 2019 12:30) (76 - 98)  BP: 119/75 (11 Feb 2019 12:30) (119/72 - 172/107)  BP(mean): 86 (11 Feb 2019 12:30) (83 - 122)  RR: 14 (11 Feb 2019 12:30) (10 - 88)  SpO2: 97% (11 Feb 2019 12:30) (97% - 100%)  I&O's Detail    ceFAZolin   IVPB 2000  amLODIPine   Tablet 5  ceFAZolin   IVPB 2000  enoxaparin Injectable 40    PAST MEDICAL & SURGICAL HISTORY:  No pertinent past medical history  No significant past surgical history        Physical Exam:  General: NAD, resting comfortably in bed  Pulmonary: Nonlabored breathing, no respiratory distress  Cardiovascular: NSR  Chest: R side w/ VAC on good seal, no surrounding erythema.   Extremities: WWP      LABS:                        7.8    19.36 )-----------( 351      ( 11 Feb 2019 10:55 )             25.3     02-11    141  |  100  |  16  ----------------------------<  129<H>  3.9   |  23  |  0.36<L>    Ca    8.8      11 Feb 2019 06:45  Phos  2.0     02-11  Mg     2.2     02-11      PT/INR - ( 11 Feb 2019 06:45 )   PT: 12.6 SEC;   INR: 1.10          PTT - ( 11 Feb 2019 06:45 )  PTT:24.3 SEC  CAPILLARY BLOOD GLUCOSE          Radiology and Additional Studies:    Assessment:  The patient is a 68y Female who is now several hours post-op from a total mastectomy w/ STSG w/ VAC.     Plan:  - Pain control as needed  - DVT ppx  - OOB and ambulating as tolerated  - F/u AM labs  - VAC per PRS

## 2019-02-11 NOTE — PROGRESS NOTE ADULT - SUBJECTIVE AND OBJECTIVE BOX
Noman Aguirre  PGY2 Internal Medicine  Pager [phone] or [phone]    Patient is a 68y old  Female who presents with a chief complaint of Symptomatic anemia, Rt Fungating Breast mass , (10 Feb 2019 10:36)    SUBJECTIVE / OVERNIGHT EVENTS:  No overnight events, no f/n/v/d, pain controlled and scheduled for palliative mastectomy    MEDICATIONS  (STANDING):  amLODIPine   Tablet 5 milliGRAM(s) Oral daily  dexamethasone     Tablet 4 milliGRAM(s) Oral every 6 hours  dextrose 5%. 1000 milliLiter(s) (75 mL/Hr) IV Continuous <Continuous>  enoxaparin Injectable 40 milliGRAM(s) SubCutaneous daily  influenza   Vaccine 0.5 milliLiter(s) IntraMuscular once  lactobacillus acidophilus 1 Tablet(s) Oral every 12 hours  levETIRAcetam  Solution 250 milliGRAM(s) Oral two times a day  metroNIDAZOLE    Tablet 1000 milliGRAM(s) Oral daily    MEDICATIONS  (PRN):  acetaminophen   Tablet .. 650 milliGRAM(s) Oral every 6 hours PRN Mild Pain (1 - 3), Moderate Pain (4 - 6)  oxyCODONE    IR 2.5 milliGRAM(s) Oral every 6 hours PRN Severe Pain (7 - 10)      T(C): 36.8 (02-11-19 @ 05:54), Max: 36.8 (02-11-19 @ 05:12)  HR: 88 (02-11-19 @ 05:12) (88 - 98)  BP: 167/102 (02-11-19 @ 05:54) (133/89 - 167/102)  RR: 88 (02-11-19 @ 05:54) (18 - 88)  SpO2: 99% (02-11-19 @ 05:54) (98% - 99%)  CAPILLARY BLOOD GLUCOSE        I&O's Summary      PHYSICAL EXAM:  GENERAL: NAD  HEAD:  Atraumatic  EYES: EOMI, PERRLA, conjunctiva and sclera clear  NECK: Supple, No JVD  CHEST/LUNG: Clear to auscultation bilaterally, poor inspiratory effort; No wheeze, on RA.  HEART: Regular rate and rhythm; No murmurs, rubs, or gallops  ABDOMEN: Soft, Nontender, Nondistended; Bowel sounds present  EXTREMITIES:  2+ Peripheral Pulses, No clubbing, cyanosis, or edema  NEURO/PSYCH: AAOx3, nonfocal  SKIN: No rashes. Ulcerated fungating breast mass            LABS:                        8.1    15.39 )-----------( 476      ( 10 Feb 2019 05:50 )             27.1     02-10    149<H>  |  106  |  21  ----------------------------<  117<H>  3.8   |  24  |  0.42<L>    Ca    9.1      10 Feb 2019 05:50  Phos  1.9     02-10  Mg     2.3     02-10      PT/INR - ( 10 Feb 2019 05:50 )   PT: 13.3 SEC;   INR: 1.19          PTT - ( 10 Feb 2019 05:50 )  PTT:25.4 SEC          Micro:      RADIOLOGY & ADDITIONAL TESTS:    Imaging Personally Reviewed:    Consultant(s) Notes Reviewed:      Care Discussed with Consultants/Other Providers: Noman Aguirre  PGY2 Internal Medicine  Pager [phone] or [phone]    Patient is a 68y old  Female who presents with a chief complaint of Symptomatic anemia, Rt Fungating Breast mass , (10 Feb 2019 10:36)    SUBJECTIVE / OVERNIGHT EVENTS:  No overnight events, no f/n/v/d, s/p palliative mastectomy this AM    MEDICATIONS  (STANDING):  amLODIPine   Tablet 5 milliGRAM(s) Oral daily  dexamethasone     Tablet 4 milliGRAM(s) Oral every 6 hours  dextrose 5%. 1000 milliLiter(s) (75 mL/Hr) IV Continuous <Continuous>  enoxaparin Injectable 40 milliGRAM(s) SubCutaneous daily  influenza   Vaccine 0.5 milliLiter(s) IntraMuscular once  lactobacillus acidophilus 1 Tablet(s) Oral every 12 hours  levETIRAcetam  Solution 250 milliGRAM(s) Oral two times a day  metroNIDAZOLE    Tablet 1000 milliGRAM(s) Oral daily    MEDICATIONS  (PRN):  acetaminophen   Tablet .. 650 milliGRAM(s) Oral every 6 hours PRN Mild Pain (1 - 3), Moderate Pain (4 - 6)  oxyCODONE    IR 2.5 milliGRAM(s) Oral every 6 hours PRN Severe Pain (7 - 10)      T(C): 36.8 (02-11-19 @ 05:54), Max: 36.8 (02-11-19 @ 05:12)  HR: 88 (02-11-19 @ 05:12) (88 - 98)  BP: 167/102 (02-11-19 @ 05:54) (133/89 - 167/102)  RR: 88 (02-11-19 @ 05:54) (18 - 88)  SpO2: 99% (02-11-19 @ 05:54) (98% - 99%)  CAPILLARY BLOOD GLUCOSE        I&O's Summary      PHYSICAL EXAM:  GENERAL: NAD, resting comfortably, awakes to voice, pleasant  HEAD:  Atraumatic, MMM  EYES: EOMI, PERRLA, conjunctiva and sclera clear  NECK: Supple, No JVD  CHEST/LUNG: R chest with wound vac in place; Clear to auscultation bilaterally, poor inspiratory effort; No wheeze, on RA.  HEART: Regular rate and rhythm; No murmurs, rubs, or gallops  ABDOMEN: Soft, Nontender, Nondistended; Bowel sounds present  EXTREMITIES:  2+ Peripheral Pulses, No clubbing, cyanosis, or edema  NEURO/PSYCH: AAOx3, nonfocal  SKIN: No rashes.     LABS:                        7.8    19.36 )-----------( 351      ( 11 Feb 2019 10:55 )             25.3     02-11    141  |  100  |  16  ----------------------------<  129<H>  3.9   |  23  |  0.36<L>    Ca    8.8      11 Feb 2019 06:45  Phos  2.0     02-11  Mg     2.2     02-11          Micro:      RADIOLOGY & ADDITIONAL TESTS:    Imaging Personally Reviewed:    Consultant(s) Notes Reviewed:      Care Discussed with Consultants/Other Providers:

## 2019-02-11 NOTE — PROGRESS NOTE ADULT - ASSESSMENT
68 year old female with R fungating breast mass and metastatic disease to lung, brain, and bone.    PLAN:  - plan for R. total mastectomy w/ plastic surgery closure today  2/11  - f/u CBC, BMP, active type & screen, PT/PTT/INR  - Pre-op medical optimization noted and appreciated    Surgical Oncology / D team surgery  Pager [phone]

## 2019-02-11 NOTE — PROGRESS NOTE ADULT - SUBJECTIVE AND OBJECTIVE BOX
SURGERY DAILY PROGRESS NOTE:       SUBJECTIVE/ROS: Patient examined at bedside. No acute events overnight. OR today.          MEDICATIONS  (STANDING):  amLODIPine   Tablet 5 milliGRAM(s) Oral daily  ceFAZolin   IVPB 2000 milliGRAM(s) IV Intermittent every 8 hours  enoxaparin Injectable 40 milliGRAM(s) SubCutaneous daily  lactated ringers. 1000 milliLiter(s) (75 mL/Hr) IV Continuous <Continuous>  lactobacillus acidophilus 1 Tablet(s) Oral every 12 hours  levETIRAcetam  Solution 250 milliGRAM(s) Oral two times a day    MEDICATIONS  (PRN):  acetaminophen   Tablet .. 650 milliGRAM(s) Oral every 6 hours PRN Mild Pain (1 - 3)  fentaNYL    Injectable 25 MICROGram(s) IV Push every 5 minutes PRN Moderate Pain (4 - 6)  fentaNYL    Injectable 50 MICROGram(s) IV Push every 10 minutes PRN Severe Pain (7 - 10)  ondansetron Injectable 4 milliGRAM(s) IV Push once PRN Nausea and/or Vomiting  oxyCODONE    IR 5 milliGRAM(s) Oral every 6 hours PRN Moderate Pain (4 - 6)  oxyCODONE    IR 10 milliGRAM(s) Oral every 6 hours PRN Severe Pain (7 - 10)      OBJECTIVE:    Vital Signs Last 24 Hrs  T(C): 36.3 (2019 11:45), Max: 36.8 (2019 05:12)  T(F): 97.3 (2019 11:45), Max: 98.2 (2019 05:12)  HR: 84 (2019 12:15) (76 - 98)  BP: 120/80 (2019 12:15) (119/72 - 172/107)  BP(mean): 88 (2019 12:15) (83 - 122)  RR: 12 (2019 12:15) (10 - 88)  SpO2: 98% (2019 12:15) (97% - 100%)        I&O's Detail      Daily Height in cm: 167.64 (2019 05:54)    Daily Weight in k.5 (2019 05:12)    LABS:                        7.8    19.36 )-----------( 351      ( 2019 10:55 )             25.3     02-11    141  |  100  |  16  ----------------------------<  129<H>  3.9   |  23  |  0.36<L>    Ca    8.8      2019 06:45  Phos  2.0       Mg     2.2     -      PT/INR - ( 2019 06:45 )   PT: 12.6 SEC;   INR: 1.10          PTT - ( 2019 06:45 )  PTT:24.3 SEC        ** PHYSICAL EXAM **    -- CONSTITUTIONAL: Alert, NAD  -- PULMONARY: non-labored respirations  -- BREAST: Dressing clean and dry, no signs of bleeding

## 2019-02-11 NOTE — PROGRESS NOTE ADULT - PROBLEM SELECTOR PLAN 9
DVT PPx: lovenox 40mg QD   Diet: DASH diet  Dispo: inpatient floors, palliative mastectomy next week. PT recommending rehab upon discharge.    Pt is medically clear and optimized for surgery. RCRI score 0, corresponding with low cardiac risk. Pulmonology consulted for pulmonary clearance - pt optimized from their perspective and no further pulmonary work up required. DVT PPx: lovenox 40mg QD   Diet: DASH diet  Dispo: PT recommending rehab upon discharge.

## 2019-02-11 NOTE — PROGRESS NOTE ADULT - PROBLEM SELECTOR PLAN 3
- secondary to metastatic breast cancer  - MRI head with >15 metastatic lesions within the brain. Keppra 500mg BID & Decadron 4mg q6h. Discussed w/ neurosurg if ok for pt to be on DVT PPx - given lesions don't appear hemorrhagic, okay to continue Lovenox.  - will need whole brain radiation per radiation oncology. Can be revisited after biopsy/mastectomy. - secondary to metastatic breast cancer  - MRI head with >15 metastatic lesions within the brain. Keppra 500mg BID & Decadron 4mg q6h. Discussed w/ neurosurg if ok for pt to be on DVT PPx - given lesions don't appear hemorrhagic, okay to continue Lovenox.  - will need whole brain radiation per radiation oncology. Can be revisited as outpt.

## 2019-02-11 NOTE — PROGRESS NOTE ADULT - ATTENDING COMMENTS
68 W p/w R fungating breast mass and constitutional sx. Imaging with brain mets, lung mets, LN mets and adrenal mets; suspect breast cancer as primary tumor. s/p palliative R total mastectomy on 2/11/2019. Patient has firm belief in God and is still weighing option of oncologic evaluation. Discussed with daughter, Sandy, at bedside today who states her mom needs more time to decide.     Pain well-controlled per patient report. Will await surgery recs re: wound vac management and criteria for discharge from post-op perspective. Will then be medically ready for d/c to [facility] with outpatient oncology f/u should patient want that.

## 2019-02-11 NOTE — BRIEF OPERATIVE NOTE - PROCEDURE
<<-----Click on this checkbox to enter Procedure Mastectomy, right, total  02/11/2019    Active  LDEMYAN

## 2019-02-11 NOTE — PROGRESS NOTE ADULT - PROBLEM SELECTOR PLAN 2
- Presents with R fungating breast mass, + Malodorous, + Drainage found to have likely mets to brain and lungs  - Wound care recs in place  - mets to brain, adrenals, lung, .  -Per CTA chest, neg for PE though there is mass effect on RUL pulmonary artery. Currently breathing on RA.  - oxycodone 2.5mg q6h prn for pain  - Palliative mastectomy scheduled for 2/11 w/ surg onc. Pre-op labs ordered. Medical clearance documented below - Presents with R fungating breast mass, + Malodorous, + Drainage found to have likely mets to brain and lungs  -s/p palliative total mastectomy of R breast on 2/11; wound vac in place  - mets to brain, adrenals, lung, .  -Per CTA chest, neg for PE though there is mass effect on RUL pulmonary artery. Currently breathing on RA.  - oxycodone 2.5mg q6h prn for pain  -f/u surg recs

## 2019-02-11 NOTE — BRIEF OPERATIVE NOTE - OPERATION/FINDINGS
Plastic surgery portion of case - reconstruction of R radical mastectomy defect with 15 x 20 STSG from R thigh; Application of VAC sponge.
Fungating breast mass

## 2019-02-12 DIAGNOSIS — M79.604 PAIN IN RIGHT LEG: ICD-10-CM

## 2019-02-12 DIAGNOSIS — K59.00 CONSTIPATION, UNSPECIFIED: ICD-10-CM

## 2019-02-12 LAB
ALBUMIN SERPL ELPH-MCNC: 2 G/DL — LOW (ref 3.3–5)
ALP SERPL-CCNC: 313 U/L — HIGH (ref 40–120)
ALT FLD-CCNC: 8 U/L — SIGNIFICANT CHANGE UP (ref 4–33)
ANION GAP SERPL CALC-SCNC: 13 MMO/L — SIGNIFICANT CHANGE UP (ref 7–14)
AST SERPL-CCNC: 53 U/L — HIGH (ref 4–32)
BASOPHILS # BLD AUTO: 0.04 K/UL — SIGNIFICANT CHANGE UP (ref 0–0.2)
BASOPHILS NFR BLD AUTO: 0.3 % — SIGNIFICANT CHANGE UP (ref 0–2)
BILIRUB SERPL-MCNC: < 0.2 MG/DL — LOW (ref 0.2–1.2)
BLD GP AB SCN SERPL QL: NEGATIVE — SIGNIFICANT CHANGE UP
BUN SERPL-MCNC: 13 MG/DL — SIGNIFICANT CHANGE UP (ref 7–23)
CALCIUM SERPL-MCNC: 8.3 MG/DL — LOW (ref 8.4–10.5)
CHLORIDE SERPL-SCNC: 101 MMOL/L — SIGNIFICANT CHANGE UP (ref 98–107)
CO2 SERPL-SCNC: 26 MMOL/L — SIGNIFICANT CHANGE UP (ref 22–31)
CREAT SERPL-MCNC: 0.35 MG/DL — LOW (ref 0.5–1.3)
EOSINOPHIL # BLD AUTO: 0 K/UL — SIGNIFICANT CHANGE UP (ref 0–0.5)
EOSINOPHIL NFR BLD AUTO: 0 % — SIGNIFICANT CHANGE UP (ref 0–6)
GLUCOSE SERPL-MCNC: 88 MG/DL — SIGNIFICANT CHANGE UP (ref 70–99)
HCT VFR BLD CALC: 27.1 % — LOW (ref 34.5–45)
HGB BLD-MCNC: 7.8 G/DL — LOW (ref 11.5–15.5)
IMM GRANULOCYTES NFR BLD AUTO: 4.3 % — HIGH (ref 0–1.5)
LYMPHOCYTES # BLD AUTO: 1.3 K/UL — SIGNIFICANT CHANGE UP (ref 1–3.3)
LYMPHOCYTES # BLD AUTO: 8.2 % — LOW (ref 13–44)
MAGNESIUM SERPL-MCNC: 2.1 MG/DL — SIGNIFICANT CHANGE UP (ref 1.6–2.6)
MCHC RBC-ENTMCNC: 28.4 PG — SIGNIFICANT CHANGE UP (ref 27–34)
MCHC RBC-ENTMCNC: 28.8 % — LOW (ref 32–36)
MCV RBC AUTO: 98.5 FL — SIGNIFICANT CHANGE UP (ref 80–100)
MONOCYTES # BLD AUTO: 0.57 K/UL — SIGNIFICANT CHANGE UP (ref 0–0.9)
MONOCYTES NFR BLD AUTO: 3.6 % — SIGNIFICANT CHANGE UP (ref 2–14)
NEUTROPHILS # BLD AUTO: 13.27 K/UL — HIGH (ref 1.8–7.4)
NEUTROPHILS NFR BLD AUTO: 83.6 % — HIGH (ref 43–77)
NRBC # FLD: 0.07 K/UL — LOW (ref 25–125)
PHOSPHATE SERPL-MCNC: 1.9 MG/DL — LOW (ref 2.5–4.5)
PLATELET # BLD AUTO: 298 K/UL — SIGNIFICANT CHANGE UP (ref 150–400)
PMV BLD: 10.3 FL — SIGNIFICANT CHANGE UP (ref 7–13)
POTASSIUM SERPL-MCNC: 3.9 MMOL/L — SIGNIFICANT CHANGE UP (ref 3.5–5.3)
POTASSIUM SERPL-SCNC: 3.9 MMOL/L — SIGNIFICANT CHANGE UP (ref 3.5–5.3)
PROT SERPL-MCNC: 5.4 G/DL — LOW (ref 6–8.3)
RBC # BLD: 2.75 M/UL — LOW (ref 3.8–5.2)
RBC # FLD: 19.7 % — HIGH (ref 10.3–14.5)
RH IG SCN BLD-IMP: POSITIVE — SIGNIFICANT CHANGE UP
SODIUM SERPL-SCNC: 140 MMOL/L — SIGNIFICANT CHANGE UP (ref 135–145)
WBC # BLD: 15.87 K/UL — HIGH (ref 3.8–10.5)
WBC # FLD AUTO: 15.87 K/UL — HIGH (ref 3.8–10.5)

## 2019-02-12 PROCEDURE — 99233 SBSQ HOSP IP/OBS HIGH 50: CPT | Mod: GC

## 2019-02-12 RX ORDER — POLYETHYLENE GLYCOL 3350 17 G/17G
17 POWDER, FOR SOLUTION ORAL DAILY
Qty: 0 | Refills: 0 | Status: DISCONTINUED | OUTPATIENT
Start: 2019-02-12 | End: 2019-02-16

## 2019-02-12 RX ORDER — OXYCODONE HYDROCHLORIDE 5 MG/1
2.5 TABLET ORAL EVERY 8 HOURS
Qty: 0 | Refills: 0 | Status: DISCONTINUED | OUTPATIENT
Start: 2019-02-12 | End: 2019-02-13

## 2019-02-12 RX ORDER — SENNA PLUS 8.6 MG/1
2 TABLET ORAL AT BEDTIME
Qty: 0 | Refills: 0 | Status: DISCONTINUED | OUTPATIENT
Start: 2019-02-12 | End: 2019-02-16

## 2019-02-12 RX ADMIN — OXYCODONE HYDROCHLORIDE 2.5 MILLIGRAM(S): 5 TABLET ORAL at 12:49

## 2019-02-12 RX ADMIN — Medication 100 MILLIGRAM(S): at 19:24

## 2019-02-12 RX ADMIN — Medication 1 TABLET(S): at 05:48

## 2019-02-12 RX ADMIN — LEVETIRACETAM 250 MILLIGRAM(S): 250 TABLET, FILM COATED ORAL at 05:49

## 2019-02-12 RX ADMIN — OXYCODONE HYDROCHLORIDE 2.5 MILLIGRAM(S): 5 TABLET ORAL at 11:49

## 2019-02-12 RX ADMIN — Medication 100 MILLIGRAM(S): at 00:44

## 2019-02-12 RX ADMIN — AMLODIPINE BESYLATE 5 MILLIGRAM(S): 2.5 TABLET ORAL at 05:48

## 2019-02-12 RX ADMIN — Medication 100 MILLIGRAM(S): at 11:49

## 2019-02-12 RX ADMIN — ENOXAPARIN SODIUM 40 MILLIGRAM(S): 100 INJECTION SUBCUTANEOUS at 21:20

## 2019-02-12 RX ADMIN — SENNA PLUS 2 TABLET(S): 8.6 TABLET ORAL at 21:23

## 2019-02-12 RX ADMIN — SODIUM CHLORIDE 75 MILLILITER(S): 9 INJECTION, SOLUTION INTRAVENOUS at 07:50

## 2019-02-12 NOTE — PROGRESS NOTE ADULT - SUBJECTIVE AND OBJECTIVE BOX
SUBJECTIVE AND OBJECTIVE  INTERVAL HPI/OVERNIGHT EVENTS:  no acute events overnight. s/p R mastectomy with skin graft from R thigh. reports improvement in dyspnea but pain in her R leg.    DNR on chart: X    Allergies  No Known Allergies    MEDICATIONS  (STANDING):  amLODIPine   Tablet 5 milliGRAM(s) Oral daily  ceFAZolin   IVPB 2000 milliGRAM(s) IV Intermittent every 8 hours  enoxaparin Injectable 40 milliGRAM(s) SubCutaneous daily  lactated ringers. 1000 milliLiter(s) (75 mL/Hr) IV Continuous <Continuous>  lactobacillus acidophilus 1 Tablet(s) Oral every 12 hours  levETIRAcetam  Solution 250 milliGRAM(s) Oral two times a day  oxyCODONE    IR 2.5 milliGRAM(s) Oral every 8 hours    MEDICATIONS  (PRN):  acetaminophen   Tablet .. 650 milliGRAM(s) Oral every 6 hours PRN Mild Pain (1 - 3)  oxyCODONE    IR 5 milliGRAM(s) Oral every 6 hours PRN Severe Pain (7 - 10)  oxyCODONE    IR 2.5 milliGRAM(s) Oral every 6 hours PRN Moderate Pain (4 - 6)      ITEMS UNCHECKED ARE NOT PRESENT    PRESENT SYMPTOMS: [ ]Unable to obtain due to poor mentation   Source if other than patient:  [ ]Family   [ ]Team     Pain (Impact on QOL): Bothersome  Location: R Thigh  Minimal acceptable level (0-10 scale):        Aggravating factors: movement  Quality:   Radiation:  Severity (0-10 scale): 8  Timing: constant    Dyspnea:                           [x]Mild [ ]Moderate [ ]Severe  Anxiety:                             [ ]Mild [ ]Moderate [ ]Severe  Fatigue:                             [ ]Mild [ ]Moderate [ ]Severe  Nausea:                             [ ]Mild [ ]Moderate [ ]Severe  Loss of appetite:              [x]Mild [ ]Moderate [ ]Severe  Constipation:                    [ ]Mild [x]Moderate [ ]Severe    PAIN AD Score:	  http://geriatrictoolkit.[employer].edu/cog/painad.pdf (Ctrl + left click to view)    Other Symptoms:  [x]All other review of systems negative     Karnofsky Performance Score/Palliative Performance Status Version 2:        30%    http://palliative.info/resource_material/PPSv2.pdf    PHYSICAL EXAM:  Vital Signs Last 24 Hrs  T(C): 36.4 (12 Feb 2019 05:15), Max: 36.5 (11 Feb 2019 16:28)  T(F): 97.5 (12 Feb 2019 05:15), Max: 97.7 (11 Feb 2019 16:28)  HR: 93 (12 Feb 2019 05:15) (89 - 93)  BP: 155/97 (12 Feb 2019 05:15) (124/81 - 155/97)  BP(mean): --  RR: 16 (12 Feb 2019 05:15) (15 - 16)  SpO2: 96% (12 Feb 2019 05:15) (96% - 97%) I&O's Summary    11 Feb 2019 07:01  -  12 Feb 2019 07:00  --------------------------------------------------------  IN: 350 mL / OUT: 300 mL / NET: 50 mL    GENERAL:  [x]Alert  [x]Oriented x3   [ ]Lethargic  [ ]Cachexia  [ ]Unarousable  [x]Verbal  [ ]Non-Verbal    Behavioral:   [ ] Anxiety  [ ] Delirium [ ] Agitation [ ] Other    HEENT:  [x]Normal   [ ]Dry mouth   [ ]ET Tube/Trach  [ ]Oral lesions    PULMONARY:   [x]Clear [ ]Tachypnea  [ ]Audible excessive secretions   [ ]Rhonchi        [ ]Right [ ]Left [ ]Bilateral  [ ]Crackles        [ ]Right [ ]Left [ ]Bilateral  [ ]Wheezing     [ ]Right [ ]Left [ ]Bilateral    CARDIOVASCULAR:    [x]Regular [ ]Irregular [ ]Tachy  [ ]Aly [ ]Murmur [ ]Other    GASTROINTESTINAL:  [x]Soft  [ ]Distended   [x]+BS  [ ]Non tender [ ]Tender  [ ]PEG [ ]OGT/ NGT   Last BM:    GENITOURINARY:  [ ]Normal [ ] Incontinent   [ ]Oliguria/Anuria   [ ]Waller    MUSCULOSKELETAL:   [x]Normal   [ ]Weakness  [ ]Bed/Wheelchair bound [ ]Edema    NEUROLOGIC:   [x]No focal deficits  [ ] Cognitive impairment  [ ] Dysphagia [ ]Dysarthria [ ] Paresis [ ]Other      SKIN:   [ ]Normal   [ ]Pressure ulcer(s)  [ ]Rash  [x] R Thigh Wound Vac    CRITICAL CARE:  [ ] Shock Present  [ ] Septic [ ] Cardiogenic [ ] Neurologic [ ] Hypovolemic [ ] Vasopressors [ ] Inotropes   [ ] Respiratory failure present  [ ] Acute [ ] Chronic [ ] Hypoxic [ ] Hypercarbic [ ] Other  [ ] Other organ failure     LABS:                        7.8    15.87 )-----------( 298      ( 12 Feb 2019 06:15 )             27.1   02-12    140  |  101  |  13  ----------------------------<  88  3.9   |  26  |  0.35<L>    Ca    8.3<L>      12 Feb 2019 06:15  Phos  1.9     02-12  Mg     2.1     02-12    TPro  5.4<L>  /  Alb  2.0<L>  /  TBili  < 0.2<L>  /  DBili  x   /  AST  53<H>  /  ALT  8   /  AlkPhos  313<H>  02-12  PT/INR - ( 11 Feb 2019 06:45 )   PT: 12.6 SEC;   INR: 1.10          PTT - ( 11 Feb 2019 06:45 )  PTT:24.3 SEC      RADIOLOGY & ADDITIONAL STUDIES:    Protein Calorie Malnutrition Present: [ ] yes [ ] no  [x] PPSV2 < or = 30%  [ ] significant weight loss [x] poor nutritional intake [ ] anasarca [ ] catabolic state Albumin, Serum: 2.0 g/dL (02-12-19 @ 06:15)  Artificial Nutrition [ ]     REFERRALS:   [ ]Chaplaincy  [ ] Hospice  [ ]Child Life  [ ]Social Work  [ ]Case management [ ]Holistic Therapy   Goals of Care Document: Goals of Care Conversation - Personal Advance Directive [GISELA Aguirre] (02-05-19 @ 17:02)      Anselmo Lee, PGY-3 Pager#[phone]  ***To Be Reviewed with Attending

## 2019-02-12 NOTE — PROGRESS NOTE ADULT - ASSESSMENT
67yo Woman w/ Fungating R Breast Mass presenting with SOB found to have symptomatic anemia in the setting of likely malignancy with imaging showing extensive metastatic lung and brain involvement, s/p palliative mastectomy.

## 2019-02-12 NOTE — PROGRESS NOTE ADULT - SUBJECTIVE AND OBJECTIVE BOX
SURGERY DAILY PROGRESS NOTE:       SUBJECTIVE/ROS: Patient examined at bedside. No acute events overnight. VAC w/ good seal.          MEDICATIONS  (STANDING):  amLODIPine   Tablet 5 milliGRAM(s) Oral daily  ceFAZolin   IVPB 2000 milliGRAM(s) IV Intermittent every 8 hours  enoxaparin Injectable 40 milliGRAM(s) SubCutaneous daily  lactated ringers. 1000 milliLiter(s) (75 mL/Hr) IV Continuous <Continuous>  lactobacillus acidophilus 1 Tablet(s) Oral every 12 hours  levETIRAcetam  Solution 250 milliGRAM(s) Oral two times a day  oxyCODONE    IR 2.5 milliGRAM(s) Oral every 8 hours  senna 2 Tablet(s) Oral at bedtime    MEDICATIONS  (PRN):  acetaminophen   Tablet .. 650 milliGRAM(s) Oral every 6 hours PRN Mild Pain (1 - 3)  oxyCODONE    IR 5 milliGRAM(s) Oral every 6 hours PRN Severe Pain (7 - 10)  oxyCODONE    IR 2.5 milliGRAM(s) Oral every 6 hours PRN Moderate Pain (4 - 6)  polyethylene glycol 3350 17 Gram(s) Oral daily PRN Constipation      OBJECTIVE:    Vital Signs Last 24 Hrs  T(C): 36.4 (2019 05:15), Max: 36.5 (2019 16:28)  T(F): 97.5 (2019 05:15), Max: 97.7 (2019 16:28)  HR: 93 (2019 05:15) (89 - 93)  BP: 155/97 (2019 05:15) (124/81 - 155/97)  BP(mean): --  RR: 16 (2019 05:15) (15 - 16)  SpO2: 96% (2019 05:15) (96% - 97%)        I&O's Detail    2019 07:01  -  2019 07:00  --------------------------------------------------------  IN:    lactated ringers.: 150 mL    Oral Fluid: 200 mL  Total IN: 350 mL    OUT:    Voided: 300 mL  Total OUT: 300 mL    Total NET: 50 mL          Daily     Daily Weight in k.6 (2019 05:15)    LABS:                        7.8    15.87 )-----------( 298      ( 2019 06:15 )             27.1     02    140  |  101  |  13  ----------------------------<  88  3.9   |  26  |  0.35<L>    Ca    8.3<L>      2019 06:15  Phos  1.9       Mg     2.1         TPro  5.4<L>  /  Alb  2.0<L>  /  TBili  < 0.2<L>  /  DBili  x   /  AST  53<H>  /  ALT  8   /  AlkPhos  313<H>      PT/INR - ( 2019 06:45 )   PT: 12.6 SEC;   INR: 1.10          PTT - ( 2019 06:45 )  PTT:24.3 SEC        Physical Exam:  General: NAD, resting comfortably in bed  Pulmonary: Nonlabored breathing, no respiratory distress  Cardiovascular: NSR  Chest: R side w/ VAC on good seal, no surrounding erythema.   Extremities: WWP

## 2019-02-12 NOTE — PROGRESS NOTE ADULT - SUBJECTIVE AND OBJECTIVE BOX
***************************************************************  Micha Bear MD Pgy-1  Contact/Pager [phone] / [phone]  ***************************************************************    TOMEKA PEREZ  68y  MRN: [id number]      Patient is a 68y old  Female who presents with a chief complaint of Symptomatic anemia, Rt Fungating Breast mass , (11 Feb 2019 12:34)      Subjective/ON Events: No acute events overnight      MEDICATIONS  (STANDING):  amLODIPine   Tablet 5 milliGRAM(s) Oral daily  ceFAZolin   IVPB 2000 milliGRAM(s) IV Intermittent every 8 hours  enoxaparin Injectable 40 milliGRAM(s) SubCutaneous daily  lactated ringers. 1000 milliLiter(s) (75 mL/Hr) IV Continuous <Continuous>  lactobacillus acidophilus 1 Tablet(s) Oral every 12 hours  levETIRAcetam  Solution 250 milliGRAM(s) Oral two times a day    MEDICATIONS  (PRN):  acetaminophen   Tablet .. 650 milliGRAM(s) Oral every 6 hours PRN Mild Pain (1 - 3)  oxyCODONE    IR 10 milliGRAM(s) Oral every 6 hours PRN Severe Pain (7 - 10)  oxyCODONE    IR 5 milliGRAM(s) Oral every 6 hours PRN Severe Pain (7 - 10)  oxyCODONE    IR 2.5 milliGRAM(s) Oral every 6 hours PRN Moderate Pain (4 - 6)        Objective:    Vitals: Vital Signs Last 24 Hrs  T(C): 36.4 (02-12-19 @ 05:15), Max: 36.5 (02-11-19 @ 16:28)  T(F): 97.5 (02-12-19 @ 05:15), Max: 97.7 (02-11-19 @ 16:28)  HR: 93 (02-12-19 @ 05:15) (76 - 93)  BP: 155/97 (02-12-19 @ 05:15) (119/72 - 171/106)  BP(mean): 86 (02-11-19 @ 12:30) (83 - 122)  RR: 16 (02-12-19 @ 05:15) (10 - 20)  SpO2: 96% (02-12-19 @ 05:15) (96% - 100%)                  I&O's Summary    11 Feb 2019 07:01  -  12 Feb 2019 07:00  --------------------------------------------------------  IN: 350 mL / OUT: 300 mL / NET: 50 mL        PHYSICAL EXAM:  GENERAL: NAD, resting comfortably, awakes to voice, pleasant  HEAD:  Atraumatic, MMM  EYES: EOMI, PERRLA, conjunctiva and sclera clear  NECK: Supple, No JVD  CHEST/LUNG: R chest with wound vac in place; Clear to auscultation bilaterally, poor inspiratory effort; No wheeze, on RA.  HEART: Regular rate and rhythm; No murmurs, rubs, or gallops  ABDOMEN: Soft, Nontender, Nondistended; Bowel sounds present  EXTREMITIES:  2+ Peripheral Pulses, No clubbing, cyanosis, or edema  NEURO/PSYCH: AAOx3, nonfocal  SKIN: No rashes. Mastectomy site w/ wound vac                                  LABS:  02-11    141  |  100  |  16  ----------------------------<  129<H>  3.9   |  23  |  0.36<L>  02-10    149<H>  |  106  |  21  ----------------------------<  117<H>  3.8   |  24  |  0.42<L>    Ca    8.8      11 Feb 2019 06:45  Ca    9.1      10 Feb 2019 05:50  Phos  2.0     02-11  Mg     2.2     02-11        PT/INR - ( 11 Feb 2019 06:45 )   PT: 12.6 SEC;   INR: 1.10          PTT - ( 11 Feb 2019 06:45 )  PTT:24.3 SEC                ABG - ( 11 Feb 2019 08:24 )  pH, Arterial: 7.42  pH, Blood: x     /  pCO2: 36    /  pO2: 408   / HCO3: 24    / Base Excess: -0.8  /  SaO2: 100                           7.8    19.36 )-----------( 351      ( 11 Feb 2019 10:55 )             25.3                         8.1    19.12 )-----------( 377      ( 11 Feb 2019 06:45 )             27.0                         8.1    15.39 )-----------( 476      ( 10 Feb 2019 05:50 )             27.1 ***************************************************************  Micha Bear MD Pgy-1  Contact/Pager [phone] / [phone]  ***************************************************************    TOMEKA PEREZ  68y  MRN: [id number]      Patient is a 68y old  Female who presents with a chief complaint of Symptomatic anemia, Rt Fungating Breast mass , (11 Feb 2019 12:34)      Subjective/ON Events: No acute events overnight. Pt reports pain is well controlled on current regimen. Passing gas, +BMs. Wound vac w/ serosanguinous drainage.      MEDICATIONS  (STANDING):  amLODIPine   Tablet 5 milliGRAM(s) Oral daily  ceFAZolin   IVPB 2000 milliGRAM(s) IV Intermittent every 8 hours  enoxaparin Injectable 40 milliGRAM(s) SubCutaneous daily  lactated ringers. 1000 milliLiter(s) (75 mL/Hr) IV Continuous <Continuous>  lactobacillus acidophilus 1 Tablet(s) Oral every 12 hours  levETIRAcetam  Solution 250 milliGRAM(s) Oral two times a day    MEDICATIONS  (PRN):  acetaminophen   Tablet .. 650 milliGRAM(s) Oral every 6 hours PRN Mild Pain (1 - 3)  oxyCODONE    IR 10 milliGRAM(s) Oral every 6 hours PRN Severe Pain (7 - 10)  oxyCODONE    IR 5 milliGRAM(s) Oral every 6 hours PRN Severe Pain (7 - 10)  oxyCODONE    IR 2.5 milliGRAM(s) Oral every 6 hours PRN Moderate Pain (4 - 6)        Objective:    Vitals: Vital Signs Last 24 Hrs  T(C): 36.4 (02-12-19 @ 05:15), Max: 36.5 (02-11-19 @ 16:28)  T(F): 97.5 (02-12-19 @ 05:15), Max: 97.7 (02-11-19 @ 16:28)  HR: 93 (02-12-19 @ 05:15) (76 - 93)  BP: 155/97 (02-12-19 @ 05:15) (119/72 - 171/106)  BP(mean): 86 (02-11-19 @ 12:30) (83 - 122)  RR: 16 (02-12-19 @ 05:15) (10 - 20)  SpO2: 96% (02-12-19 @ 05:15) (96% - 100%)                  I&O's Summary    11 Feb 2019 07:01  -  12 Feb 2019 07:00  --------------------------------------------------------  IN: 350 mL / OUT: 300 mL / NET: 50 mL        PHYSICAL EXAM:  GENERAL: NAD, resting comfortably, awakes to voice, pleasant  HEAD:  Atraumatic, MMM  EYES: EOMI, PERRLA, conjunctiva and sclera clear  NECK: Supple, No JVD  CHEST/LUNG: R chest with wound vac in place; Clear to auscultation bilaterally, poor inspiratory effort; No wheeze, on RA.  HEART: Regular rate and rhythm; No murmurs, rubs, or gallops  ABDOMEN: Soft, Nontender, Nondistended; Bowel sounds present  EXTREMITIES:  2+ Peripheral Pulses, No clubbing, cyanosis, or edema  NEURO/PSYCH: AAOx3, nonfocal  SKIN: No rashes. Mastectomy site w/ wound vac - serosanguinous drainage. Appropriate tenderness around wound vac site.                                 LABS:                          7.8    15.87 )-----------( 298      ( 12 Feb 2019 06:15 )             27.1       02-12    140  |  101  |  13  ----------------------------<  88  3.9   |  26  |  0.35<L>    Ca    8.3<L>      12 Feb 2019 06:15  Phos  1.9     02-12  Mg     2.1     02-12    TPro  5.4<L>  /  Alb  2.0<L>  /  TBili  < 0.2<L>  /  DBili  x   /  AST  53<H>  /  ALT  8   /  AlkPhos  313<H>  02-12          ABG - ( 11 Feb 2019 08:24 )  pH, Arterial: 7.42  pH, Blood: x     /  pCO2: 36    /  pO2: 408   / HCO3: 24    / Base Excess: -0.8  /  SaO2: 100       PT/INR - ( 11 Feb 2019 06:45 )   PT: 12.6 SEC;   INR: 1.10        PTT - ( 11 Feb 2019 06:45 )  PTT:24.3 SEC ***************************************************************  Micha Bear MD Pgy-1  Contact/Pager [phone] / [phone]  ***************************************************************    TOMEKA PEREZ  68y  MRN: [id number]      Patient is a 68y old  Female who presents with a chief complaint of Symptomatic anemia, Rt Fungating Breast mass , (11 Feb 2019 12:34)      Subjective/ON Events: No acute events overnight. Pt reports pain is well controlled on current regimen. Passing gas, +BMs. Wound vac w/ serosanguinous drainage. No f/c/n/v/d.      MEDICATIONS  (STANDING):  amLODIPine   Tablet 5 milliGRAM(s) Oral daily  ceFAZolin   IVPB 2000 milliGRAM(s) IV Intermittent every 8 hours  enoxaparin Injectable 40 milliGRAM(s) SubCutaneous daily  lactated ringers. 1000 milliLiter(s) (75 mL/Hr) IV Continuous <Continuous>  lactobacillus acidophilus 1 Tablet(s) Oral every 12 hours  levETIRAcetam  Solution 250 milliGRAM(s) Oral two times a day    MEDICATIONS  (PRN):  acetaminophen   Tablet .. 650 milliGRAM(s) Oral every 6 hours PRN Mild Pain (1 - 3)  oxyCODONE    IR 10 milliGRAM(s) Oral every 6 hours PRN Severe Pain (7 - 10)  oxyCODONE    IR 5 milliGRAM(s) Oral every 6 hours PRN Severe Pain (7 - 10)  oxyCODONE    IR 2.5 milliGRAM(s) Oral every 6 hours PRN Moderate Pain (4 - 6)        Objective:    Vitals: Vital Signs Last 24 Hrs  T(C): 36.4 (02-12-19 @ 05:15), Max: 36.5 (02-11-19 @ 16:28)  T(F): 97.5 (02-12-19 @ 05:15), Max: 97.7 (02-11-19 @ 16:28)  HR: 93 (02-12-19 @ 05:15) (76 - 93)  BP: 155/97 (02-12-19 @ 05:15) (119/72 - 171/106)  BP(mean): 86 (02-11-19 @ 12:30) (83 - 122)  RR: 16 (02-12-19 @ 05:15) (10 - 20)  SpO2: 96% (02-12-19 @ 05:15) (96% - 100%)                  I&O's Summary    11 Feb 2019 07:01  -  12 Feb 2019 07:00  --------------------------------------------------------  IN: 350 mL / OUT: 300 mL / NET: 50 mL        PHYSICAL EXAM:  GENERAL: NAD, resting comfortably, awakes to voice, pleasant  HEAD:  Atraumatic, MMM  EYES: EOMI, PERRLA, conjunctiva and sclera clear  NECK: Supple, No JVD  CHEST/LUNG: R chest with wound vac in place; Clear to auscultation bilaterally, poor inspiratory effort; No wheeze, on RA.  HEART: Regular rate and rhythm; No murmurs, rubs, or gallops  ABDOMEN: Soft, Nontender, Nondistended; Bowel sounds present  EXTREMITIES:  2+ Peripheral Pulses, No clubbing, cyanosis, or edema  NEURO/PSYCH: AAOx3, nonfocal  SKIN: No rashes. Mastectomy site w/ wound vac - serosanguinous drainage. Appropriate tenderness around wound vac site.                                 LABS:                          7.8    15.87 )-----------( 298      ( 12 Feb 2019 06:15 )             27.1       02-12    140  |  101  |  13  ----------------------------<  88  3.9   |  26  |  0.35<L>    Ca    8.3<L>      12 Feb 2019 06:15  Phos  1.9     02-12  Mg     2.1     02-12    TPro  5.4<L>  /  Alb  2.0<L>  /  TBili  < 0.2<L>  /  DBili  x   /  AST  53<H>  /  ALT  8   /  AlkPhos  313<H>  02-12          ABG - ( 11 Feb 2019 08:24 )  pH, Arterial: 7.42  pH, Blood: x     /  pCO2: 36    /  pO2: 408   / HCO3: 24    / Base Excess: -0.8  /  SaO2: 100       PT/INR - ( 11 Feb 2019 06:45 )   PT: 12.6 SEC;   INR: 1.10        PTT - ( 11 Feb 2019 06:45 )  PTT:24.3 SEC

## 2019-02-12 NOTE — PROGRESS NOTE ADULT - PROBLEM SELECTOR PLAN 2
- Presents with R fungating breast mass, + Malodorous, + Drainage found to have likely mets to brain and lungs  -s/p palliative total mastectomy of R breast on 2/11; wound vac in place  - mets to brain, adrenals, lung, .  -Per CTA chest, neg for PE though there is mass effect on RUL pulmonary artery. Currently breathing on RA.  - oxycodone 2.5mg q6h prn for pain  -f/u surg recs

## 2019-02-12 NOTE — PROGRESS NOTE ADULT - PROBLEM SELECTOR PLAN 3
Highly concerning for malignancy with potential spread of disease to lungs/LN/brain but no definitive tissue biopsy at this time.  -mastectomy will provide tissue diagnosis to potentially pursue DMT/RT Significantly improved s/p mastectomy  -c/w supportive care  -Oxy for pain control will also benefit

## 2019-02-12 NOTE — PROGRESS NOTE ADULT - PROBLEM SELECTOR PLAN 4
- WBC stably elevated without localizing sign of infexn; afebrile  - blood cx negative   - ID thinks likely secondary to cancer and not infection

## 2019-02-12 NOTE — PROGRESS NOTE ADULT - ATTENDING COMMENTS
68 W p/w R fungating breast mass and constitutional sx. Imaging with brain mets, lung mets, LN mets and adrenal mets; suspect breast cancer as primary tumor. s/p palliative R total mastectomy on 2/11/2019. Patient is still weighing option of oncologic evaluation. Discussed with daughter,      Pain well-controlled per patient report. Will need wound vac for several weeks. Will clarify outpatient plan for plastics f/u.

## 2019-02-12 NOTE — PROGRESS NOTE ADULT - SUBJECTIVE AND OBJECTIVE BOX
ANESTHESIA POSTOP CHECK    68y Female POSTOP DAY 1 S/P Right Mastectomy with skin Graft    Vital Signs Last 24 Hrs  T(C): 36.4 (12 Feb 2019 05:15), Max: 36.5 (11 Feb 2019 16:28)  T(F): 97.5 (12 Feb 2019 05:15), Max: 97.7 (11 Feb 2019 16:28)  HR: 93 (12 Feb 2019 05:15) (76 - 93)  BP: 155/97 (12 Feb 2019 05:15) (119/72 - 171/106)  BP(mean): 86 (11 Feb 2019 12:30) (83 - 122)  RR: 16 (12 Feb 2019 05:15) (10 - 20)  SpO2: 96% (12 Feb 2019 05:15) (96% - 100%)  I&O's Summary    11 Feb 2019 07:01  -  12 Feb 2019 07:00  --------------------------------------------------------  IN: 350 mL / OUT: 300 mL / NET: 50 mL        [x ] NO APPARENT ANESTHESIA COMPLICATIONS      Comments:

## 2019-02-12 NOTE — PROGRESS NOTE ADULT - PROBLEM SELECTOR PLAN 9
DVT PPx: lovenox 40mg QD   Diet: DASH diet  Dispo: PT recommending rehab upon discharge. DVT PPx: lovenox 40mg QD   Diet: DASH diet  Dispo: PT recommending rehab upon discharge. From our standpoint, pt is medically stable for discharge. However, need to be sure pt is clear from surgical stand point and if pt can receive proper wound care resources at rehab.

## 2019-02-12 NOTE — PROGRESS NOTE ADULT - PROBLEM SELECTOR PLAN 1
Due to skin graft, requiring wound vac.  -will start standing Oxy IR 2.5mg PO TID (patient agrees and can refuse, she would otherwise not be proactive about asking for PRN's)  -c/w Oxy 5mg PO PRN for severe pain  -Wound Vac as per Wound Care/Surgery

## 2019-02-12 NOTE — PROGRESS NOTE ADULT - PROBLEM SELECTOR PLAN 5
As per primary team, patient had previously expressed an interest into no longer pursuing medical treatment, potential hospice. Upon questioning the patient, she is deferring any such decision due to her pain. If her pain is better controlled, her medical-decision making may change

## 2019-02-12 NOTE — PROGRESS NOTE ADULT - PROBLEM SELECTOR PLAN 6
HCP form completed: Daughter (Sandy Puente), named HCP. Co- Adriano Galan named as alternate proxy. Form placed in chart, copy provided to daughter.  -it has been stated that any treatment offered would be palliative, not curative, in nature  -patient deferring AD conversation, can revisit post-procedure as patient has been overwhelmed

## 2019-02-12 NOTE — PROGRESS NOTE ADULT - PROBLEM SELECTOR PLAN 4
Significant cachexia and poor intake in the setting of likely extensive malignancy.  -liberalize diet where appropriate  -encourage PO intake  -Ensure supplements Highly concerning for malignancy with potential spread of disease to lungs/LN/brain but no definitive tissue biopsy at this time.  -mastectomy will provide tissue diagnosis to potentially pursue DMT/RT

## 2019-02-12 NOTE — PROGRESS NOTE ADULT - ATTENDING COMMENTS
Pt seen with resident.  Agree with above. add oxy ir 2.5mg tid with prn. Considering not pursuing further treatment.  Attempted to speak with daughter but was not at bedside in afternoon possibly due to weather.  Will follow up tomorrow.  May consider [place] with wounds

## 2019-02-12 NOTE — PROGRESS NOTE ADULT - PROBLEM SELECTOR PLAN 1
- Patient wants daughter (Sandy) and co- (Adriano Galan) to be her HCP  -Full code. Has capacity. Patient is  in [employer], has strong mariola that God will heal her  - Palliative care on board  - Had GOC conversation 2/5 (see GOC note). Pt still ambivalent about speaking with medical oncology.  - currently agrees to blood transfusions.   - will continue to have ongoing GOC discussions w/ pt & HCPs - Patient wants daughter (Sandy) and co- (Adriano Galan) to be her HCP  -Full code. Has capacity. Patient is  in [employer], has strong mariola that God will heal her  - Palliative care on board  - Had GOC conversation 2/5 (see GOC note). Pt still ambivalent about speaking with medical oncology. She does not want to receive chemotherapy. More agreeable to hospice now  - will continue to have ongoing GOC discussions w/ pt & HCPs  - agrees to blood transfusions.

## 2019-02-12 NOTE — PROGRESS NOTE ADULT - ASSESSMENT
68 year old female with R fungating breast mass and metastatic disease to lung, brain, and bone s/p resection on 2/11/19.     PLAN:  - VAC per PRS  - Pain control   - Care per primary     Surgical Oncology / D team surgery  Pager [phone]

## 2019-02-12 NOTE — PROGRESS NOTE ADULT - PROBLEM SELECTOR PLAN 3
- secondary to metastatic breast cancer  - MRI head with >15 metastatic lesions within the brain. Keppra 500mg BID & Decadron 4mg q6h. Discussed w/ neurosurg if ok for pt to be on DVT PPx - given lesions don't appear hemorrhagic, okay to continue Lovenox.  - will need whole brain radiation per radiation oncology. Can be revisited as outpt.

## 2019-02-12 NOTE — PROVIDER CONTACT NOTE (OTHER) - ASSESSMENT
Patient is alert and oriented x4, in no apparent distress. Medication educated on the  importance of medication compliance.  Patient verbalized understanding but refused to take pills.

## 2019-02-12 NOTE — PROGRESS NOTE ADULT - PROBLEM SELECTOR PLAN 2
Significantly improved s/p mastectomy  -c/w supportive care  -Oxy for pain control will also benefit May be related to opiates, poor PO intake, or post-op ileus  -started conservative bowel regimen (Senna, Miralax PRN)  -monitor stool output

## 2019-02-12 NOTE — PROGRESS NOTE ADULT - ASSESSMENT
68F s/p R radical mastectomy, 15x20 STSG from R thigh on 2/11/19    Neuro: Pain control  Resp: IS  GI: Regular diet, bowel reg  MSK: WBAT, PT/OT  Heme: DVT PPX per primary team  Dispo: recommend staying for full 7 days given medical history and procedure, will plan for 6 weeks of VAC change 3x a week upon discharge

## 2019-02-12 NOTE — PROGRESS NOTE ADULT - SUBJECTIVE AND OBJECTIVE BOX
PLASTIC SURGERY DAILY PROGRESS NOTE:     Subjective: Patient seen and examined, no acute events overnight,  pain well controlled, afebrile overnight, intermittently hypertensive    Objective:    MEDICATIONS  (STANDING):  amLODIPine   Tablet 5 milliGRAM(s) Oral daily  ceFAZolin   IVPB 2000 milliGRAM(s) IV Intermittent every 8 hours  enoxaparin Injectable 40 milliGRAM(s) SubCutaneous daily  lactated ringers. 1000 milliLiter(s) (75 mL/Hr) IV Continuous <Continuous>  lactobacillus acidophilus 1 Tablet(s) Oral every 12 hours  levETIRAcetam  Solution 250 milliGRAM(s) Oral two times a day    MEDICATIONS  (PRN):  acetaminophen   Tablet .. 650 milliGRAM(s) Oral every 6 hours PRN Mild Pain (1 - 3)  oxyCODONE    IR 10 milliGRAM(s) Oral every 6 hours PRN Severe Pain (7 - 10)  oxyCODONE    IR 5 milliGRAM(s) Oral every 6 hours PRN Severe Pain (7 - 10)  oxyCODONE    IR 2.5 milliGRAM(s) Oral every 6 hours PRN Moderate Pain (4 - 6)      Vital Signs Last 24 Hrs  T(C): 36.4 (2019 05:15), Max: 36.5 (2019 16:28)  T(F): 97.5 (2019 05:15), Max: 97.7 (2019 16:28)  HR: 93 (2019 05:15) (76 - 93)  BP: 155/97 (2019 05:15) (119/72 - 171/106)  BP(mean): 86 (2019 12:30) (83 - 122)  RR: 16 (2019 05:15) (10 - 20)  SpO2: 96% (2019 05:15) (96% - 100%)    I&O's Detail    2019 07:01  -  2019 07:00  --------------------------------------------------------  IN:    lactated ringers.: 150 mL    Oral Fluid: 200 mL  Total IN: 350 mL    OUT:    Voided: 300 mL  Total OUT: 300 mL    Total NET: 50 mL    PE:  Gen: NAD  R Breast: wound vac holding suction well, intact   RLE: Dressing clean, dry, intact, warm and well perfused          Daily     Daily Weight in k.6 (2019 05:15)    LABS:                        7.8    19.36 )-----------( 351      ( 2019 10:55 )             25.3     02-11    141  |  100  |  16  ----------------------------<  129<H>  3.9   |  23  |  0.36<L>    Ca    8.8      2019 06:45  Phos  2.0     -11  Mg     2.2     02-11      PT/INR - ( 2019 06:45 )   PT: 12.6 SEC;   INR: 1.10          PTT - ( 2019 06:45 )  PTT:24.3 SEC

## 2019-02-12 NOTE — PROGRESS NOTE ADULT - ASSESSMENT
68yoF with poor medical care and no known PMH presents with multiple months of growing, fungating, malodorous right breast mass, fall and dizziness. Likely metastatic breast cancer. Found to be severely anemic on lab work, now more stable. MRI head 2/5 demonstrates many metastatic lesions within the brain. 2/11 pt s/p palliative mastectomy, recovering well.

## 2019-02-13 DIAGNOSIS — R56.9 UNSPECIFIED CONVULSIONS: ICD-10-CM

## 2019-02-13 LAB
ANION GAP SERPL CALC-SCNC: 11 MMO/L — SIGNIFICANT CHANGE UP (ref 7–14)
ANION GAP SERPL CALC-SCNC: 15 MMO/L — HIGH (ref 7–14)
BUN SERPL-MCNC: 7 MG/DL — SIGNIFICANT CHANGE UP (ref 7–23)
BUN SERPL-MCNC: 7 MG/DL — SIGNIFICANT CHANGE UP (ref 7–23)
CALCIUM SERPL-MCNC: 7.9 MG/DL — LOW (ref 8.4–10.5)
CALCIUM SERPL-MCNC: 8 MG/DL — LOW (ref 8.4–10.5)
CHLORIDE SERPL-SCNC: 96 MMOL/L — LOW (ref 98–107)
CHLORIDE SERPL-SCNC: 99 MMOL/L — SIGNIFICANT CHANGE UP (ref 98–107)
CO2 SERPL-SCNC: 21 MMOL/L — LOW (ref 22–31)
CO2 SERPL-SCNC: 22 MMOL/L — SIGNIFICANT CHANGE UP (ref 22–31)
CREAT SERPL-MCNC: 0.21 MG/DL — LOW (ref 0.5–1.3)
CREAT SERPL-MCNC: 0.25 MG/DL — LOW (ref 0.5–1.3)
GLUCOSE SERPL-MCNC: 53 MG/DL — LOW (ref 70–99)
GLUCOSE SERPL-MCNC: 59 MG/DL — LOW (ref 70–99)
MAGNESIUM SERPL-MCNC: 1.9 MG/DL — SIGNIFICANT CHANGE UP (ref 1.6–2.6)
MAGNESIUM SERPL-MCNC: 2 MG/DL — SIGNIFICANT CHANGE UP (ref 1.6–2.6)
PHOSPHATE SERPL-MCNC: 2.5 MG/DL — SIGNIFICANT CHANGE UP (ref 2.5–4.5)
PHOSPHATE SERPL-MCNC: 2.8 MG/DL — SIGNIFICANT CHANGE UP (ref 2.5–4.5)
POTASSIUM SERPL-MCNC: 4.7 MMOL/L — SIGNIFICANT CHANGE UP (ref 3.5–5.3)
POTASSIUM SERPL-MCNC: 5.8 MMOL/L — HIGH (ref 3.5–5.3)
POTASSIUM SERPL-SCNC: 4.7 MMOL/L — SIGNIFICANT CHANGE UP (ref 3.5–5.3)
POTASSIUM SERPL-SCNC: 5.8 MMOL/L — HIGH (ref 3.5–5.3)
PROLACTIN SERPL-MCNC: 8.9 NG/ML — SIGNIFICANT CHANGE UP (ref 3.4–24.1)
SODIUM SERPL-SCNC: 129 MMOL/L — LOW (ref 135–145)
SODIUM SERPL-SCNC: 135 MMOL/L — SIGNIFICANT CHANGE UP (ref 135–145)

## 2019-02-13 PROCEDURE — 99233 SBSQ HOSP IP/OBS HIGH 50: CPT | Mod: GC

## 2019-02-13 PROCEDURE — 99497 ADVNCD CARE PLAN 30 MIN: CPT | Mod: 25

## 2019-02-13 PROCEDURE — 99233 SBSQ HOSP IP/OBS HIGH 50: CPT

## 2019-02-13 PROCEDURE — 70450 CT HEAD/BRAIN W/O DYE: CPT | Mod: 26

## 2019-02-13 RX ORDER — LEVETIRACETAM 250 MG/1
500 TABLET, FILM COATED ORAL EVERY 12 HOURS
Qty: 0 | Refills: 0 | Status: DISCONTINUED | OUTPATIENT
Start: 2019-02-13 | End: 2019-02-14

## 2019-02-13 RX ORDER — OXYCODONE HYDROCHLORIDE 5 MG/1
2.5 TABLET ORAL EVERY 6 HOURS
Qty: 0 | Refills: 0 | Status: DISCONTINUED | OUTPATIENT
Start: 2019-02-13 | End: 2019-02-16

## 2019-02-13 RX ORDER — LEVETIRACETAM 250 MG/1
1000 TABLET, FILM COATED ORAL ONCE
Qty: 0 | Refills: 0 | Status: COMPLETED | OUTPATIENT
Start: 2019-02-13 | End: 2019-02-13

## 2019-02-13 RX ORDER — HYDRALAZINE HCL 50 MG
10 TABLET ORAL ONCE
Qty: 0 | Refills: 0 | Status: COMPLETED | OUTPATIENT
Start: 2019-02-13 | End: 2019-02-13

## 2019-02-13 RX ADMIN — LEVETIRACETAM 400 MILLIGRAM(S): 250 TABLET, FILM COATED ORAL at 09:04

## 2019-02-13 RX ADMIN — Medication 100 MILLIGRAM(S): at 12:40

## 2019-02-13 RX ADMIN — SENNA PLUS 2 TABLET(S): 8.6 TABLET ORAL at 22:02

## 2019-02-13 RX ADMIN — LEVETIRACETAM 400 MILLIGRAM(S): 250 TABLET, FILM COATED ORAL at 17:59

## 2019-02-13 RX ADMIN — Medication 10 MILLIGRAM(S): at 07:36

## 2019-02-13 RX ADMIN — Medication 100 MILLIGRAM(S): at 03:58

## 2019-02-13 RX ADMIN — Medication 2 MILLIGRAM(S): at 07:37

## 2019-02-13 RX ADMIN — ENOXAPARIN SODIUM 40 MILLIGRAM(S): 100 INJECTION SUBCUTANEOUS at 22:02

## 2019-02-13 RX ADMIN — AMLODIPINE BESYLATE 5 MILLIGRAM(S): 2.5 TABLET ORAL at 05:10

## 2019-02-13 RX ADMIN — Medication 1 TABLET(S): at 22:03

## 2019-02-13 RX ADMIN — SODIUM CHLORIDE 75 MILLILITER(S): 9 INJECTION, SOLUTION INTRAVENOUS at 23:01

## 2019-02-13 NOTE — CONSULT NOTE ADULT - REASON FOR ADMISSION
Symptomatic anemia, Rt Fungating Breast mass ,

## 2019-02-13 NOTE — PROVIDER CONTACT NOTE (OTHER) - RECOMMENDATIONS
Hydralazine to manage blood pressure, ativan to manage seizure like activity. Continue to monitor patient. O2 support. CT of the head to be done for patient to rule out stroke.

## 2019-02-13 NOTE — PROVIDER CONTACT NOTE (OTHER) - ASSESSMENT
Patient and daughter refusing medications. Patient is feeling lethargic and does not want to be bothered.

## 2019-02-13 NOTE — PROGRESS NOTE ADULT - ASSESSMENT
68F s/p R radical mastectomy, 15x20 STSG from R thigh on 2/11/19    Neuro: follow up neuro recs, CT head, patient not taking home keppra per refusal from daugther  Resp: IS  GI: Regular diet, bowel reg  MSK: WBAT, PT/OT  Heme: DVT PPX per primary team/neuro  Wound vac to remain in place until POD#6, need to have wound vac delivered to bedside.

## 2019-02-13 NOTE — CONSULT NOTE ADULT - CONSULT REQUESTED DATE/TIME
01-Feb-2019
04-Feb-2019 14:10
06-Feb-2019 12:45
08-Feb-2019 11:15
08-Feb-2019 15:09
13-Feb-2019 09:16
31-Jan-2019 23:52
03-Feb-2019
01-Feb-2019 12:26

## 2019-02-13 NOTE — PROGRESS NOTE ADULT - ATTENDING COMMENTS
68 W p/w R fungating breast mass and constitutional sx. Imaging with brain mets, lung mets, LN mets and adrenal mets; suspect breast cancer as primary tumor. s/p palliative R total mastectomy on 2/11/2019. Overnight, patient had seizure. This is in setting of brain mets and patient declining AEDs. She has been loaded with IV Keppra and is on maintenance dose Keppra. She remains post-ictal.    Will need to  patient on goals of management as she is at increased risk of recurrent seizure and adverse effect of seizure (aspiration) if she is not agreeable to medical therapy.

## 2019-02-13 NOTE — PROGRESS NOTE ADULT - PROBLEM SELECTOR PLAN 8
- stable in ~200s   - likely in the setting of metastatic breast ca to bone  - ggt mildly high   - monitor LFTs

## 2019-02-13 NOTE — PROGRESS NOTE ADULT - PROBLEM SELECTOR PLAN 2
May be related to opiates, poor PO intake, or post-op ileus  -c/w bowel regimen (Senna, Miralax PRN)  -monitor stool output

## 2019-02-13 NOTE — PROVIDER CONTACT NOTE (OTHER) - RECOMMENDATIONS
Continue to monitor patient. Amlodipine to be given this morning and recheck Blood pressure about an hour after giving amlodipine.

## 2019-02-13 NOTE — PROGRESS NOTE ADULT - SUBJECTIVE AND OBJECTIVE BOX
***************************************************************  Micha Bear MD Pgy-1  Contact/Pager [phone] / [phone]  ***************************************************************    TOMEKA PEREZ  68y  MRN: [id number]      Patient is a 68y old  Female who presents with a chief complaint of Symptomatic anemia, Rt Fungating Breast mass , (13 Feb 2019 09:16)      Subjective/ON Events: Notified by NF and RN at 7am pt had a code stroke, however her symptoms were actually from a seizure. Pt was noted at first to have head deviation to the left and then eye deviation to the left. This was followed by GTC which broke shortly after administration of IV Ativan 2mg. Afterward pt was in post-ictal state and minimally responsive to stimuli. Also noted to be hypertensive to 170/100 -> improved after 10mg hydralazine IV. Most likely cause is pt's known brain metastases for which she is supposed to be taking Keppra. However, pt refused last night's dose and had already asked for a dose reduction last week. Also refusing steroids recommended by neurosurgery.      MEDICATIONS  (STANDING):  amLODIPine   Tablet 5 milliGRAM(s) Oral daily  ceFAZolin   IVPB 2000 milliGRAM(s) IV Intermittent every 8 hours  enoxaparin Injectable 40 milliGRAM(s) SubCutaneous daily  lactated ringers. 1000 milliLiter(s) (75 mL/Hr) IV Continuous <Continuous>  lactobacillus acidophilus 1 Tablet(s) Oral every 12 hours  levETIRAcetam  IVPB 500 milliGRAM(s) IV Intermittent every 12 hours  oxyCODONE    IR 2.5 milliGRAM(s) Oral every 8 hours  senna 2 Tablet(s) Oral at bedtime    MEDICATIONS  (PRN):  acetaminophen   Tablet .. 650 milliGRAM(s) Oral every 6 hours PRN Mild Pain (1 - 3)  oxyCODONE    IR 5 milliGRAM(s) Oral every 6 hours PRN Severe Pain (7 - 10)  oxyCODONE    IR 2.5 milliGRAM(s) Oral every 6 hours PRN Moderate Pain (4 - 6)  polyethylene glycol 3350 17 Gram(s) Oral daily PRN Constipation        Objective:    Vitals: Vital Signs Last 24 Hrs  T(C): 37.7 (02-13-19 @ 09:00), Max: 37.7 (02-13-19 @ 09:00)  T(F): 99.9 (02-13-19 @ 09:00), Max: 99.9 (02-13-19 @ 09:00)  HR: 110 (02-13-19 @ 09:00) (65 - 975)  BP: 110/76 (02-13-19 @ 09:00) (110/76 - 175/108)  RR: 20 (02-13-19 @ 09:00) (18 - 20)  SpO2: 100% (02-13-19 @ 09:00) (91% - 100%)                I&O's Summary    12 Feb 2019 07:01  -  13 Feb 2019 07:00  --------------------------------------------------------  IN: 1500 mL / OUT: 500 mL / NET: 1000 mL    13 Feb 2019 07:01  -  13 Feb 2019 12:52  --------------------------------------------------------  IN: 0 mL / OUT: 400 mL / NET: -400 mL        PHYSICAL EXAM:  GENERAL: lethargic  HEAD:  Atraumatic, Normocephalic  EYES: EOMI, PERRLA, conjunctiva and sclera clear  CHEST/LUNG: Clear to auscultation bilaterally; No rales, rhonchi, wheezing, or rubs  HEART: mildly tachycardic, regular rhythm; No murmurs, rubs, or gallops  ABDOMEN: Soft, Nontender, Nondistended; Bowel sounds present  SKIN: R mastectomy incision w/ wound vac in place. No rashes or lesions  NERVOUS SYSTEM:  opens eyes to voice but not following commands                                  LABS:  02-13    135  |  99  |  7   ----------------------------<  59<L>  4.7   |  21<L>  |  0.25<L>  02-13    129<L>  |  96<L>  |  7   ----------------------------<  53<L>  5.8<H>   |  22  |  0.21<L>  02-12    140  |  101  |  13  ----------------------------<  88  3.9   |  26  |  0.35<L>    Ca    8.0<L>      13 Feb 2019 10:25  Ca    7.9<L>      13 Feb 2019 06:40  Ca    8.3<L>      12 Feb 2019 06:15  Phos  2.5     02-13  Mg     1.9     02-13    TPro  5.4<L>  /  Alb  2.0<L>  /  TBili  < 0.2<L>  /  DBili  x   /  AST  53<H>  /  ALT  8   /  AlkPhos  313<H>  02-12                          7.8    15.87 )-----------( 298      ( 12 Feb 2019 06:15 )             27.1                         7.8    19.36 )-----------( 351      ( 11 Feb 2019 10:55 )             25.3                         8.1    19.12 )-----------( 377      ( 11 Feb 2019 06:45 )             27.0     CAPILLARY BLOOD GLUCOSE      POCT Blood Glucose.: 79 mg/dL (13 Feb 2019 06:56)      RADIOLOGY & ADDITIONAL TESTS:  < from: CT Head No Cont (02.13.19 @ 09:32) >  FINDINGS:     Abnormal area of low-attenuation involving the inferior left parietal   region is seen which measures approximately 2.3 x 1.5 x 0.8 cm. This   finding corresponds to a peripherally enhancing lesion seen on prior MRI.   Again noted are scattered bilateral cerebral and cerebellar   hypodensities, representing metastatic lesions, which are better   appreciated on recent MR brain 2/5/2019. There is no CT evidence of acute   hemorrhage.    No abnormal extra-axial collection, mass effect, or midline shift. The   basal cisterns are patent. No evidence of central herniation.     Mild cerebral volume loss with proportional prominence of the sulci and   ventricles. There is periventricular patchy white matter hypoattenuation   which is nonspecific in etiology but likely related to chronic   microvascular ischemic disease.    The visualized paranasal sinuses are clear. The mastoid air cells and   middle ear cavities are clear.      IMPRESSION:     Stable examination without evidence of acute intracranial hemorrhage.    < end of copied text > ***************************************************************  Micha Bear MD Pgy[facility]1  Contact/Pager [phone] / [phone]  ***************************************************************    TOMEKA PEREZ  68y  MRN: [id number]      Patient is a 68y old  Female who presents with a chief complaint of Symptomatic anemia, Rt Fungating Breast mass , (13 Feb 2019 09:16)      Subjective/ON Events: Notified by NF and RN at 7am pt had a code stroke, however her symptoms were actually from a seizure. Pt was noted at first to have head deviation to the left and then eye deviation to the left. This was followed by GTC which broke shortly after administration of IV Ativan 2mg. Afterward pt was in post-ictal state and minimally responsive to stimuli. Also noted to be hypertensive to 170/100 -> improved after 10mg hydralazine IV. Most likely cause is pt's known brain metastases for which she is supposed to be taking Keppra. However, pt refused last night's dose and had already asked for a dose reduction last week. Also refusing steroids recommended by neurosurgery. Unable to obtain ROS 2/2 post-ictal state.      MEDICATIONS  (STANDING):  amLODIPine   Tablet 5 milliGRAM(s) Oral daily  ceFAZolin   IVPB 2000 milliGRAM(s) IV Intermittent every 8 hours  enoxaparin Injectable 40 milliGRAM(s) SubCutaneous daily  lactated ringers. 1000 milliLiter(s) (75 mL/Hr) IV Continuous <Continuous>  lactobacillus acidophilus 1 Tablet(s) Oral every 12 hours  levETIRAcetam  IVPB 500 milliGRAM(s) IV Intermittent every 12 hours  oxyCODONE    IR 2.5 milliGRAM(s) Oral every 8 hours  senna 2 Tablet(s) Oral at bedtime    MEDICATIONS  (PRN):  acetaminophen   Tablet .. 650 milliGRAM(s) Oral every 6 hours PRN Mild Pain (1 - 3)  oxyCODONE    IR 5 milliGRAM(s) Oral every 6 hours PRN Severe Pain (7 - 10)  oxyCODONE    IR 2.5 milliGRAM(s) Oral every 6 hours PRN Moderate Pain (4 - 6)  polyethylene glycol 3350 17 Gram(s) Oral daily PRN Constipation        Objective:    Vitals: Vital Signs Last 24 Hrs  T(C): 37.7 (02-13-19 @ 09:00), Max: 37.7 (02-13-19 @ 09:00)  T(F): 99.9 (02-13-19 @ 09:00), Max: 99.9 (02-13-19 @ 09:00)  HR: 110 (02-13-19 @ 09:00) (65 - 975)  BP: 110/76 (02-13-19 @ 09:00) (110/76 - 175/108)  RR: 20 (02-13-19 @ 09:00) (18 - 20)  SpO2: 100% (02-13-19 @ 09:00) (91% - 100%)                I&O's Summary    12 Feb 2019 07:01  -  13 Feb 2019 07:00  --------------------------------------------------------  IN: 1500 mL / OUT: 500 mL / NET: 1000 mL    13 Feb 2019 07:01  -  13 Feb 2019 12:52  --------------------------------------------------------  IN: 0 mL / OUT: 400 mL / NET: -400 mL        PHYSICAL EXAM:  GENERAL: lethargic  HEAD:  Atraumatic, Normocephalic  EYES: EOMI, PERRLA, conjunctiva and sclera clear  CHEST/LUNG: Clear to auscultation bilaterally; No rales, rhonchi, wheezing, or rubs  HEART: mildly tachycardic, regular rhythm; No murmurs, rubs, or gallops  ABDOMEN: Soft, Nontender, Nondistended; Bowel sounds present  SKIN: R mastectomy incision w/ wound vac in place. No rashes or lesions  NERVOUS SYSTEM:  opens eyes to voice but not following commands                                  LABS:  02-13    135  |  99  |  7   ----------------------------<  59<L>  4.7   |  21<L>  |  0.25<L>  02-13    129<L>  |  96<L>  |  7   ----------------------------<  53<L>  5.8<H>   |  22  |  0.21<L>  02-12    140  |  101  |  13  ----------------------------<  88  3.9   |  26  |  0.35<L>    Ca    8.0<L>      13 Feb 2019 10:25  Ca    7.9<L>      13 Feb 2019 06:40  Ca    8.3<L>      12 Feb 2019 06:15  Phos  2.5     02-13  Mg     1.9     02-13    TPro  5.4<L>  /  Alb  2.0<L>  /  TBili  < 0.2<L>  /  DBili  x   /  AST  53<H>  /  ALT  8   /  AlkPhos  313<H>  02-12                          7.8    15.87 )-----------( 298      ( 12 Feb 2019 06:15 )             27.1                         7.8    19.36 )-----------( 351      ( 11 Feb 2019 10:55 )             25.3                         8.1    19.12 )-----------( 377      ( 11 Feb 2019 06:45 )             27.0     CAPILLARY BLOOD GLUCOSE      POCT Blood Glucose.: 79 mg/dL (13 Feb 2019 06:56)      RADIOLOGY & ADDITIONAL TESTS:  < from: CT Head No Cont (02.13.19 @ 09:32) >  FINDINGS:     Abnormal area of low-attenuation involving the inferior left parietal   region is seen which measures approximately 2.3 x 1.5 x 0.8 cm. This   finding corresponds to a peripherally enhancing lesion seen on prior MRI.   Again noted are scattered bilateral cerebral and cerebellar   hypodensities, representing metastatic lesions, which are better   appreciated on recent MR brain 2/5/2019. There is no CT evidence of acute   hemorrhage.    No abnormal extra-axial collection, mass effect, or midline shift. The   basal cisterns are patent. No evidence of central herniation.     Mild cerebral volume loss with proportional prominence of the sulci and   ventricles. There is periventricular patchy white matter hypoattenuation   which is nonspecific in etiology but likely related to chronic   microvascular ischemic disease.    The visualized paranasal sinuses are clear. The mastoid air cells and   middle ear cavities are clear.      IMPRESSION:     Stable examination without evidence of acute intracranial hemorrhage.    < end of copied text >

## 2019-02-13 NOTE — PROGRESS NOTE ADULT - ASSESSMENT
69yo Woman w/ Fungating R Breast Mass presenting with SOB found to have symptomatic anemia in the setting of likely malignancy with imaging showing extensive metastatic lung and brain involvement, s/p palliative mastectomy.

## 2019-02-13 NOTE — PROGRESS NOTE ADULT - SUBJECTIVE AND OBJECTIVE BOX
SURGERY DAILY PROGRESS NOTE:       SUBJECTIVE/ROS: Patient examined at bedside, at that time pt was responsive, oriented. Didn't report any pain. Later was called that pt later was found unresponsive AMS. Code stroke was called and neurology at bedside.           MEDICATIONS  (STANDING):  amLODIPine   Tablet 5 milliGRAM(s) Oral daily  ceFAZolin   IVPB 2000 milliGRAM(s) IV Intermittent every 8 hours  enoxaparin Injectable 40 milliGRAM(s) SubCutaneous daily  hydrALAZINE Injectable 10 milliGRAM(s) IV Push once  lactated ringers. 1000 milliLiter(s) (75 mL/Hr) IV Continuous <Continuous>  lactobacillus acidophilus 1 Tablet(s) Oral every 12 hours  levETIRAcetam  IVPB 1000 milliGRAM(s) IV Intermittent once  levETIRAcetam  IVPB 500 milliGRAM(s) IV Intermittent every 12 hours  LORazepam   Injectable 2 milliGRAM(s) IV Push once  oxyCODONE    IR 2.5 milliGRAM(s) Oral every 8 hours  senna 2 Tablet(s) Oral at bedtime    MEDICATIONS  (PRN):  acetaminophen   Tablet .. 650 milliGRAM(s) Oral every 6 hours PRN Mild Pain (1 - 3)  oxyCODONE    IR 5 milliGRAM(s) Oral every 6 hours PRN Severe Pain (7 - 10)  oxyCODONE    IR 2.5 milliGRAM(s) Oral every 6 hours PRN Moderate Pain (4 - 6)  polyethylene glycol 3350 17 Gram(s) Oral daily PRN Constipation      OBJECTIVE:    Vital Signs Last 24 Hrs  T(C): 37 (2019 05:07), Max: 37 (2019 05:07)  T(F): 98.6 (2019 05:07), Max: 98.6 (2019 05:07)  HR: 104 (2019 06:24) (65 - 975)  BP: 172/104 (2019 06:24) (153/87 - 175/108)  BP(mean): --  RR: 18 (2019 05:07) (18 - 18)  SpO2: 95% (2019 05:07) (91% - 95%)        I&O's Detail    2019 07:01  -  2019 07:00  --------------------------------------------------------  IN:    lactated ringers.: 1000 mL    Oral Fluid: 400 mL    Solution: 100 mL  Total IN: 1500 mL    OUT:    Voided: 500 mL  Total OUT: 500 mL    Total NET: 1000 mL          Daily     Daily Weight in k.2 (2019 05:07)    LABS:                        7.8    15.87 )-----------( 298      ( 2019 06:15 )             27.1     02-12    140  |  101  |  13  ----------------------------<  88  3.9   |  26  |  0.35<L>    Ca    8.3<L>      2019 06:15  Phos  1.9       Mg     2.1         TPro  5.4<L>  /  Alb  2.0<L>  /  TBili  < 0.2<L>  /  DBili  x   /  AST  53<H>  /  ALT  8   /  AlkPhos  313<H>  -12                  PHYSICAL EXAM:  Pulm: unlabored breathing on RA  CV: radial pulse 2+, cap refil <2sec  Chest: VAC w/ good seal, no surrounding hematoma SURGERY DAILY PROGRESS NOTE:       SUBJECTIVE/ROS: Patient examined at bedside, at that time pt was responsive, oriented. Didn't report any pain. Later was called that pt later was found unresponsive AMS. Code stroke was called and neurology at bedside.           MEDICATIONS  (STANDING):  amLODIPine   Tablet 5 milliGRAM(s) Oral daily  ceFAZolin   IVPB 2000 milliGRAM(s) IV Intermittent every 8 hours  enoxaparin Injectable 40 milliGRAM(s) SubCutaneous daily  hydrALAZINE Injectable 10 milliGRAM(s) IV Push once  lactated ringers. 1000 milliLiter(s) (75 mL/Hr) IV Continuous <Continuous>  lactobacillus acidophilus 1 Tablet(s) Oral every 12 hours  levETIRAcetam  IVPB 1000 milliGRAM(s) IV Intermittent once  levETIRAcetam  IVPB 500 milliGRAM(s) IV Intermittent every 12 hours  LORazepam   Injectable 2 milliGRAM(s) IV Push once  oxyCODONE    IR 2.5 milliGRAM(s) Oral every 8 hours  senna 2 Tablet(s) Oral at bedtime    MEDICATIONS  (PRN):  acetaminophen   Tablet .. 650 milliGRAM(s) Oral every 6 hours PRN Mild Pain (1 - 3)  oxyCODONE    IR 5 milliGRAM(s) Oral every 6 hours PRN Severe Pain (7 - 10)  oxyCODONE    IR 2.5 milliGRAM(s) Oral every 6 hours PRN Moderate Pain (4 - 6)  polyethylene glycol 3350 17 Gram(s) Oral daily PRN Constipation      OBJECTIVE:    Vital Signs Last 24 Hrs  T(C): 37 (2019 05:07), Max: 37 (2019 05:07)  T(F): 98.6 (2019 05:07), Max: 98.6 (2019 05:07)  HR: 104 (2019 06:24) (65 - 975)  BP: 172/104 (2019 06:24) (153/87 - 175/108)  BP(mean): --  RR: 18 (2019 05:07) (18 - 18)  SpO2: 95% (2019 05:07) (91% - 95%)        I&O's Detail    2019 07:01  -  2019 07:00  --------------------------------------------------------  IN:    lactated ringers.: 1000 mL    Oral Fluid: 400 mL    Solution: 100 mL  Total IN: 1500 mL    OUT:    Voided: 500 mL  Total OUT: 500 mL    Total NET: 1000 mL          Daily     Daily Weight in k.2 (2019 05:07)    LABS:                        7.8    15.87 )-----------( 298      ( 2019 06:15 )             27.1     02-12    140  |  101  |  13  ----------------------------<  88  3.9   |  26  |  0.35<L>    Ca    8.3<L>      2019 06:15  Phos  1.9       Mg     2.1         TPro  5.4<L>  /  Alb  2.0<L>  /  TBili  < 0.2<L>  /  DBili  x   /  AST  53<H>  /  ALT  8   /  AlkPhos  313<H>  -12                  PHYSICAL EXAM:  Pulm: unlabored breathing on RA  CV: radial pulse 2+, cap refil <2sec  Chest: VAC w/ good seal, no surrounding edema, appropriately tender to touch

## 2019-02-13 NOTE — PROGRESS NOTE ADULT - PROBLEM SELECTOR PLAN 6
HCP form completed: Daughter (Sandy Puente), named HCP. Co- Adriano Galan named as alternate proxy. Form placed in chart, copy provided to daughter.    -daughter's goal is to get patient to the rehab facility in [employer] that she works in. She acknowledges that her mother does not want medications but when asked about hospice she said she is not ready for that referral 30 minutes at bedside.  HCP form completed: Daughter (Sandy Puente), named HCP. Co- Adriano Galan named as alternate proxy. Form placed in chart, copy provided to daughter.    -daughter's goal is to get patient to the rehab facility in [place] that she works in. She acknowledges that her mother does not want medications but when asked about hospice she said she is not ready for that referral

## 2019-02-13 NOTE — PROGRESS NOTE ADULT - PROBLEM SELECTOR PLAN 3
- Presents with R fungating breast mass, + Malodorous, + Drainage found to have likely mets to brain and lungs  - s/p palliative total mastectomy of R breast on 2/11; wound vac in place  - mets to brain, adrenals, lung, .  -Per CTA chest, neg for PE though there is mass effect on RUL pulmonary artery. Currently breathing on RA.  - oxycodone 2.5mg q6h prn for pain  - f/u surg recs

## 2019-02-13 NOTE — PROVIDER CONTACT NOTE (OTHER) - ASSESSMENT
Patient is asymptomatic. No signs of acute distress noted. Patient does not complain of any pain or headache.

## 2019-02-13 NOTE — PROGRESS NOTE ADULT - PROBLEM SELECTOR PLAN 5
As per primary team, patient had previously expressed an interest into no longer pursuing medical treatment, potential hospice. Upon questioning the patient, she is deferring any such decision to her daughter. It has been stated that any treatment offered would be palliative, not curative, in nature

## 2019-02-13 NOTE — PROGRESS NOTE ADULT - PROBLEM SELECTOR PLAN 4
Highly concerning for malignancy with potential spread of disease to lungs/LN/brain but no definitive tissue biopsy at this time.  -mastectomy will provide tissue diagnosis to potentially pursue DMT/RT

## 2019-02-13 NOTE — PROVIDER CONTACT NOTE (OTHER) - ASSESSMENT
Patient is unresponsive to voice, painful stimuli. Patient BP is 188/112 upon assessment, HR is 109 at 0657. Hydralazine 5mg given. BP went down to 145/102 at 0703. Hydralaine 5mg given again at 0708. BP went down to 135/95. Patient with seizure like activity. Ativan 2mg given at 0713. blood sugar was 79 at this time as well.

## 2019-02-13 NOTE — PROGRESS NOTE ADULT - SUBJECTIVE AND OBJECTIVE BOX
PLASTIC SURGERY DAILY PROGRESS NOTE:     Subjective: Patient found breathing but unresponsive this AM, CODE STROKE called, Neuro evaluating, CT head ordered. Patient was stable/responsive just before this episode.     Objective:    MEDICATIONS  (STANDING):  amLODIPine   Tablet 5 milliGRAM(s) Oral daily  ceFAZolin   IVPB 2000 milliGRAM(s) IV Intermittent every 8 hours  enoxaparin Injectable 40 milliGRAM(s) SubCutaneous daily  lactated ringers. 1000 milliLiter(s) (75 mL/Hr) IV Continuous <Continuous>  lactobacillus acidophilus 1 Tablet(s) Oral every 12 hours  levETIRAcetam  IVPB 1000 milliGRAM(s) IV Intermittent once  levETIRAcetam  IVPB 500 milliGRAM(s) IV Intermittent every 12 hours  LORazepam   Injectable 2 milliGRAM(s) IV Push once  oxyCODONE    IR 2.5 milliGRAM(s) Oral every 8 hours  senna 2 Tablet(s) Oral at bedtime    MEDICATIONS  (PRN):  acetaminophen   Tablet .. 650 milliGRAM(s) Oral every 6 hours PRN Mild Pain (1 - 3)  oxyCODONE    IR 5 milliGRAM(s) Oral every 6 hours PRN Severe Pain (7 - 10)  oxyCODONE    IR 2.5 milliGRAM(s) Oral every 6 hours PRN Moderate Pain (4 - 6)  polyethylene glycol 3350 17 Gram(s) Oral daily PRN Constipation      Vital Signs Last 24 Hrs  T(C): 37 (2019 05:07), Max: 37 (2019 05:07)  T(F): 98.6 (2019 05:07), Max: 98.6 (2019 05:07)  HR: 104 (2019 06:24) (65 - 975)  BP: 172/104 (2019 06:24) (153/87 - 175/108)  BP(mean): --  RR: 18 (2019 05:07) (18 - 18)  SpO2: 95% (2019 05:07) (91% - 95%)    I&O's Detail    2019 07:01  -  2019 07:00  --------------------------------------------------------  IN:    lactated ringers.: 1000 mL    Oral Fluid: 400 mL    Solution: 100 mL  Total IN: 1500 mL    OUT:    Voided: 500 mL  Total OUT: 500 mL    Total NET: 1000 mL      2019 07:01  -  2019 08:18  --------------------------------------------------------  IN:  Total IN: 0 mL    OUT:    Voided: 400 mL  Total OUT: 400 mL    Total NET: -400 mL      PE:  Gen: Unresponsive to painful stimulus  Resp: nonlabored  HEENT: Eyes with right sided deviation  R Breast: wound vac holding suction well, intact     Daily     Daily Weight in k.6 (2019 05:15)    LABS:                                          7.8    15.87 )-----------( 298      ( 2019 06:15 )             27.1   -12    140  |  101  |  13  ----------------------------<  88  3.9   |  26  |  0.35<L>    Ca    8.3<L>      2019 06:15  Phos  1.9       Mg     2.1         TPro  5.4<L>  /  Alb  2.0<L>  /  TBili  < 0.2<L>  /  DBili  x   /  AST  53<H>  /  ALT  8   /  AlkPhos  313<H>

## 2019-02-13 NOTE — PROVIDER CONTACT NOTE (OTHER) - ASSESSMENT
Patient claims she is uncomfortable due to the wound vac on her right chest. Patient is assessed for pain and she states that " I just feel uncomfortable since this wound vac has been placed" She also refuses pain medications.

## 2019-02-13 NOTE — CONSULT NOTE ADULT - ATTENDING COMMENTS
Rt breast fungating mass, lung mets, leukocytosis  -DC abx  -leukocytosis reactive to CA/mets  -f/u cx  -cancer workup per primary/onc teams  -no fever    Hugh Dias  Attending Physician   Division of Infectious Disease  Pager #[phone]  After 5pm/weekend or no response, call #[phone]    Please call the ID service [phone] with questions or concerns over the weekend.
- I have seen and examined the patient on rounds. Patient's chart, labs, images and reports reviewed.  Pt with large fungating fixed R breast mass with suspected metastasis to lung  Pt refusing to formally examine   Pt refusing to have a surgeon be involved and she denies to have surgical treatment at this point  Discussed the plan with primary team   Will sign off at this point given pt wishes not to undergo any surgery and even denying to have a biopsy.
Fungating breast mass planned for right mastectomy.  Metastatic lesions noted in lung along with mediastinal/hilar lymphadenopathy.  Patient is optimized from pulmonary perspective for planned surgery.   Janay-operative precautions as detailed above.  Can consider CPAP for airway stenting post-operative if wheezing is noted.  Please call with questions.
Pt examined, chart reviewed and case discussed on rounds. MRI images reviewed. Agree with above Hx. Pt is currently post ictal and arousable but does not maintain wakefulness of communicate. There is a right gaze and head preference, flaccid upper and lower extremities with bilateral plantar extensor responses. This is likely a post ictal phenomenon.  Pt appears to have had a left versive seizure with secondary generalization. Agree with Keppra load and treatment.  Will follow clinically. VEEG monitoring if patient does not return to baseline.
Pt seen with resident.  Agree with above.  Presumed met breast cancer with fungating mass.  Symptom management with oxy ir prn and wound care daily.  Goals remain illusive as pt noncomittal to biopsy/ treatment.  Attempted to reach daughter who she designates as her hcp.  Await family meeting. If no treatment, would rec hospice.

## 2019-02-13 NOTE — PROGRESS NOTE ADULT - PROBLEM SELECTOR PLAN 3
Significantly improved s/p mastectomy  -c/w supportive care  -Oxy for pain control will also benefit

## 2019-02-13 NOTE — CONSULT NOTE ADULT - CONSULT REASON
Fungating breast mass
GOC
Pre-operative optimization for palliative mastectomy.
R fungating breast mass
brain lesions
code stroke, found to be seizing
wound management
Possible L parietal/occipital lesion on CT
fungating breast mass

## 2019-02-13 NOTE — PROGRESS NOTE ADULT - PROBLEM SELECTOR PLAN 1
Due to skin graft, requiring wound vac.  -will switch Oxy IR 2.5mg PO back to PRN for Moderate/Severe Pain given the patient's excessive drowsiness w/ Keppra loading  -d/c Oxy 5mg PO PRN  -Wound Vac as per Wound Care/Surgery

## 2019-02-13 NOTE — STROKE CODE NOTE - ABSOLUTE EXCLUSION OTHER CRITERIA
Patient was having a seizure, not a stroke. On exam had head deviation to the left with eye deviation to the left. Was saying a few words but not following commands. Then developed full GTC that self resolved after one minute.

## 2019-02-13 NOTE — CHART NOTE - NSCHARTNOTEFT_GEN_A_CORE
Extensive conversation despite after explaining during the GOC earlier today to the daughter about Keppra. The patient started waking up now and talking a little more and daugther wanted to hold the Keppra dose again. Explained the risk and benefits of holding Keppra and that she can have another seizure again. Agreeable for now and will continue Keppra at 500 for now.

## 2019-02-13 NOTE — CONSULT NOTE ADULT - ASSESSMENT
68 year old Female no previous known PMH presented with large R fungating breast mass with SOB and weight loss (25 lbs in 2 mths) x several weeks, found to have significant metastatic disease to lung, brain, and bone, also with symptomatic anemia, now status post palliative R mastectomy. Neurology consulted for acute change in mental status this morning that prompted a code stroke. On MRI, patient found to have multiple abnormal MRI lesions suspected to be secondary to metastatic disease. She was started on Keppra 500mg BID & Decadron 4mg q6h. Per chart, patient refused to take Keppra during admission. This morning around 6:45AM patient was acting her baseline self. She was conversing appropriately with the nurse and took her medication. At around 6:50AM she had acute change in mental status where her head deviated to the L and her eyes deviated to the left and she no longer followed commands. Code stroke was called. During examination, patient continued to have tonic head deviation to the left with eye deviation to the left. She was saying a few incomprehensible words but not following commands. She then developed full GTC with impaired awareness that self resolved after one minute. She was given 2mg Ativan. During the episode, patient not following commands - NIHSS 17. After the episode, patient became hypertensive. Since episode attributed to seizure and not to stroke, and since this patient is not a TPA candidate, decision made to defer CT scan until patient stabilized.    Patient's seizure semiology of tonic head turn to the left with gaze deviation to the left suggests focal onset with secondary generalization. This localizes to her right hemisphere and is likely caused by the one of her many brain lesions. This could be cortical irritation from brain lesion or patient may have developed a bleed. Recommend giving Keppra 1000mg bolus now and giving Keppra 500mg IV every 12 hours. Recommend CT head as soon as patient is stable.    Plan  - 1000 mg IV Keppra now  - Keppra 500mg IV BID  - CT head when stable

## 2019-02-13 NOTE — PROGRESS NOTE ADULT - PROBLEM SELECTOR PLAN 10
DVT PPx: lovenox 40mg QD   Diet: DASH diet  Dispo: PT recommending rehab upon discharge. From our standpoint, pt is medically stable for discharge. However, need to be sure pt is clear from surgical stand point and if pt can receive proper wound care resources at rehab. DVT PPx: lovenox 40mg QD   Diet: DASH diet  Dispo: PT recommending rehab upon discharge.

## 2019-02-13 NOTE — STROKE CODE NOTE - PATIENT LAST KNOWN WELL_DATE TIME
Source: Patient [x ]  Family [ ]   other [ ]    Current Diet: consistent CHO, low fiber with ensure clears TID    Patient reports [ ] nausea  [ ] vomiting [ ] diarrhea [ ] constipation  [ ]chewing problems [ ] swallowing issues  [ ] other:     PO intake:  < 50% [ ]   50-75%  [ ]   %  [x ]  other :    Source for PO intake [x ] Patient [x ] family [ ] chart [ ] staff [ ] other    Enteral /Parenteral Nutrition: TPN discontinued    Current Weight: no new weight    % Weight Change     Pertinent Medications: MEDICATIONS  (STANDING):  ALPRAZolam 0.25 milliGRAM(s) Oral every 6 hours  aspirin  chewable 81 milliGRAM(s) Oral daily  dextrose 5%. 1000 milliLiter(s) (50 mL/Hr) IV Continuous <Continuous>  dextrose 50% Injectable 12.5 Gram(s) IV Push once  dextrose 50% Injectable 25 Gram(s) IV Push once  diltiazem    milliGRAM(s) Oral daily  enoxaparin Injectable 40 milliGRAM(s) SubCutaneous daily  FIRST- Mouthwash  BLM 10 milliLiter(s) Swish and Spit two times a day  fluticasone propionate 50 MICROgram(s)/spray Nasal Spray 1 Spray(s) Both Nostrils two times a day  fluticasone propionate/ salmeterol 250-50 MICROgram(s) Diskus 1 Dose(s) Inhalation two times a day  guaiFENesin    Syrup 100 milliGRAM(s) Oral every 4 hours  levalbuterol Inhalation 0.63 milliGRAM(s) Inhalation every 6 hours  loratadine 10 milliGRAM(s) Oral daily  magnesium sulfate  IVPB 2 Gram(s) IV Intermittent once  multivitamin 1 Tablet(s) Oral daily  pantoprazole    Tablet 40 milliGRAM(s) Oral two times a day before meals  sodium chloride 0.65% Nasal 1 Spray(s) Both Nostrils four times a day  tamsulosin 0.4 milliGRAM(s) Oral at bedtime  tiotropium 18 MICROgram(s) Capsule 1 Capsule(s) Inhalation daily    MEDICATIONS  (PRN):  acetylcysteine 20% Inhalation 2.5 milliLiter(s) Inhalation every 6 hours PRN expectorant  benzocaine 15 mG/menthol 3.6 mG Lozenge 1 Lozenge Oral every 4 hours PRN Sore Throat  ondansetron Injectable 4 milliGRAM(s) IV Push every 4 hours PRN Nausea and/or Vomiting    Pertinent Labs: CBC Full  -  ( 05 Feb 2018 06:57 )  WBC Count : 6.3 K/uL  Hemoglobin : 8.1 g/dL  Hematocrit : 25.9 %  Platelet Count - Automated : 349 K/uL  Mean Cell Volume : 90.6 fl  Mean Cell Hemoglobin : 28.3 pg  Mean Cell Hemoglobin Concentration : 31.3 g/dL  Auto Neutrophil # : 4.5 K/uL  Auto Lymphocyte # : 1.1 K/uL  Auto Monocyte # : 0.4 K/uL  Auto Eosinophil # : 0.2 K/uL  Auto Basophil # : 0.0 K/uL  Auto Neutrophil % : 72.3 %  Auto Lymphocyte % : 17.3 %  Auto Monocyte % : 6.7 %  Auto Eosinophil % : 2.6 %  Auto Basophil % : 0.8 %      02-05 Na139 mmol/L Glu 133 mg/dL<H> K+ 4.1 mmol/L Cr  0.41 mg/dL<L> BUN 14.0 mg/dL Phos 2.9 mg/dL Alb 2.5 g/dL<L> PAB 16 mg/dL<L>         Skin:     Nutrition focused physical exam conducted - found signs of malnutrition [ ]absent [ x]present    Subcutaneous fat loss: [x ] Orbital fat pads region, [ ]Buccal fat region, [ ]Triceps region,  [x ]Ribs region    Muscle wasting: [ ]Temples region, [x ]Clavicle region, [ ]Shoulder region, x[ ]Scapula region, [ ]Interosseous region,  [ ]thigh region, [ ]Calf region    Estimated Needs:   [x ] no change since previous assessment  [ ] recalculated:     Current Nutrition Diagnosis:  Pt meets criteria for severe chronic malnutrition related to absorption issues, in setting of ileostomy as evidenced by high ostomy output, 36# weight loss since admission 11/29, severe fat/ muscle depletion in thoracic region, thighs, temples, clavicles. Pt with s/p ileostomy revision. Diet restarted and pt tolerating well ~% as per pt.   TPN now discontinued.  Ileostomy functioning well with formed stools.  Discussed diet and menu with pt and wife at length. Pt with continued improved appetite. I Monitor po intake, tolerance.      Recommendations: Continue Ensure clears TID    Monitoring and Evaluation:   [x PO intake [x ] Tolerance to diet prescription [X] Weights  [X] Follow up per protocol [X] Labs: 13-Feb-2019

## 2019-02-13 NOTE — GOALS OF CARE CONVERSATION - PERSONAL ADVANCE DIRECTIVE - CONVERSATION DETAILS
Spoke at length w/ pt's health care proxies (daughter Sandy &  Adriano Adama) regarding events that happened this morning. Explained that patient had a seizure likely from her brain metastases and not taking adequate levels of Keppra. Sandy is an RN at outside facility and shows very good understanding of pt situation. She clarified that the decisions she makes are based on what her mother would want in this situation. For example, daughter has been refusing Keppra and steroids occasionally because she believes her mother would not want them. She reiterated that pt is a deeply [employer] individual who has always believed that God will take care of her. Pt has previously endorsed to her that she experienced her  undergo CPR and that was a traumatic experience for the pt. Based on this, daughter and pt's  believe in the current situation, pt would not want intubation or chest compressions, that pt would prefer to go naturally and in God's hands.    Shortly after conversation, MOLST form completed and made DNR/DNI.

## 2019-02-13 NOTE — PROGRESS NOTE ADULT - SUBJECTIVE AND OBJECTIVE BOX
SUBJECTIVE AND OBJECTIVE  INTERVAL HPI/OVERNIGHT EVENTS:  seizure this AM, keppra loaded. reportedly has been more sedate the last few days. patient lethargic but arousible. daughter at bedside very frustrated about how things have turned out. She says her mother never wanted the Keppra and she feels guilty for bringing to the hospital because her mother never wanted medical care/"chemicals".    DNR on chart: X    Allergies    No Known Allergies    Intolerances    MEDICATIONS  (STANDING):  amLODIPine   Tablet 5 milliGRAM(s) Oral daily  ceFAZolin   IVPB 2000 milliGRAM(s) IV Intermittent every 8 hours  enoxaparin Injectable 40 milliGRAM(s) SubCutaneous daily  lactated ringers. 1000 milliLiter(s) (75 mL/Hr) IV Continuous <Continuous>  lactobacillus acidophilus 1 Tablet(s) Oral every 12 hours  levETIRAcetam  IVPB 500 milliGRAM(s) IV Intermittent every 12 hours  oxyCODONE    IR 2.5 milliGRAM(s) Oral every 8 hours  senna 2 Tablet(s) Oral at bedtime    MEDICATIONS  (PRN):  acetaminophen   Tablet .. 650 milliGRAM(s) Oral every 6 hours PRN Mild Pain (1 - 3)  oxyCODONE    IR 5 milliGRAM(s) Oral every 6 hours PRN Severe Pain (7 - 10)  oxyCODONE    IR 2.5 milliGRAM(s) Oral every 6 hours PRN Moderate Pain (4 - 6)  polyethylene glycol 3350 17 Gram(s) Oral daily PRN Constipation      ITEMS UNCHECKED ARE NOT PRESENT    PRESENT SYMPTOMS: [x]Unable to obtain due to poor mentation   Source if other than patient:  [ ]Family   [ ]Team     Pain (Impact on QOL):  X  Location:  Minimal acceptable level (0-10 scale):            Aggravating factors:  Quality:  Radiation:  Severity (0-10 scale):    Timing:    Dyspnea:                           [ ]Mild [ ]Moderate [ ]Severe  Anxiety:                             [ ]Mild [ ]Moderate [ ]Severe  Fatigue:                             [ ]Mild [x]Moderate [ ]Severe  Nausea:                             [ ]Mild [ ]Moderate [ ]Severe  Loss of appetite:              [ ]Mild [ ]Moderate [ ]Severe  Constipation:                    [ ]Mild [ ]Moderate [ ]Severe    PAIN AD Score:	  http://geriatrictoolkit.[place].[facility]/cog/painad.pdf (Ctrl + left click to view)    Other Symptoms:  [x]All other review of systems negative     Karnofsky Performance Score/Palliative Performance Status Version 2:        30%    http://palliative.info/resource_material/PPSv2.pdf    PHYSICAL EXAM:  Vital Signs Last 24 Hrs  T(C): 36.3 (13 Feb 2019 13:55), Max: 37.7 (13 Feb 2019 09:00)  T(F): 97.4 (13 Feb 2019 13:55), Max: 99.9 (13 Feb 2019 09:00)  HR: 100 (13 Feb 2019 14:48) (65 - 975)  BP: 125/79 (13 Feb 2019 14:48) (110/76 - 175/108)  BP(mean): --  RR: 18 (13 Feb 2019 14:48) (18 - 20)  SpO2: 100% (13 Feb 2019 14:48) (91% - 100%) I&O's Summary    12 Feb 2019 07:01  -  13 Feb 2019 07:00  --------------------------------------------------------  IN: 1500 mL / OUT: 500 mL / NET: 1000 mL    13 Feb 2019 07:01  -  13 Feb 2019 14:53  --------------------------------------------------------  IN: 0 mL / OUT: 400 mL / NET: -400 mL        GENERAL:  [ ]Alert  [ ]Oriented x   [x]Lethargic  [ ]Cachexia  [ ]Unarousable  [ ]Verbal  [ ]Non-Verbal    Behavioral:   [ ] Anxiety  [ ] Delirium [ ] Agitation [ ] Other    HEENT:  [ ]Normal   [ ]Dry mouth   [ ]ET Tube/Trach  [ ]Oral lesions    PULMONARY:   [x]Clear [ ]Tachypnea  [ ]Audible excessive secretions   [ ]Rhonchi        [ ]Right [ ]Left [ ]Bilateral  [ ]Crackles        [ ]Right [ ]Left [ ]Bilateral  [ ]Wheezing     [ ]Right [ ]Left [ ]Bilateral    CARDIOVASCULAR:    [x]Regular [ ]Irregular [ ]Tachy  [ ]Aly [ ]Murmur [ ]Other    GASTROINTESTINAL:  [x]Soft  [ ]Distended   [x]+BS  [x]Non tender [ ]Tender  [ ]PEG [ ]OGT/ NGT   Last BM:    GENITOURINARY:  [x]Normal [ ] Incontinent   [ ]Oliguria/Anuria   [ ]Waller    MUSCULOSKELETAL:   [x]Normal   [ ]Weakness  [ ]Bed/Wheelchair bound [ ]Edema    NEUROLOGIC:   [x]No focal deficits  [ ] Cognitive impairment  [ ] Dysphagia [ ]Dysarthria [ ] Paresis [ ]Other     SKIN:   [ ]Normal   [ ]Pressure ulcer(s)  [ ]Rash [x] R leg wound vac    CRITICAL CARE:  [ ] Shock Present  [ ] Septic [ ] Cardiogenic [ ] Neurologic [ ] Hypovolemic [ ] Vasopressors [ ] Inotropes   [ ] Respiratory failure present  [ ] Acute [ ] Chronic [ ] Hypoxic [ ] Hypercarbic [ ] Other  [ ] Other organ failure     LABS:                        7.8    15.87 )-----------( 298      ( 12 Feb 2019 06:15 )             27.1   02-13    135  |  99  |  7   ----------------------------<  59<L>  4.7   |  21<L>  |  0.25<L>    Ca    8.0<L>      13 Feb 2019 10:25  Phos  2.5     02-13  Mg     1.9     02-13    TPro  5.4<L>  /  Alb  2.0<L>  /  TBili  < 0.2<L>  /  DBili  x   /  AST  53<H>  /  ALT  8   /  AlkPhos  313<H>  02-12        RADIOLOGY & ADDITIONAL STUDIES:    Protein Calorie Malnutrition Present: [x] yes [ ] no  [ ] PPSV2 < or = 30%  [ ] significant weight loss [x] poor nutritional intake [ ] anasarca [ ] catabolic state Albumin, Serum: 2.0 g/dL (02-12-19 @ 06:15)  Artificial Nutrition [ ]     REFERRALS:   [ ]Chaplaincy  [ ] Hospice  [ ]Child Life  [ ]Social Work  [ ]Case management [ ]Holistic Therapy   Goals of Care Document: Goals of Care Conversation - Personal Advance Directive [GISELA Aguirre] (02-05-19 @ 17:02)      Anselmo Lee, PGY-3 Pager#[phone]  ***To Be Reviewed with Attending

## 2019-02-13 NOTE — PROGRESS NOTE ADULT - PROBLEM SELECTOR PLAN 1
- Patient wants daughter (Sandy) and co- (Adriano Galan) to be her HCP  - Full code. Has capacity. Patient is  in [employer], has strong mariola that God will heal her  - Palliative care on board  - Had GOC conversation 2/5 (see GOC note). Pt still ambivalent about speaking with medical oncology. She does not want to receive chemotherapy.  - will continue to have ongoing GOC discussions w/ pt & HCPs. Pt and daughter have been considering hospice recently. Will re-discuss in light of recent seizure and will also re-visit code status.  - agrees to blood transfusions.

## 2019-02-13 NOTE — CONSULT NOTE ADULT - PROVIDER SPECIALTY LIST ADULT
Heme/Onc
Neurology
Palliative Care
Plastic Surgery
Pulmonology
Rad Onc
Surgery
Neurosurgery
Infectious Disease

## 2019-02-13 NOTE — PROGRESS NOTE ADULT - ASSESSMENT
68 year old female with R fungating breast mass and metastatic disease to lung, brain, and bone s/p resection on 2/11/19.     PLAN:  - F/u AMS w/u  - VAC per PRS  - Care per primary     Surgical Oncology / D team surgery  Pager [phone]

## 2019-02-13 NOTE — CONSULT NOTE ADULT - SUBJECTIVE AND OBJECTIVE BOX
HPI:  68 year old Female no previous known PMH presented with large R fungating breast mass with SOB and weight loss (25 lbs in 2 mths) x several weeks, found to have significant metastatic disease to lung, brain, and bone, also with symptomatic anemia, now status post palliative R mastectomy. Neurology consulted for acute change in mental status this morning that prompted a code stroke.    Per chart, patient initially refused treatment. She eventually decided on 19 that she wanted a mastectomy. Radiation oncology also evaluated pt. Offered whole brain radiation, however a breast biopsy was necessary prior to proceeding. Patient status post R radical palliative mastectomy on 19.    Patient being followed by Palliative Care, Radiation Oncology, General Surgery, Plastic Surgery, and Hematology/Oncology. Patient is full code.    On MRI, patient found to have multiple abnormal MRI lesions suspected to be secondary to metastatic disease. She was started on Keppra 500mg BID & Decadron 4mg q6h. Per chart, patient refused to take Keppra during admission. This morning around 6:45AM patient was acting her baseline self. She was conversing appropriately with the nurse and took her medication. At around 6:50AM she had acute change in mental status where her head deviated to the L and her eyes deviated to the left and she no longer followed commands. Code stroke was called. During examination, patient continued to have tonic head deviation to the left with eye deviation to the left. She was saying a few incomprehensible words but not following commands. She then developed full GTC with impaired awareness that self resolved after one minute. She was given 2mg Ativan. During the episode, patient not following commands - NIHSS 17. After the episode, patient became hypertensive. Since episode attributed to seizure and not to stroke, and since this patient is not a TPA candidate, decision made to defer CT scan until patient stabilized.    REVIEW OF SYSTEMS:  Unable to obtain due to patient condition    PAST MEDICAL & SURGICAL HISTORY:  No known past medical history prior to this hospitalization  R radical palliative mastectomy on 19.      MEDICATIONS  (STANDING):  amLODIPine   Tablet 5 milliGRAM(s) Oral daily  ceFAZolin   IVPB 2000 milliGRAM(s) IV Intermittent every 8 hours  enoxaparin Injectable 40 milliGRAM(s) SubCutaneous daily  lactated ringers. 1000 milliLiter(s) (75 mL/Hr) IV Continuous <Continuous>  lactobacillus acidophilus 1 Tablet(s) Oral every 12 hours  levETIRAcetam  IVPB 500 milliGRAM(s) IV Intermittent every 12 hours  LORazepam   Injectable 2 milliGRAM(s) IV Push once  oxyCODONE    IR 2.5 milliGRAM(s) Oral every 8 hours  senna 2 Tablet(s) Oral at bedtime    MEDICATIONS  (PRN):  acetaminophen   Tablet .. 650 milliGRAM(s) Oral every 6 hours PRN Mild Pain (1 - 3)  oxyCODONE    IR 5 milliGRAM(s) Oral every 6 hours PRN Severe Pain (7 - 10)  oxyCODONE    IR 2.5 milliGRAM(s) Oral every 6 hours PRN Moderate Pain (4 - 6)  polyethylene glycol 3350 17 Gram(s) Oral daily PRN Constipation    Allergies  No Known Allergies    FAMILY HISTORY:  Family history of pancreatic cancer (Father)  Family history of breast cancer in sister    Vital Signs Last 24 Hrs  T(C): 37.7 (2019 09:00), Max: 37.7 (2019 09:00)  T(F): 99.9 (2019 09:00), Max: 99.9 (2019 09:00)  HR: 110 (2019 09:00) (65 - 975)  BP: 110/76 (2019 09:00) (110/76 - 175/108)  BP(mean): --  RR: 20 (2019 09:00) (18 - 20)  SpO2: 100% (2019 09:00) (91% - 100%)  Daily Weight in k.2 (2019 05:07)      GENERAL PHYSICAL EXAM  ** Patient examined after 2mg given of Ativan**  General:        Laying in bed, not following commands  HEENT:         Normocephalic, atraumatic  CV:               Tachycardic, warm and well perfused.  Respiratory:   Even, nonlabored breathing     NEUROLOGIC EXAM  Mental Status:     Does not follow commands, not responsive to name, some eye blinking noted.   Cranial Nerves:    EOMI, no gross facial asymmetry  Muscle Strength:  Moves extremities equally against gravity  Sensation:            Did not assess due to patient condition  Coordination:       Did not assess due to patient condition  Gait:                    Did not assess due to patient condition    Lab Results:                        7.8    15.87 )-----------( 298      ( 2019 06:15 )             27.1     02-13    129<L>  |  96<L>  |  7   ----------------------------<  53<L>  5.8<H>   |  22  |  0.21<L>    Ca    7.9<L>      2019 06:40  Phos  2.8       Mg     2.0         TPro  5.4<L>  /  Alb  2.0<L>  /  TBili  < 0.2<L>  /  DBili  x   /  AST  53<H>  /  ALT  8   /  AlkPhos  313<H>      LIVER FUNCTIONS - ( 2019 06:15 )  Alb: 2.0 g/dL / Pro: 5.4 g/dL / ALK PHOS: 313 u/L / ALT: 8 u/L / AST: 53 u/L / GGT: x           Imaging Studies:  MR Head w/wo IV Cont (19 @ 19:34)  Numerous enhancing lesions are seen throughout the posterior fossa and supratentorial region. Approximately 4 lesions are seen in the posterior fossa and 11 lesions are seen involving the supratentorial region. The posterior fossa lesions are seen in the bilateral cerebellar region. The largest of these lesions are seen involving the inferior lateral aspect of the right cerebellum and measures approximately 2.0 x 1.0 cm. This lesion is contiguous with a second lesion. A large peripherally enhancing lesion with surrounding edema is seen involving the left occipital cortical and subcortical region. This lesion measures approximately 1.3 x 1.3 cm. Other supratentorial lesions are seen involving the right middle cranial fossa, medial to the atrium of the left lateral ventricle, dural based lesion in the medial left occipital lobe, inferior left frontal, right posterior frontal subcortical, left posterior frontal subcortical, anterior interhemispheric, superior right posterior frontal, superior medial right posterior frontal, supratentorial left posterior frontal and right parietal region.    Heterogeneous signal is seen throughout the calvarium clivus and visualized cervical spine which is likely compatible with an osseous metastasis.  Prominent extra calvarial soft tissue lesion is seen involving the left vertex region. This demonstrates abnormal enhancement and is likely compatible with an underlying metastasis as well.  Parenchymal volume loss and chronic microvascular ischemic changes are identified  There is no evidence acute hemorrhage in posterior fossa or supratentorial  Evaluation of the diffusion weighted sequence demonstrates no abnormal areas of restricted diffusion to suggest acute infarct.  Aside from the left occipital region the rest of the dura demonstrates smooth enhancement which could be secondary to prior procedure such as lumbar puncture. Please correlate clinically.

## 2019-02-13 NOTE — PROGRESS NOTE ADULT - PROBLEM SELECTOR PLAN 2
- GTC x 1 this AM that resolved w/ administration of Ativan 2mg IV  - Given Keppra IV 1000mg load x1 this AM and now Keppra 500mg IV bid  - CT head re-demonstrates known metastases, no significant edema noted. No acute intracranial hemorrhage. Will hold off steroids for now. - GTC x 1 this AM that resolved w/ administration of Ativan 2mg IV  - Given Keppra IV 1000mg load x1 this AM and now Keppra 500mg IV bid  - CT head re-demonstrates known metastases, no significant edema noted. No acute intracranial hemorrhage.   - will discuss management goals with patient once no longer post-ictal and with daughter; if patient continues to decline AEDs, will be at increased risk of recurrent seizure, aspiration, or other adverse effect of seizure

## 2019-02-13 NOTE — CONSULT NOTE ADULT - CONSULT REQUESTED BY NAME
Ambrose Galindo
Dr. Galindo
Dr. Pizarro
Dr. Pizarro
Internal Medicine
Mary Lou
Mateo
Dr. Abad
Ibrrikkiem

## 2019-02-13 NOTE — PROGRESS NOTE ADULT - ASSESSMENT
68yoF with poor medical care and no known PMH presents with multiple months of growing, fungating, malodorous right breast mass, fall and dizziness. Likely metastatic breast cancer. Found to be severely anemic on lab work, now more stable. MRI head 2/5 demonstrates many metastatic lesions within the brain. 2/11 pt s/p palliative mastectomy. Was recovering well, however pt had GTC 2/13 in AM most likely from known brain mets & non-compliance w/ Keppra. 68yoF with poor medical care and no known PMH presents with multiple months of growing, fungating, malodorous right breast mass, fall and dizziness. Likely metastatic breast cancer. Found to be severely anemic on lab work, now more stable. MRI head 2/5 demonstrates many metastatic lesions within the brain. 2/11 pt s/p palliative mastectomy. Was recovering well, however pt had GTC sz 2/13 in AM most likely from known brain mets & non-adherence to Keppra.

## 2019-02-14 LAB — SURGICAL PATHOLOGY STUDY: SIGNIFICANT CHANGE UP

## 2019-02-14 PROCEDURE — 99233 SBSQ HOSP IP/OBS HIGH 50: CPT | Mod: GC

## 2019-02-14 RX ORDER — LEVETIRACETAM 250 MG/1
500 TABLET, FILM COATED ORAL
Qty: 0 | Refills: 0 | Status: DISCONTINUED | OUTPATIENT
Start: 2019-02-14 | End: 2019-02-14

## 2019-02-14 RX ORDER — LEVETIRACETAM 250 MG/1
500 TABLET, FILM COATED ORAL
Qty: 0 | Refills: 0 | Status: DISCONTINUED | OUTPATIENT
Start: 2019-02-14 | End: 2019-02-16

## 2019-02-14 RX ADMIN — LEVETIRACETAM 400 MILLIGRAM(S): 250 TABLET, FILM COATED ORAL at 06:56

## 2019-02-14 RX ADMIN — Medication 1 TABLET(S): at 17:41

## 2019-02-14 RX ADMIN — AMLODIPINE BESYLATE 5 MILLIGRAM(S): 2.5 TABLET ORAL at 06:09

## 2019-02-14 RX ADMIN — Medication 1 TABLET(S): at 10:13

## 2019-02-14 RX ADMIN — LEVETIRACETAM 500 MILLIGRAM(S): 250 TABLET, FILM COATED ORAL at 17:41

## 2019-02-14 RX ADMIN — ENOXAPARIN SODIUM 40 MILLIGRAM(S): 100 INJECTION SUBCUTANEOUS at 20:25

## 2019-02-14 NOTE — PROGRESS NOTE ADULT - SUBJECTIVE AND OBJECTIVE BOX
Neurology Follow up note    Name  TOMEKA PEREZ        Subjective: Saw and examined patient at bedside. In no acute distress    MEDICATIONS  (STANDING):  amLODIPine   Tablet 5 milliGRAM(s) Oral daily  enoxaparin Injectable 40 milliGRAM(s) SubCutaneous daily  lactated ringers. 1000 milliLiter(s) (75 mL/Hr) IV Continuous <Continuous>  lactobacillus acidophilus 1 Tablet(s) Oral every 12 hours  levETIRAcetam  IVPB 500 milliGRAM(s) IV Intermittent every 12 hours  senna 2 Tablet(s) Oral at bedtime    MEDICATIONS  (PRN):  acetaminophen   Tablet .. 650 milliGRAM(s) Oral every 6 hours PRN Mild Pain (1 - 3)  oxyCODONE    IR 2.5 milliGRAM(s) Oral every 6 hours PRN Moderate and Severe Pain (4 - 10)  polyethylene glycol 3350 17 Gram(s) Oral daily PRN Constipation      Allergies    No Known Allergies    Intolerances        Objective:   Vital Signs Last 24 Hrs  T(C): 36.9 (14 Feb 2019 09:00), Max: 37.3 (14 Feb 2019 00:00)  T(F): 98.5 (14 Feb 2019 09:00), Max: 99.1 (14 Feb 2019 00:00)  HR: 101 (14 Feb 2019 09:00) (100 - 105)  BP: 140/85 (14 Feb 2019 09:00) (118/84 - 146/89)  BP(mean): --  RR: 20 (14 Feb 2019 09:00) (18 - 20)  SpO2: 99% (14 Feb 2019 09:00) (92% - 100%)    General Exam:   General appearance: No acute distress                   Neurological Exam:  Mental Status: awake, follows some commands    Cranial Nerves: EOMI, PERRL, facial symmetry intact, mild dysarthria    Motor: moves extremities spontaneously    Sensation: Intact to LT throughout    downgoing toes b/l    Other:    02-13    135  |  99  |  7   ----------------------------<  59<L>  4.7   |  21<L>  |  0.25<L>    Ca    8.0<L>      13 Feb 2019 10:25  Phos  2.5     02-13  Mg     1.9     02-13      02-13    135  |  99  |  7   ----------------------------<  59<L>  4.7   |  21<L>  |  0.25<L>    Ca    8.0<L>      13 Feb 2019 10:25  Phos  2.5     02-13  Mg     1.9     02-13          Radiology    CTH IMPRESSION:     Stable examination without evidence of acute intracranial hemorrhage.    MRI brain:   Abnormal area of low-attenuation involving the inferior left parietal   region is seen which measures approximately 2.3 x 1.5 x 0.8 cm. This   finding corresponds to a peripherally enhancing lesion seen on prior MRI.   Again noted are scattered bilateral cerebral and cerebellar   hypodensities, representing metastatic lesions, which are better   appreciated on recent MR brain 2/5/2019. There is no CT evidence of acute   hemorrhage.    No abnormal extra-axial collection, mass effect, or midline shift. The   basal cisterns are patent. No evidence of central herniation.     Mild cerebral volume loss with proportional prominence of the sulci and   ventricles. There is periventricular patchy white matter hypoattenuation   which is nonspecific in etiology but likely related to chronic   microvascular ischemic disease.    The visualized paranasal sinuses are clear. The mastoid air cells and   middle ear cavities are clear. Neurology Follow up note    Name  TOMEKA PEREZ        Subjective: Saw and examined patient at bedside. In no acute distress    MEDICATIONS  (STANDING):  amLODIPine   Tablet 5 milliGRAM(s) Oral daily  enoaparin Injectable 40 milliGRAM(s) SubCutaneous daily  lactated ringers. 1000 milliLiter(s) (75 mL/Hr) IV Continuous <Continuous>  lactobacillus acidophilus 1 Tablet(s) Oral every 12 hours  levETIRAcetam  IVPB 500 milliGRAM(s) IV Intermittent every 12 hours  senna 2 Tablet(s) Oral at bedtime    MEDICATIONS  (PRN):  acetaminophen   Tablet .. 650 milliGRAM(s) Oral every 6 hours PRN Mild Pain (1 - 3)  oxyCODONE    IR 2.5 milliGRAM(s) Oral every 6 hours PRN Moderate and Severe Pain (4 - 10)  polyethylene glycol 3350 17 Gram(s) Oral daily PRN Constipation      Allergies    No Known Allergies    Intolerances        Objective:   Vital Signs Last 24 Hrs  T(C): 36.9 (14 Feb 2019 09:00), Max: 37.3 (14 Feb 2019 00:00)  T(F): 98.5 (14 Feb 2019 09:00), Max: 99.1 (14 Feb 2019 00:00)  HR: 101 (14 Feb 2019 09:00) (100 - 105)  BP: 140/85 (14 Feb 2019 09:00) (118/84 - 146/89)  BP(mean): --  RR: 20 (14 Feb 2019 09:00) (18 - 20)  SpO2: 99% (14 Feb 2019 09:00) (92% - 100%)    General Exam:   General appearance: No acute distress                   Neurological Exam:  Mental Status: awake, follows some commands    Cranial Nerves: EOMI, PERRL, facial symmetry intact, mild dysarthria    Motor: moves extremities spontaneously    Sensation: Intact to LT throughout    downgoing toes b/l    Other:    02-13    135  |  99  |  7   ----------------------------<  59<L>  4.7   |  21<L>  |  0.25<L>    Ca    8.0<L>      13 Feb 2019 10:25  Phos  2.5     02-13  Mg     1.9     02-13      02-13    135  |  99  |  7   ----------------------------<  59<L>  4.7   |  21<L>  |  0.25<L>    Ca    8.0<L>      13 Feb 2019 10:25  Phos  2.5     02-13  Mg     1.9     02-13          Radiology    CTH IMPRESSION:     Stable examination without evidence of acute intracranial hemorrhage.    MRI brain:   Abnormal area of low-attenuation involving the inferior left parietal   region is seen which measures approximately 2.3 x 1.5 x 0.8 cm. This   finding corresponds to a peripherally enhancing lesion seen on prior MRI.   Again noted are scattered bilateral cerebral and cerebellar   hypodensities, representing metastatic lesions, which are better   appreciated on recent MR brain 2/5/2019. There is no CT evidence of acute   hemorrhage.    No abnormal extra-axial collection, mass effect, or midline shift. The   basal cisterns are patent. No evidence of central herniation.     Mild cerebral volume loss with proportional prominence of the sulci and   ventricles. There is periventricular patchy white matter hypoattenuation   which is nonspecific in etiology but likely related to chronic   microvascular ischemic disease.    The visualized paranasal sinuses are clear. The mastoid air cells and   middle ear cavities are clear.

## 2019-02-14 NOTE — PROGRESS NOTE ADULT - PROBLEM SELECTOR PLAN 2
- GTC x 1 this AM that resolved w/ administration of Ativan 2mg IV  - Given Keppra IV 1000mg load x1 this AM and now Keppra 500mg IV bid  - CT head re-demonstrates known metastases, no significant edema noted. No acute intracranial hemorrhage.   - will discuss management goals with patient once no longer post-ictal and with daughter; if patient continues to decline AEDs, will be at increased risk of recurrent seizure, aspiration, or other adverse effect of seizure - GTC x 1 2/13 that resolved w/ administration of Ativan 2mg IV  - Keppra 500mg PO bid  - CT head re-demonstrates known metastases, no significant edema noted. No acute intracranial hemorrhage.   - discussed importance of Keppra compliance w/ daughter. Currently ok with PO Keppra, will continue to offer to pt (which pt has right to decline as long as she is alert/oriented).

## 2019-02-14 NOTE — PROGRESS NOTE ADULT - SUBJECTIVE AND OBJECTIVE BOX
SURGERY DAILY PROGRESS NOTE:       SUBJECTIVE/ROS: Patient examined at bedside. Seizure activity since yesterday morning. GOC discussion.          MEDICATIONS  (STANDING):  amLODIPine   Tablet 5 milliGRAM(s) Oral daily  enoxaparin Injectable 40 milliGRAM(s) SubCutaneous daily  lactated ringers. 1000 milliLiter(s) (75 mL/Hr) IV Continuous <Continuous>  lactobacillus acidophilus 1 Tablet(s) Oral every 12 hours  levETIRAcetam  IVPB 500 milliGRAM(s) IV Intermittent every 12 hours  senna 2 Tablet(s) Oral at bedtime    MEDICATIONS  (PRN):  acetaminophen   Tablet .. 650 milliGRAM(s) Oral every 6 hours PRN Mild Pain (1 - 3)  oxyCODONE    IR 2.5 milliGRAM(s) Oral every 6 hours PRN Moderate and Severe Pain (4 - 10)  polyethylene glycol 3350 17 Gram(s) Oral daily PRN Constipation      OBJECTIVE:    Vital Signs Last 24 Hrs  T(C): 36.9 (2019 09:00), Max: 37.3 (2019 00:00)  T(F): 98.5 (2019 09:00), Max: 99.1 (2019 00:00)  HR: 101 (2019 09:00) (100 - 105)  BP: 140/85 (2019 09:00) (118/84 - 146/89)  BP(mean): --  RR: 20 (2019 09:00) (18 - 20)  SpO2: 99% (2019 09:00) (92% - 100%)        I&O's Detail    2019 07:01  -  2019 07:00  --------------------------------------------------------  IN:    lactated ringers.: 1000 mL  Total IN: 1000 mL    OUT:    VAC (Vacuum Assisted Closure) System: 350 mL    Voided: 400 mL  Total OUT: 750 mL    Total NET: 250 mL          Daily     Daily Weight in k.3 (2019 05:03)    LABS:    02-    135  |  99  |  7   ----------------------------<  59<L>  4.7   |  21<L>  |  0.25<L>    Ca    8.0<L>      2019 10:25  Phos  2.5     02-  Mg     1.9     -              Pulm: unlabored breathing on RA  CV: radial pulse 2+, cap refil <2sec  Chest: VAC w/ good seal, no surrounding edema, appropriately tender to touch

## 2019-02-14 NOTE — PROGRESS NOTE ADULT - ASSESSMENT
68yoF with poor medical care and no known PMH presents with multiple months of growing, fungating, malodorous right breast mass, fall and dizziness. Likely metastatic breast cancer. Found to be severely anemic on lab work, now more stable. MRI head 2/5 demonstrates many metastatic lesions within the brain. 2/11 pt s/p palliative mastectomy. Patient had GTC sz 2/13 in AM most likely from known brain mets & non-adherence to Keppra.    Plan:   Medical management as per primary team  c/w Keppra 500 BID (patient known to have declined in the past, but may not have capacity to do so now, consider psych consult if patient refuses medication)

## 2019-02-14 NOTE — PROGRESS NOTE ADULT - PROBLEM SELECTOR PLAN 4
- secondary to metastatic breast cancer  - MRI head with >15 metastatic lesions within the brain. Keppra 500mg BID & Decadron 4mg q6h. Discussed w/ neurosurg if ok for pt to be on DVT PPx - given lesions don't appear hemorrhagic, okay to continue Lovenox.  - will need whole brain radiation per radiation oncology. Can be revisited as outpt. - secondary to metastatic breast cancer  - MRI head with >15 metastatic lesions within the brain. Keppra 500 bid po.  - will need whole brain radiation per radiation oncology. Can be revisited as outpt.

## 2019-02-14 NOTE — PROGRESS NOTE ADULT - ASSESSMENT
68yoF with poor medical care and no known PMH presents with multiple months of growing, fungating, malodorous right breast mass, fall and dizziness. Likely metastatic breast cancer. Found to be severely anemic on lab work, now more stable. MRI head 2/5 demonstrates many metastatic lesions within the brain. 2/11 pt s/p palliative mastectomy. Was recovering well, however pt had GTC sz 2/13 in AM most likely from known brain mets & non-adherence to Keppra.

## 2019-02-14 NOTE — PROGRESS NOTE ADULT - SUBJECTIVE AND OBJECTIVE BOX
***************************************************************  Micha Bear MD Pgy-1  Contact/Pager [phone] / [phone]  ***************************************************************    TOMEKA PEREZ  68y  MRN: [id number]      Patient is a 68y old  Female who presents with a chief complaint of Symptomatic anemia, Rt Fungating Breast mass , (14 Feb 2019 12:08)      Subjective/ON Events:      MEDICATIONS  (STANDING):  amLODIPine   Tablet 5 milliGRAM(s) Oral daily  enoxaparin Injectable 40 milliGRAM(s) SubCutaneous daily  lactated ringers. 1000 milliLiter(s) (75 mL/Hr) IV Continuous <Continuous>  lactobacillus acidophilus 1 Tablet(s) Oral every 12 hours  levETIRAcetam 500 milliGRAM(s) Oral two times a day  senna 2 Tablet(s) Oral at bedtime    MEDICATIONS  (PRN):  acetaminophen   Tablet .. 650 milliGRAM(s) Oral every 6 hours PRN Mild Pain (1 - 3)  oxyCODONE    IR 2.5 milliGRAM(s) Oral every 6 hours PRN Moderate and Severe Pain (4 - 10)  polyethylene glycol 3350 17 Gram(s) Oral daily PRN Constipation        Objective:    Vitals: Vital Signs Last 24 Hrs  T(C): 36.9 (02-14-19 @ 09:00), Max: 37.3 (02-14-19 @ 00:00)  T(F): 98.5 (02-14-19 @ 09:00), Max: 99.1 (02-14-19 @ 00:00)  HR: 101 (02-14-19 @ 09:00) (100 - 105)  BP: 140/85 (02-14-19 @ 09:00) (118/84 - 146/89)  RR: 20 (02-14-19 @ 09:00) (18 - 20)  SpO2: 99% (02-14-19 @ 09:00) (92% - 100%)                I&O's Summary    13 Feb 2019 07:01  -  14 Feb 2019 07:00  --------------------------------------------------------  IN: 1000 mL / OUT: 750 mL / NET: 250 mL        PHYSICAL EXAM:  GENERAL: lethargic  HEAD:  Atraumatic, Normocephalic  EYES: EOMI, PERRLA, conjunctiva and sclera clear  CHEST/LUNG: Clear to auscultation bilaterally; No rales, rhonchi, wheezing, or rubs  HEART: mildly tachycardic, regular rhythm; No murmurs, rubs, or gallops  ABDOMEN: Soft, Nontender, Nondistended; Bowel sounds present  SKIN: R mastectomy incision w/ wound vac in place. No rashes or lesions  NERVOUS SYSTEM:  opens eyes to voice but not following commands                                            LABS:  02-13    135  |  99  |  7   ----------------------------<  59<L>  4.7   |  21<L>  |  0.25<L>  02-13    129<L>  |  96<L>  |  7   ----------------------------<  53<L>  5.8<H>   |  22  |  0.21<L>  02-12    140  |  101  |  13  ----------------------------<  88  3.9   |  26  |  0.35<L>    Ca    8.0<L>      13 Feb 2019 10:25  Ca    7.9<L>      13 Feb 2019 06:40  Ca    8.3<L>      12 Feb 2019 06:15  Phos  2.5     02-13  Mg     1.9     02-13    TPro  5.4<L>  /  Alb  2.0<L>  /  TBili  < 0.2<L>  /  DBili  x   /  AST  53<H>  /  ALT  8   /  AlkPhos  313<H>  02-12                          7.8    15.87 )-----------( 298      ( 12 Feb 2019 06:15 )             27.1 ***************************************************************  Micha Bear MD Pgy-1  Contact/Pager [phone] / [phone]  ***************************************************************    TOMEKA PEREZ  68y  MRN: [id number]      Patient is a 68y old  Female who presents with a chief complaint of Symptomatic anemia, Rt Fungating Breast mass , (14 Feb 2019 12:08)      Subjective/ON Events: No acute events overnight. VSS. Pt still lethargic but improved from yesterday. Opens eyes to voice and responds to questions, although slowly. Spoke w/ daughter, Sandy, over the phone ~2pm - addressed several issues:  - Discussed that Keppra is now PO instead of IV. Will continue to offer Keppra to pt, given pt w/ recent seizure. If pt demonstrates she is alert/oriented and refuses the Keppra, she has the right to refuse.  - Sandy is on board with discharge to rehab. Understands that if pt is not currently on hospice, she will have to be readmitted to a hospital shall she have a seizure.      MEDICATIONS  (STANDING):  amLODIPine   Tablet 5 milliGRAM(s) Oral daily  enoxaparin Injectable 40 milliGRAM(s) SubCutaneous daily  lactated ringers. 1000 milliLiter(s) (75 mL/Hr) IV Continuous <Continuous>  lactobacillus acidophilus 1 Tablet(s) Oral every 12 hours  levETIRAcetam 500 milliGRAM(s) Oral two times a day  senna 2 Tablet(s) Oral at bedtime    MEDICATIONS  (PRN):  acetaminophen   Tablet .. 650 milliGRAM(s) Oral every 6 hours PRN Mild Pain (1 - 3)  oxyCODONE    IR 2.5 milliGRAM(s) Oral every 6 hours PRN Moderate and Severe Pain (4 - 10)  polyethylene glycol 3350 17 Gram(s) Oral daily PRN Constipation        Objective:    Vitals: Vital Signs Last 24 Hrs  T(C): 36.9 (02-14-19 @ 09:00), Max: 37.3 (02-14-19 @ 00:00)  T(F): 98.5 (02-14-19 @ 09:00), Max: 99.1 (02-14-19 @ 00:00)  HR: 101 (02-14-19 @ 09:00) (100 - 105)  BP: 140/85 (02-14-19 @ 09:00) (118/84 - 146/89)  RR: 20 (02-14-19 @ 09:00) (18 - 20)  SpO2: 99% (02-14-19 @ 09:00) (92% - 100%)                I&O's Summary    13 Feb 2019 07:01  -  14 Feb 2019 07:00  --------------------------------------------------------  IN: 1000 mL / OUT: 750 mL / NET: 250 mL        PHYSICAL EXAM:  GENERAL: lethargic  HEAD:  Atraumatic, Normocephalic  EYES: EOMI, PERRLA, conjunctiva and sclera clear  CHEST/LUNG: Clear to auscultation bilaterally; No rales, rhonchi, wheezing, or rubs  HEART: mildly tachycardic, regular rhythm; No murmurs, rubs, or gallops  ABDOMEN: Soft, Nontender, Nondistended; Bowel sounds present  SKIN: R mastectomy incision w/ wound vac in place. No rashes or lesions  NERVOUS SYSTEM:  opens eyes to voice but not following commands                                            LABS:  02-13    135  |  99  |  7   ----------------------------<  59<L>  4.7   |  21<L>  |  0.25<L>  02-13    129<L>  |  96<L>  |  7   ----------------------------<  53<L>  5.8<H>   |  22  |  0.21<L>  02-12    140  |  101  |  13  ----------------------------<  88  3.9   |  26  |  0.35<L>    Ca    8.0<L>      13 Feb 2019 10:25  Ca    7.9<L>      13 Feb 2019 06:40  Ca    8.3<L>      12 Feb 2019 06:15  Phos  2.5     02-13  Mg     1.9     02-13    TPro  5.4<L>  /  Alb  2.0<L>  /  TBili  < 0.2<L>  /  DBili  x   /  AST  53<H>  /  ALT  8   /  AlkPhos  313<H>  02-12                          7.8    15.87 )-----------( 298      ( 12 Feb 2019 06:15 )             27.1 ***************************************************************  Micha Bear MD Pgy-1  Contact/Pager [phone] / [phone]  ***************************************************************    TOMEKA PEREZ  68y  MRN: [id number]      Patient is a 68y old  Female who presents with a chief complaint of Symptomatic anemia, Rt Fungating Breast mass , (14 Feb 2019 12:08)      Subjective/ON Events: No acute events overnight. VSS. Pt still lethargic but improved from yesterday. Opens eyes to voice and responds to questions, although slowly. Spoke w/ daughter, Sandy, over the phone ~2pm - addressed several issues:  - Discussed that Keppra is now PO instead of IV. Will continue to offer Keppra to pt, given pt w/ recent seizure. If pt demonstrates she is alert/oriented and refuses the Keppra, she has the right to refuse.  - Sandy is on board with discharge to rehab. Understands that if pt is not currently on hospice, she will have to be readmitted to a hospital shall she have a seizure.      MEDICATIONS  (STANDING):  amLODIPine   Tablet 5 milliGRAM(s) Oral daily  enoxaparin Injectable 40 milliGRAM(s) SubCutaneous daily  lactated ringers. 1000 milliLiter(s) (75 mL/Hr) IV Continuous <Continuous>  lactobacillus acidophilus 1 Tablet(s) Oral every 12 hours  levETIRAcetam 500 milliGRAM(s) Oral two times a day  senna 2 Tablet(s) Oral at bedtime    MEDICATIONS  (PRN):  acetaminophen   Tablet .. 650 milliGRAM(s) Oral every 6 hours PRN Mild Pain (1 - 3)  oxyCODONE    IR 2.5 milliGRAM(s) Oral every 6 hours PRN Moderate and Severe Pain (4 - 10)  polyethylene glycol 3350 17 Gram(s) Oral daily PRN Constipation        Objective:    Vitals: Vital Signs Last 24 Hrs  T(C): 36.9 (02-14-19 @ 09:00), Max: 37.3 (02-14-19 @ 00:00)  T(F): 98.5 (02-14-19 @ 09:00), Max: 99.1 (02-14-19 @ 00:00)  HR: 101 (02-14-19 @ 09:00) (100 - 105)  BP: 140/85 (02-14-19 @ 09:00) (118/84 - 146/89)  RR: 20 (02-14-19 @ 09:00) (18 - 20)  SpO2: 99% (02-14-19 @ 09:00) (92% - 100%)                I&O's Summary    13 Feb 2019 07:01  -  14 Feb 2019 07:00  --------------------------------------------------------  IN: 1000 mL / OUT: 750 mL / NET: 250 mL        PHYSICAL EXAM:  GENERAL: lethargic  HEAD:  Atraumatic, Normocephalic  EYES: EOMI, PERRLA, conjunctiva and sclera clear  CHEST/LUNG: Clear to auscultation bilaterally; No rales, rhonchi, wheezing, or rubs  HEART: mildly tachycardic, regular rhythm; No murmurs, rubs, or gallops  ABDOMEN: Soft, Nontender, Nondistended; Bowel sounds present  SKIN: R mastectomy incision w/ wound vac in place. No rashes or lesions  NERVOUS SYSTEM:  opens eyes to voice but not following commands                                            LABS:    02-13    135  |  99  |  7   ----------------------------<  59<L>  4.7   |  21<L>  |  0.25<L>  02-13    129<L>  |  96<L>  |  7   ----------------------------<  53<L>  5.8<H>   |  22  |  0.21<L>  02-12    140  |  101  |  13  ----------------------------<  88  3.9   |  26  |  0.35<L>    Ca    8.0<L>      13 Feb 2019 10:25  Ca    7.9<L>      13 Feb 2019 06:40  Ca    8.3<L>      12 Feb 2019 06:15  Phos  2.5     02-13  Mg     1.9     02-13    TPro  5.4<L>  /  Alb  2.0<L>  /  TBili  < 0.2<L>  /  DBili  x   /  AST  53<H>  /  ALT  8   /  AlkPhos  313<H>  02-12                          7.8    15.87 )-----------( 298      ( 12 Feb 2019 06:15 )             27.1

## 2019-02-14 NOTE — PROGRESS NOTE ADULT - PROBLEM SELECTOR PLAN 1
- Patient wants daughter (Sandy) and co- (Adriano Galan) to be her HCP  - Full code. Has capacity. Patient is  in [place], has strong mariola that God will heal her  - Palliative care on board  - Had GOC conversation 2/5 (see GOC note). Pt still ambivalent about speaking with medical oncology. She does not want to receive chemotherapy.  - will continue to have ongoing GOC discussions w/ pt & HCPs. Pt and daughter have been considering hospice recently. Will re-discuss in light of recent seizure and will also re-visit code status.  - agrees to blood transfusions. - Patient wants daughter (Sandy) and co- (Adriano Galan) to be her HCP  - Full code. Has capacity. Patient is  in [place], has strong mariola that God will heal her  - Palliative care on board  - Had GOC conversation 2/5 (see GOC note). Pt still ambivalent about speaking with medical oncology. She does not want to receive chemotherapy.  - will continue to have ongoing GOC discussions w/ pt & HCPs. Pt DNR/DNI. Family currently not ready for hospice but will continue to think it over. Agreeable for discharge to rehab.  - agrees to blood transfusions.

## 2019-02-14 NOTE — PROGRESS NOTE ADULT - SUBJECTIVE AND OBJECTIVE BOX
SUBJECTIVE AND OBJECTIVE  INTERVAL HPI/OVERNIGHT EVENTS:    DNR on chart: Yes      Allergies    No Known Allergies    Intolerances    MEDICATIONS  (STANDING):  amLODIPine   Tablet 5 milliGRAM(s) Oral daily  enoxaparin Injectable 40 milliGRAM(s) SubCutaneous daily  lactated ringers. 1000 milliLiter(s) (75 mL/Hr) IV Continuous <Continuous>  lactobacillus acidophilus 1 Tablet(s) Oral every 12 hours  levETIRAcetam  IVPB 500 milliGRAM(s) IV Intermittent every 12 hours  senna 2 Tablet(s) Oral at bedtime    MEDICATIONS  (PRN):  acetaminophen   Tablet .. 650 milliGRAM(s) Oral every 6 hours PRN Mild Pain (1 - 3)  oxyCODONE    IR 2.5 milliGRAM(s) Oral every 6 hours PRN Moderate and Severe Pain (4 - 10)  polyethylene glycol 3350 17 Gram(s) Oral daily PRN Constipation      ITEMS UNCHECKED ARE NOT PRESENT    PRESENT SYMPTOMS: [ ]Unable to obtain due to poor mentation   Source if other than patient:  [ ]Family   [ ]Team     Pain (Impact on QOL):    Location:  Minimal acceptable level (0-10 scale):            Aggravating factors:  Quality:  Radiation:  Severity (0-10 scale):    Timing:    Dyspnea:                           [ ]Mild [ ]Moderate [ ]Severe  Anxiety:                             [ ]Mild [ ]Moderate [ ]Severe  Fatigue:                             [ ]Mild [ ]Moderate [ ]Severe  Nausea:                             [ ]Mild [ ]Moderate [ ]Severe  Loss of appetite:              [ ]Mild [ ]Moderate [ ]Severe  Constipation:                    [ ]Mild [ ]Moderate [ ]Severe    PAIN AD Score:	  [email or web address]/painad.pdf (Ctrl + left click to view)    Other Symptoms:  [ ]All other review of systems negative     Karnofsky Performance Score/Palliative Performance Status Version 2:         %    http://palliative.info/resource_material/[email or web address].pdf    PHYSICAL EXAM:  Vital Signs Last 24 Hrs  T(C): 36.9 (14 Feb 2019 09:00), Max: 37.3 (14 Feb 2019 00:00)  T(F): 98.5 (14 Feb 2019 09:00), Max: 99.1 (14 Feb 2019 00:00)  HR: 101 (14 Feb 2019 09:00) (100 - 105)  BP: 140/85 (14 Feb 2019 09:00) (118/84 - 146/89)  BP(mean): --  RR: 20 (14 Feb 2019 09:00) (18 - 20)  SpO2: 92% (14 Feb 2019 05:03) (92% - 100%) I&O's Summary    13 Feb 2019 07:01  -  14 Feb 2019 07:00  --------------------------------------------------------  IN: 1000 mL / OUT: 750 mL / NET: 250 mL        GENERAL:  [ ]Alert  [ ]Oriented x   [ ]Lethargic  [ ]Cachexia  [ ]Unarousable  [ ]Verbal  [ ]Non-Verbal    Behavioral:   [ ] Anxiety  [ ] Delirium [ ] Agitation [ ] Other    HEENT:  [ ]Normal   [ ]Dry mouth   [ ]ET Tube/Trach  [ ]Oral lesions    PULMONARY:   [ ]Clear [ ]Tachypnea  [ ]Audible excessive secretions   [ ]Rhonchi        [ ]Right [ ]Left [ ]Bilateral  [ ]Crackles        [ ]Right [ ]Left [ ]Bilateral  [ ]Wheezing     [ ]Right [ ]Left [ ]Bilateral    CARDIOVASCULAR:    [ ]Regular [ ]Irregular [ ]Tachy  [ ]Aly [ ]Murmur [ ]Other    GASTROINTESTINAL:  [ ]Soft  [ ]Distended   [ ]+BS  [ ]Non tender [ ]Tender  [ ]PEG [ ]OGT/ NGT   Last BM:    GENITOURINARY:  [ ]Normal [ ] Incontinent   [ ]Oliguria/Anuria   [ ]Waller    MUSCULOSKELETAL:   [ ]Normal   [ ]Weakness  [ ]Bed/Wheelchair bound [ ]Edema    NEUROLOGIC:   [ ]No focal deficits  [ ] Cognitive impairment  [ ] Dysphagia [ ]Dysarthria [ ] Paresis [ ]Other     SKIN:   [ ]Normal   [ ]Pressure ulcer(s)  [ ]Rash    CRITICAL CARE:  [ ] Shock Present  [ ] Septic [ ] Cardiogenic [ ] Neurologic [ ] Hypovolemic [ ] Vasopressors [ ] Inotropes   [ ] Respiratory failure present  [ ] Acute [ ] Chronic [ ] Hypoxic [ ] Hypercarbic [ ] Other  [ ] Other organ failure     LABS:  02-13    135  |  99  |  7   ----------------------------<  59<L>  4.7   |  21<L>  |  0.25<L>    Ca    8.0<L>      13 Feb 2019 10:25  Phos  2.5     02-13  Mg     1.9     02-13          RADIOLOGY & ADDITIONAL STUDIES:    Protein Calorie Malnutrition Present: [ ] yes [ ] no  [ ] PPSV2 < or = 30%  [ ] significant weight loss [ ] poor nutritional intake [ ] anasarca [ ] catabolic state Albumin, Serum: 2.0 g/dL (02-12-19 @ 06:15)  Artificial Nutrition [ ]     REFERRALS:   [ ]Chaplaincy  [ ] Hospice  [ ]Child Life  [ ]Social Work  [ ]Case management [ ]Holistic Therapy   Goals of Care Document: Goals of Care Conversation - Personal Advance Directive [BRYANNA Bear] (02-13-19 @ 16:15)      Anselmo Lee, PGY-3 Pager#[phone]  ***To Be Reviewed with Attending SUBJECTIVE/OBJECTIVE  INTERVAL HPI/OVERNIGHT EVENTS:  more awake this AM. feels more comfortable, denies pain or dyspnea. ongoing issues with keppra.    DNR on chart: Yes      Allergies  No Known Allergies  Intolerances    MEDICATIONS  (STANDING):  amLODIPine   Tablet 5 milliGRAM(s) Oral daily  enoxaparin Injectable 40 milliGRAM(s) SubCutaneous daily  lactated ringers. 1000 milliLiter(s) (75 mL/Hr) IV Continuous <Continuous>  lactobacillus acidophilus 1 Tablet(s) Oral every 12 hours  levETIRAcetam  IVPB 500 milliGRAM(s) IV Intermittent every 12 hours  senna 2 Tablet(s) Oral at bedtime    MEDICATIONS  (PRN):  acetaminophen   Tablet .. 650 milliGRAM(s) Oral every 6 hours PRN Mild Pain (1 - 3)  oxyCODONE    IR 2.5 milliGRAM(s) Oral every 6 hours PRN Moderate and Severe Pain (4 - 10)  polyethylene glycol 3350 17 Gram(s) Oral daily PRN Constipation      ITEMS UNCHECKED ARE NOT PRESENT    PRESENT SYMPTOMS: [ ]Unable to obtain due to poor mentation   Source if other than patient:  [ ]Family   [ ]Team     Pain (Impact on QOL): X  Location:  Minimal acceptable level (0-10 scale):            Aggravating factors:  Quality:  Radiation:  Severity (0-10 scale):    Timing:    Dyspnea:                           [ ]Mild [ ]Moderate [ ]Severe  Anxiety:                             [ ]Mild [ ]Moderate [ ]Severe  Fatigue:                             [ ]Mild [ ]Moderate [ ]Severe  Nausea:                             [ ]Mild [ ]Moderate [ ]Severe  Loss of appetite:              [ ]Mild [ ]Moderate [ ]Severe  Constipation:                    [X]Mild [ ]Moderate [ ]Severe    PAIN AD Score:	  <[email or web address]/painad.[email or web address]> (Ctrl + left click to view)    Other Symptoms:  [X]All other review of systems negative     Karnofsky Performance Score/Palliative Performance Status Version 2:        40%    <http://palliative.info/resource_material/[email or web address].pdf>    PHYSICAL EXAM:  Vital Signs Last 24 Hrs  T(C): 36.9 (14 Feb 2019 09:00), Max: 37.3 (14 Feb 2019 00:00)  T(F): 98.5 (14 Feb 2019 09:00), Max: 99.1 (14 Feb 2019 00:00)  HR: 101 (14 Feb 2019 09:00) (100 - 105)  BP: 140/85 (14 Feb 2019 09:00) (118/84 - 146/89)  BP(mean): --  RR: 20 (14 Feb 2019 09:00) (18 - 20)  SpO2: 92% (14 Feb 2019 05:03) (92% - 100%) I&O's Summary    13 Feb 2019 07:01  -  14 Feb 2019 07:00  --------------------------------------------------------  IN: 1000 mL / OUT: 750 mL / NET: 250 mL        GENERAL:  [ ]Alert  [ ]Oriented x   [x]Lethargic  [ ]Cachexia  [ ]Unarousable  [ ]Verbal  [ ]Non-Verbal    Behavioral:   [ ] Anxiety  [ ] Delirium [ ] Agitation [ ] Other    HEENT:  [ ]Normal   [ ]Dry mouth   [ ]ET Tube/Trach  [ ]Oral lesions    PULMONARY:   [x]Clear [ ]Tachypnea  [ ]Audible excessive secretions   [ ]Rhonchi        [ ]Right [ ]Left [ ]Bilateral  [ ]Crackles        [ ]Right [ ]Left [ ]Bilateral  [ ]Wheezing     [ ]Right [ ]Left [ ]Bilateral    CARDIOVASCULAR:    [x]Regular [ ]Irregular [ ]Tachy  [ ]Aly [ ]Murmur [ ]Other    GASTROINTESTINAL:  [x]Soft  [ ]Distended   [x]+BS  [x]Non tender [ ]Tender  [ ]PEG [ ]OGT/ NGT   Last BM:    GENITOURINARY:  [x]Normal [ ] Incontinent   [ ]Oliguria/Anuria   [ ]Waller    MUSCULOSKELETAL:   [x]Normal   [ ]Weakness  [ ]Bed/Wheelchair bound [ ]Edema    NEUROLOGIC:   [x]No focal deficits  [ ] Cognitive impairment  [ ] Dysphagia [ ]Dysarthria [ ] Paresis [ ]Other     SKIN:   [ ]Normal   [ ]Pressure ulcer(s)  [ ]Rash [x] R leg wound vac    CRITICAL CARE:  [ ] Shock Present  [ ] Septic [ ] Cardiogenic [ ] Neurologic [ ] Hypovolemic [ ] Vasopressors [ ] Inotropes   [ ] Respiratory failure present  [ ] Acute [ ] Chronic [ ] Hypoxic [ ] Hypercarbic [ ] Other  [ ] Other organ failure     LABS:  02-13    135  |  99  |  7   ----------------------------<  59<L>  4.7   |  21<L>  |  0.25<L>    Ca    8.0<L>      13 Feb 2019 10:25  Phos  2.5     02-13  Mg     1.9     02-13          RADIOLOGY & ADDITIONAL STUDIES:    Protein Calorie Malnutrition Present: [ ] yes [ ] no  [ ] PPSV2 < or = 30%  [ ] significant weight loss [x] poor nutritional intake [ ] anasarca [ ] catabolic state Albumin, Serum: 2.0 g/dL (02-12-19 @ 06:15)  Artificial Nutrition [ ]     REFERRALS:   [ ]Chaplaincy  [ ] Hospice  [ ]Child Life  [ ]Social Work  [ ]Case management [ ]Holistic Therapy   Goals of Care Document: Goals of Care Conversation - Personal Advance Directive [BRYANNA Bear] (02-13-19 @ 16:15)      Anselmo Lee, PGY-3 Pager#[phone]

## 2019-02-14 NOTE — PROGRESS NOTE ADULT - PROBLEM SELECTOR PLAN 6
Likely anemia of chronic disease given Iron Studies, ferritin, Vit B12, Folate, LDH, Haptoglobin, Retic count  -Hgb hovering around 7.5  -monitor H/H, keep active T&S Likely anemia of chronic disease  -Hgb hovering around 7.5  -monitor H/H, keep active T&S

## 2019-02-14 NOTE — PROGRESS NOTE ADULT - ASSESSMENT
68F s/p R radical mastectomy, 15x20 STSG from R thigh on 2/11/19    Neuro: Keppra for seizure ppx per neuro  Resp: IS  GI: Regular diet, bowel reg  MSK: WBAT, PT/OT  Heme: DVT PPX per primary team/neuro  If patient stays for >7 days: Wound vac change POD#7 and then MWF  If patient stays < 7 days: home wound vac change POD#5 and then MWF   ([id number])  Excellent care per primary team     [id number]

## 2019-02-14 NOTE — PROGRESS NOTE ADULT - ATTENDING COMMENTS
68 W p/w R fungating breast mass and constitutional sx. Imaging with brain mets, lung mets, LN mets and adrenal mets; suspect breast cancer as primary tumor. s/p palliative R total mastectomy on 2/11/2019. Patient and daughter agreeable to Keppra and realize she is at increased risk of recurrent seizure if she does not take her Keppra. Mental status improved this AM. She is awaiting placement in [facility].

## 2019-02-14 NOTE — PROGRESS NOTE ADULT - PROBLEM SELECTOR PLAN 4
Highly concerning for malignancy with potential spread of disease to lungs/LN/brain but no definitive tissue biopsy at this time.  -mastectomy will provide tissue diagnosis to potentially pursue DMT/RT  -not interested in DMT at this time

## 2019-02-14 NOTE — PROGRESS NOTE ADULT - PROBLEM SELECTOR PLAN 8
- stable in ~200s   - likely in the setting of metastatic breast ca to bone  - ggt mildly high   - monitor LFTs - stable in ~200s   - likely in the setting of metastatic breast ca to bone & liver  - ggt mildly high   - monitor LFTs

## 2019-02-14 NOTE — PROGRESS NOTE ADULT - PROBLEM SELECTOR PLAN 9
- ECHO, ECG, + Anemia,  -s/p 1u pRBC  -Per CTA chest, neg for PE though there is mass effect on RUL pulmonary artery. Currently breathing on RA.  -monitor respiratory status DVT PPx: lovenox 40mg QD   Diet: DASH diet  Dispo: PT recommending rehab upon discharge.

## 2019-02-14 NOTE — PROGRESS NOTE ADULT - PROBLEM SELECTOR PLAN 1
-c/wOxy IR 2.5mg PO back to PRN for Moderate/Severe Pain given the patient's excessive drowsiness w/ Keppra loading  -Wound Vac as per Wound Care/Surgery

## 2019-02-14 NOTE — PROGRESS NOTE ADULT - SUBJECTIVE AND OBJECTIVE BOX
PLASTIC SURGERY DAILY PROGRESS NOTE:     Subjective: Patient seen and examined this AM. Placed back on home keppra for likely seizure prompting code stroke yesterday. Mildly lethargic but responsive this AM.     Objective:    MEDICATIONS  (STANDING):  MEDICATIONS  (STANDING):  amLODIPine   Tablet 5 milliGRAM(s) Oral daily  enoxaparin Injectable 40 milliGRAM(s) SubCutaneous daily  lactated ringers. 1000 milliLiter(s) (75 mL/Hr) IV Continuous <Continuous>  lactobacillus acidophilus 1 Tablet(s) Oral every 12 hours  levETIRAcetam  IVPB 500 milliGRAM(s) IV Intermittent every 12 hours  senna 2 Tablet(s) Oral at bedtime    MEDICATIONS  (PRN):  acetaminophen   Tablet .. 650 milliGRAM(s) Oral every 6 hours PRN Mild Pain (1 - 3)  oxyCODONE    IR 2.5 milliGRAM(s) Oral every 6 hours PRN Moderate and Severe Pain (4 - 10)  polyethylene glycol 3350 17 Gram(s) Oral daily PRN Constipation        Vital Signs Last 24 Hrs  ICU Vital Signs Last 24 Hrs  T(C): 36.9 (2019 09:00), Max: 37.3 (2019 00:00)  T(F): 98.5 (2019 09:00), Max: 99.1 (2019 00:00)  HR: 101 (2019 09:00) (100 - 105)  BP: 140/85 (2019 09:00) (118/84 - 146/89)  BP(mean): --  ABP: --  ABP(mean): --  RR: 20 (2019 09:00) (18 - 20)  SpO2: 92% (2019 05:03) (92% - 100%)    I&O's Detail    2019 07:01  -  2019 07:00  --------------------------------------------------------  IN:    lactated ringers.: 1000 mL  Total IN: 1000 mL    OUT:    VAC (Vacuum Assisted Closure) System: 350 mL    Voided: 400 mL  Total OUT: 750 mL    Total NET: 250 mL    PE:  Gen: Mildly lethargic but responsive to verbal commands   Resp: nonlabored  R Breast: wound vac holding suction well, intact   RLE: dressing c/d/i, WWP    Daily     Daily Weight in k.6 (2019 05:15)    LABS:                                          7.8    15.87 )-----------( 298      ( 2019 06:15 )             27.1       140  |  101  |  13  ----------------------------<  88  3.9   |  26  |  0.35<L>    Ca    8.3<L>      2019 06:15  Phos  1.9       Mg     2.1         TPro  5.4<L>  /  Alb  2.0<L>  /  TBili  < 0.2<L>  /  DBili  x   /  AST  53<H>  /  ALT  8   /  AlkPhos  313<H>

## 2019-02-14 NOTE — PROGRESS NOTE ADULT - ATTENDING COMMENTS
Pt examined, chart reviewed and case presented on rounds. Agree with above assessment and plan.  Pt is somewhat cooperative for exam and knows she is in the hospital for a mastectomy.  Was informed that she had a seizure and that she was being treated with medications. No clear focality on limited neurological exam.  Continue with supportive medical management.

## 2019-02-14 NOTE — PROGRESS NOTE ADULT - ASSESSMENT
68 year old female with R fungating breast mass and metastatic disease to lung, brain, and bone s/p resection on 2/11/19.     PLAN:  -  GOC discussion   - VAC per PRS  - Care per primary   - Please call Surgery with any further questions     Surgical Oncology / D team surgery  Pager [phone]

## 2019-02-15 DIAGNOSIS — D63.8 ANEMIA IN OTHER CHRONIC DISEASES CLASSIFIED ELSEWHERE: ICD-10-CM

## 2019-02-15 LAB
ANION GAP SERPL CALC-SCNC: 11 MMO/L — SIGNIFICANT CHANGE UP (ref 7–14)
BLD GP AB SCN SERPL QL: NEGATIVE — SIGNIFICANT CHANGE UP
BUN SERPL-MCNC: 5 MG/DL — LOW (ref 7–23)
CALCIUM SERPL-MCNC: 7.9 MG/DL — LOW (ref 8.4–10.5)
CHLORIDE SERPL-SCNC: 102 MMOL/L — SIGNIFICANT CHANGE UP (ref 98–107)
CO2 SERPL-SCNC: 28 MMOL/L — SIGNIFICANT CHANGE UP (ref 22–31)
CREAT SERPL-MCNC: 0.35 MG/DL — LOW (ref 0.5–1.3)
GLUCOSE SERPL-MCNC: 61 MG/DL — LOW (ref 70–99)
HCT VFR BLD CALC: 20.3 % — CRITICAL LOW (ref 34.5–45)
HGB BLD-MCNC: 6.2 G/DL — CRITICAL LOW (ref 11.5–15.5)
MAGNESIUM SERPL-MCNC: 1.8 MG/DL — SIGNIFICANT CHANGE UP (ref 1.6–2.6)
MCHC RBC-ENTMCNC: 28.3 PG — SIGNIFICANT CHANGE UP (ref 27–34)
MCHC RBC-ENTMCNC: 30.5 % — LOW (ref 32–36)
MCV RBC AUTO: 92.7 FL — SIGNIFICANT CHANGE UP (ref 80–100)
NRBC # FLD: 0.03 K/UL — LOW (ref 25–125)
PHOSPHATE SERPL-MCNC: 2 MG/DL — LOW (ref 2.5–4.5)
PLATELET # BLD AUTO: 288 K/UL — SIGNIFICANT CHANGE UP (ref 150–400)
PMV BLD: 11.2 FL — SIGNIFICANT CHANGE UP (ref 7–13)
POTASSIUM SERPL-MCNC: 3.1 MMOL/L — LOW (ref 3.5–5.3)
POTASSIUM SERPL-SCNC: 3.1 MMOL/L — LOW (ref 3.5–5.3)
RBC # BLD: 2.19 M/UL — LOW (ref 3.8–5.2)
RBC # FLD: 20.5 % — HIGH (ref 10.3–14.5)
RH IG SCN BLD-IMP: POSITIVE — SIGNIFICANT CHANGE UP
SODIUM SERPL-SCNC: 141 MMOL/L — SIGNIFICANT CHANGE UP (ref 135–145)
WBC # BLD: 14.74 K/UL — HIGH (ref 3.8–10.5)
WBC # FLD AUTO: 14.74 K/UL — HIGH (ref 3.8–10.5)

## 2019-02-15 PROCEDURE — 99233 SBSQ HOSP IP/OBS HIGH 50: CPT | Mod: GC

## 2019-02-15 RX ORDER — SODIUM,POTASSIUM PHOSPHATES 278-250MG
1 POWDER IN PACKET (EA) ORAL DAILY
Qty: 0 | Refills: 0 | Status: DISCONTINUED | OUTPATIENT
Start: 2019-02-15 | End: 2019-02-16

## 2019-02-15 RX ORDER — SENNA PLUS 8.6 MG/1
2 TABLET ORAL
Qty: 0 | Refills: 0 | COMMUNITY
Start: 2019-02-15

## 2019-02-15 RX ORDER — POTASSIUM CHLORIDE 20 MEQ
40 PACKET (EA) ORAL ONCE
Qty: 0 | Refills: 0 | Status: COMPLETED | OUTPATIENT
Start: 2019-02-15 | End: 2019-02-15

## 2019-02-15 RX ORDER — POLYETHYLENE GLYCOL 3350 17 G/17G
17 POWDER, FOR SOLUTION ORAL
Qty: 0 | Refills: 0 | COMMUNITY
Start: 2019-02-15

## 2019-02-15 RX ORDER — POTASSIUM CHLORIDE 20 MEQ
40 PACKET (EA) ORAL ONCE
Qty: 0 | Refills: 0 | Status: DISCONTINUED | OUTPATIENT
Start: 2019-02-15 | End: 2019-02-15

## 2019-02-15 RX ORDER — SODIUM,POTASSIUM PHOSPHATES 278-250MG
1 POWDER IN PACKET (EA) ORAL
Qty: 0 | Refills: 0 | COMMUNITY
Start: 2019-02-15

## 2019-02-15 RX ADMIN — Medication 1 TABLET(S): at 17:04

## 2019-02-15 RX ADMIN — ENOXAPARIN SODIUM 40 MILLIGRAM(S): 100 INJECTION SUBCUTANEOUS at 21:45

## 2019-02-15 RX ADMIN — LEVETIRACETAM 500 MILLIGRAM(S): 250 TABLET, FILM COATED ORAL at 17:04

## 2019-02-15 RX ADMIN — Medication 40 MILLIEQUIVALENT(S): at 13:56

## 2019-02-15 RX ADMIN — SENNA PLUS 2 TABLET(S): 8.6 TABLET ORAL at 21:46

## 2019-02-15 RX ADMIN — LEVETIRACETAM 500 MILLIGRAM(S): 250 TABLET, FILM COATED ORAL at 06:35

## 2019-02-15 RX ADMIN — Medication 1 TABLET(S): at 06:35

## 2019-02-15 RX ADMIN — AMLODIPINE BESYLATE 5 MILLIGRAM(S): 2.5 TABLET ORAL at 06:35

## 2019-02-15 RX ADMIN — SODIUM CHLORIDE 75 MILLILITER(S): 9 INJECTION, SOLUTION INTRAVENOUS at 06:35

## 2019-02-15 RX ADMIN — Medication 1 PACKET(S): at 13:56

## 2019-02-15 NOTE — PROGRESS NOTE ADULT - ASSESSMENT
68F s/p R radical mastectomy, 15x20 STSG from R thigh on 2/11/19    Neuro: Keppra for seizure ppx per neuro  Resp: IS  GI: Regular diet, bowel reg  MSK: WBAT, PT/OT  Heme: DVT PPX per primary team/neuro  If patient stays for >7 days: Wound vac change POD#7 and then MWF  If patient stays < 7 days: home wound vac change POD#5 and then MWF   ([id number])  Excellent care per primary team     [id number]

## 2019-02-15 NOTE — PROGRESS NOTE ADULT - ATTENDING COMMENTS
68 W p/w R fungating breast mass and constitutional sx. Imaging with brain mets, lung mets, LN mets and adrenal mets; suspect breast cancer as primary tumor. s/p palliative R total mastectomy on 2/11/2019. Patient and daughter agreeable to Keppra and realize she is at increased risk of recurrent seizure if she does not take her Keppra.    Hgb 6.2 today, similar to hgb at time of presentation. No e/o rectal bleeding. Likely AOCI in this patient with metastatic cancer. Transfusing 2 units pRBCs.

## 2019-02-15 NOTE — PROGRESS NOTE ADULT - PROBLEM SELECTOR PLAN 2
- GTC x 1 2/13 that resolved w/ administration of Ativan 2mg IV  - Keppra 500mg PO bid  - CT head re-demonstrates known metastases, no significant edema noted. No acute intracranial hemorrhage.   - discussed importance of Keppra compliance w/ daughter. Currently ok with PO Keppra, will continue to offer to pt (which pt has right to decline as long as she is alert/oriented).

## 2019-02-15 NOTE — PROGRESS NOTE ADULT - PROBLEM SELECTOR PLAN 5
- WBC stably elevated without localizing sign of infexn; afebrile  - blood cx negative   - ID thinks likely secondary to cancer and not infection - secondary to metastatic breast cancer  - MRI head with >15 metastatic lesions within the brain. Keppra 500 bid po.  - will need whole brain radiation per radiation oncology. Can be revisited as outpt.

## 2019-02-15 NOTE — PROGRESS NOTE ADULT - PROBLEM SELECTOR PLAN 3
- Presents with R fungating breast mass, + Malodorous, + Drainage found to have likely mets to brain and lungs  - s/p palliative total mastectomy of R breast on 2/11; wound vac in place  - mets to brain, adrenals, lung, .  -Per CTA chest, neg for PE though there is mass effect on RUL pulmonary artery. Currently breathing on RA.  - oxycodone 2.5mg q6h prn for pain  - f/u surg recs - from long standing metastatic breast cancer  - originally presented w/ symptomatic anemia Hgb < 7  - Hgb hovers around 7.5 but 6.2 this AM. No signs of overt bleeding. No dark/tarry stools, no BRBPR. Will transfuse 1u pRBC & f/u post transfusion CBC. - from long standing metastatic breast cancer with resulting AOCI  - originally presented w/ symptomatic anemia Hgb < 7  - Hgb hovers around 7.5 but 6.2 this AM. No signs of overt bleeding. No dark/tarry stools, no BRBPR. Have not checked CBC in several days (likely did not drop acutely). Will transfuse 2u pRBC & f/u post transfusion CBC.  - patient/daughter do not want invasive measures

## 2019-02-15 NOTE — PROGRESS NOTE ADULT - PROBLEM SELECTOR PLAN 8
- stable in ~200s   - likely in the setting of metastatic breast ca to bone & liver  - ggt mildly high   - monitor LFTs - stable in ~200s   - likely in the setting of metastatic breast ca to bone  - ggt mildly high   - monitor LFTs

## 2019-02-15 NOTE — PROGRESS NOTE ADULT - PROBLEM SELECTOR PLAN 4
- secondary to metastatic breast cancer  - MRI head with >15 metastatic lesions within the brain. Keppra 500 bid po.  - will need whole brain radiation per radiation oncology. Can be revisited as outpt. - Presented with R fungating breast mass. Has mets to brain, lungs, adrenals, bone.  - s/p palliative total mastectomy of R breast on 2/11; wound vac in place  -Per CTA chest, neg for PE though there is mass effect on RUL pulmonary artery. Currently breathing on RA.  - oxycodone 2.5mg q6h prn for pain  - f/u surg recs

## 2019-02-15 NOTE — PROGRESS NOTE ADULT - PROBLEM SELECTOR PLAN 6
Likely anemia of chronic disease  -Hgb hovering around 7.5  -monitor H/H - WBC stably elevated without localizing sign of infexn; afebrile  - blood cx negative   - ID thinks likely secondary to cancer and not infection

## 2019-02-15 NOTE — PROGRESS NOTE ADULT - SUBJECTIVE AND OBJECTIVE BOX
PLASTIC SURGERY DAILY PROGRESS NOTE:     Subjective: Patient seen and examined this AM. No acute events overnight.     Objective:    MEDICATIONS  (STANDING):  MEDICATIONS  (STANDING):  amLODIPine   Tablet 5 milliGRAM(s) Oral daily  enoxaparin Injectable 40 milliGRAM(s) SubCutaneous daily  lactated ringers. 1000 milliLiter(s) (75 mL/Hr) IV Continuous <Continuous>  lactobacillus acidophilus 1 Tablet(s) Oral every 12 hours  levETIRAcetam  IVPB 500 milliGRAM(s) IV Intermittent every 12 hours  senna 2 Tablet(s) Oral at bedtime    MEDICATIONS  (PRN):  acetaminophen   Tablet .. 650 milliGRAM(s) Oral every 6 hours PRN Mild Pain (1 - 3)  oxyCODONE    IR 2.5 milliGRAM(s) Oral every 6 hours PRN Moderate and Severe Pain (4 - 10)  polyethylene glycol 3350 17 Gram(s) Oral daily PRN Constipation        Vital Signs Last 24 Hrs  Vital Signs Last 24 Hrs  T(C): 37.1 (15 Feb 2019 05:13), Max: 37.2 (2019 17:28)  T(F): 98.7 (15 Feb 2019 05:13), Max: 98.9 (2019 17:28)  HR: 110 (15 Feb 2019 05:13) (97 - 112)  BP: 137/80 (15 Feb 2019 05:13) (118/77 - 149/84)  BP(mean): --  RR: 20 (15 Feb 2019 05:13) (18 - 20)  SpO2: 97% (15 Feb 2019 05:13) (93% - 99%)      I&O's Detail    2019 07:01  -  15 Feb 2019 07:00  --------------------------------------------------------  IN:    Oral Fluid: 150 mL  Total IN: 150 mL    OUT:    VAC (Vacuum Assisted Closure) System: 350 mL  Total OUT: 350 mL    Total NET: -200 mL        PE:  Gen: Mildly lethargic but responsive to verbal commands   Resp: nonlabored  R Breast: wound vac holding suction well, intact   RLE: dressing c/d/i, WWP    Daily     Daily Weight in k.6 (2019 05:15)    LABS:                                          7.8    15.87 )-----------( 298      ( 2019 06:15 )             27.1   0212    140  |  101  |  13  ----------------------------<  88  3.9   |  26  |  0.35<L>    Ca    8.3<L>      2019 06:15  Phos  1.9     12  Mg     2.1         TPro  5.4<L>  /  Alb  2.0<L>  /  TBili  < 0.2<L>  /  DBili  x   /  AST  53<H>  /  ALT  8   /  AlkPhos  313<H>

## 2019-02-15 NOTE — PROGRESS NOTE ADULT - PROBLEM SELECTOR PLAN 1
- Patient wants daughter (Sandy) and co- (Adriano Galan) to be her HCP  - Full code. Has capacity. Patient is  in [employer], has strong mariola that God will heal her  - Palliative care on board  - Had GOC conversation 2/5 (see GOC note). Pt still ambivalent about speaking with medical oncology. She does not want to receive chemotherapy.  - will continue to have ongoing GOC discussions w/ pt & HCPs. Pt DNR/DNI. Family currently not ready for hospice but will continue to think it over. Agreeable for discharge to rehab.  - agrees to blood transfusions.

## 2019-02-15 NOTE — PROGRESS NOTE ADULT - SUBJECTIVE AND OBJECTIVE BOX
***************************************************************  Micha Bear MD Pgy-1  Contact/Pager [phone] / [phone]  ***************************************************************    TOMEKA PEREZ  68y  MRN: [id number]      Patient is a 68y old  Female who presents with a chief complaint of Symptomatic anemia, Rt Fungating Breast mass , (14 Feb 2019 13:44)      Subjective/ON Events: No acute events overnight. Overall more interactive today but still lethargic. Wound vac draining serosanguinous fluid. Pain well controlled.      MEDICATIONS  (STANDING):  amLODIPine   Tablet 5 milliGRAM(s) Oral daily  enoxaparin Injectable 40 milliGRAM(s) SubCutaneous daily  lactated ringers. 1000 milliLiter(s) (75 mL/Hr) IV Continuous <Continuous>  lactobacillus acidophilus 1 Tablet(s) Oral every 12 hours  levETIRAcetam  Solution 500 milliGRAM(s) Oral two times a day  senna 2 Tablet(s) Oral at bedtime    MEDICATIONS  (PRN):  acetaminophen   Tablet .. 650 milliGRAM(s) Oral every 6 hours PRN Mild Pain (1 - 3)  oxyCODONE    IR 2.5 milliGRAM(s) Oral every 6 hours PRN Moderate and Severe Pain (4 - 10)  polyethylene glycol 3350 17 Gram(s) Oral daily PRN Constipation        Objective:    Vitals: Vital Signs Last 24 Hrs  T(C): 37.1 (02-15-19 @ 05:13), Max: 37.2 (02-14-19 @ 17:28)  T(F): 98.7 (02-15-19 @ 05:13), Max: 98.9 (02-14-19 @ 17:28)  HR: 110 (02-15-19 @ 05:13) (97 - 112)  BP: 137/80 (02-15-19 @ 05:13) (118/77 - 149/84)  RR: 20 (02-15-19 @ 05:13) (18 - 20)  SpO2: 97% (02-15-19 @ 05:13) (93% - 99%)                I&O's Summary    14 Feb 2019 07:01  -  15 Feb 2019 07:00  --------------------------------------------------------  IN: 150 mL / OUT: 350 mL / NET: -200 mL        PHYSICAL EXAM:  GENERAL: lethargic  HEAD:  Atraumatic, Normocephalic  EYES: EOMI, PERRLA, conjunctiva and sclera clear  CHEST/LUNG: Clear to auscultation bilaterally; No rales, rhonchi, wheezing, or rubs  HEART: mildly tachycardic, regular rhythm; No murmurs, rubs, or gallops  ABDOMEN: Soft, Nontender, Nondistended; Bowel sounds present  SKIN: R mastectomy incision w/ wound vac in place. No rashes or lesions  NERVOUS SYSTEM:  opens eyes to voice but not following commands                                           LABS:  02-13    135  |  99  |  7   ----------------------------<  59<L>  4.7   |  21<L>  |  0.25<L>  02-13    129<L>  |  96<L>  |  7   ----------------------------<  53<L>  5.8<H>   |  22  |  0.21<L>    Ca    8.0<L>      13 Feb 2019 10:25  Ca    7.9<L>      13 Feb 2019 06:40  Phos  2.5     02-13  Mg     1.9     02-13 ***************************************************************  Micha Bear MD Pgy-1  Contact/Pager [phone] / [phone]  ***************************************************************    TOMEKA PEREZ  68y  MRN: [id number]      Patient is a 68y old  Female who presents with a chief complaint of Symptomatic anemia, Rt Fungating Breast mass , (14 Feb 2019 13:44)      Subjective/ON Events: No acute events overnight. Overall more interactive today but still lethargic. Wound vac draining serosanguinous fluid. Pain well controlled. However, mildly tachycardic in AM and found to have Hgb 6.2 on morning labs. No signs of overt bleeding. No dark, tarry stools or BRBPR.      MEDICATIONS  (STANDING):  amLODIPine   Tablet 5 milliGRAM(s) Oral daily  enoxaparin Injectable 40 milliGRAM(s) SubCutaneous daily  lactated ringers. 1000 milliLiter(s) (75 mL/Hr) IV Continuous <Continuous>  lactobacillus acidophilus 1 Tablet(s) Oral every 12 hours  levETIRAcetam  Solution 500 milliGRAM(s) Oral two times a day  senna 2 Tablet(s) Oral at bedtime    MEDICATIONS  (PRN):  acetaminophen   Tablet .. 650 milliGRAM(s) Oral every 6 hours PRN Mild Pain (1 - 3)  oxyCODONE    IR 2.5 milliGRAM(s) Oral every 6 hours PRN Moderate and Severe Pain (4 - 10)  polyethylene glycol 3350 17 Gram(s) Oral daily PRN Constipation        Objective:    Vitals: Vital Signs Last 24 Hrs  T(C): 37.1 (02-15-19 @ 05:13), Max: 37.2 (02-14-19 @ 17:28)  T(F): 98.7 (02-15-19 @ 05:13), Max: 98.9 (02-14-19 @ 17:28)  HR: 110 (02-15-19 @ 05:13) (97 - 112)  BP: 137/80 (02-15-19 @ 05:13) (118/77 - 149/84)  RR: 20 (02-15-19 @ 05:13) (18 - 20)  SpO2: 97% (02-15-19 @ 05:13) (93% - 99%)                I&O's Summary    14 Feb 2019 07:01  -  15 Feb 2019 07:00  --------------------------------------------------------  IN: 150 mL / OUT: 350 mL / NET: -200 mL        PHYSICAL EXAM:  GENERAL: lethargic  HEAD:  Atraumatic, Normocephalic  EYES: EOMI, PERRLA, conjunctiva and sclera clear  CHEST/LUNG: Clear to auscultation bilaterally; No rales, rhonchi, wheezing, or rubs  HEART: mildly tachycardic, regular rhythm; No murmurs, rubs, or gallops  ABDOMEN: Soft, Nontender, Nondistended; Bowel sounds present  SKIN: R mastectomy incision w/ wound vac in place. No rashes or lesions  NERVOUS SYSTEM:  opens eyes to voice, occasionally follows commands                                           LABS:                          6.2    14.74 )-----------( 288      ( 15 Feb 2019 06:10 )             20.3       02-15    141  |  102  |  5<L>  ----------------------------<  61<L>  3.1<L>   |  28  |  0.35<L>    Ca    7.9<L>      15 Feb 2019 06:10  Phos  2.0     02-15  Mg     1.8     02-15 ***************************************************************  Micha Bear MD Pgy-1  Contact/Pager [phone] / [phone]  ***************************************************************    TOMEKA PEREZ  68y  MRN: [id number]      Patient is a 68y old  Female who presents with a chief complaint of Symptomatic anemia, Rt Fungating Breast mass , (14 Feb 2019 13:44)      Subjective/ON Events: No acute events overnight. Overall more interactive today but still lethargic. Wound vac draining serosanguinous fluid. Pain well controlled. However, mildly tachycardic in AM and found to have Hgb 6.2 on morning labs. No signs of overt bleeding. No dark, tarry stools or BRBPR. No f/c.      MEDICATIONS  (STANDING):  amLODIPine   Tablet 5 milliGRAM(s) Oral daily  enoxaparin Injectable 40 milliGRAM(s) SubCutaneous daily  lactated ringers. 1000 milliLiter(s) (75 mL/Hr) IV Continuous <Continuous>  lactobacillus acidophilus 1 Tablet(s) Oral every 12 hours  levETIRAcetam  Solution 500 milliGRAM(s) Oral two times a day  senna 2 Tablet(s) Oral at bedtime    MEDICATIONS  (PRN):  acetaminophen   Tablet .. 650 milliGRAM(s) Oral every 6 hours PRN Mild Pain (1 - 3)  oxyCODONE    IR 2.5 milliGRAM(s) Oral every 6 hours PRN Moderate and Severe Pain (4 - 10)  polyethylene glycol 3350 17 Gram(s) Oral daily PRN Constipation        Objective:    Vitals: Vital Signs Last 24 Hrs  T(C): 37.1 (02-15-19 @ 05:13), Max: 37.2 (02-14-19 @ 17:28)  T(F): 98.7 (02-15-19 @ 05:13), Max: 98.9 (02-14-19 @ 17:28)  HR: 110 (02-15-19 @ 05:13) (97 - 112)  BP: 137/80 (02-15-19 @ 05:13) (118/77 - 149/84)  RR: 20 (02-15-19 @ 05:13) (18 - 20)  SpO2: 97% (02-15-19 @ 05:13) (93% - 99%)                I&O's Summary    14 Feb 2019 07:01  -  15 Feb 2019 07:00  --------------------------------------------------------  IN: 150 mL / OUT: 350 mL / NET: -200 mL        PHYSICAL EXAM:  GENERAL: lethargic  HEAD:  Atraumatic, Normocephalic  EYES: EOMI, PERRLA, conjunctiva and sclera clear  CHEST/LUNG: Clear to auscultation bilaterally; No rales, rhonchi, wheezing, or rubs  HEART: mildly tachycardic, regular rhythm; No murmurs, rubs, or gallops  ABDOMEN: Soft, Nontender, Nondistended; Bowel sounds present  SKIN: R mastectomy incision w/ wound vac in place. No rashes or lesions  NERVOUS SYSTEM:  opens eyes to voice, occasionally follows commands                                           LABS:                          6.2    14.74 )-----------( 288      ( 15 Feb 2019 06:10 )             20.3       02-15    141  |  102  |  5<L>  ----------------------------<  61<L>  3.1<L>   |  28  |  0.35<L>    Ca    7.9<L>      15 Feb 2019 06:10  Phos  2.0     02-15  Mg     1.8     02-15

## 2019-02-16 ENCOUNTER — INBOUND DOCUMENT (OUTPATIENT)
Age: 69
End: 2019-02-16

## 2019-02-16 VITALS
TEMPERATURE: 99 F | DIASTOLIC BLOOD PRESSURE: 86 MMHG | RESPIRATION RATE: 18 BRPM | HEART RATE: 109 BPM | OXYGEN SATURATION: 97 % | SYSTOLIC BLOOD PRESSURE: 147 MMHG

## 2019-02-16 LAB
ANION GAP SERPL CALC-SCNC: 13 MMO/L — SIGNIFICANT CHANGE UP (ref 7–14)
BUN SERPL-MCNC: 5 MG/DL — LOW (ref 7–23)
CALCIUM SERPL-MCNC: 8.1 MG/DL — LOW (ref 8.4–10.5)
CHLORIDE SERPL-SCNC: 98 MMOL/L — SIGNIFICANT CHANGE UP (ref 98–107)
CO2 SERPL-SCNC: 27 MMOL/L — SIGNIFICANT CHANGE UP (ref 22–31)
CREAT SERPL-MCNC: 0.27 MG/DL — LOW (ref 0.5–1.3)
GLUCOSE SERPL-MCNC: 81 MG/DL — SIGNIFICANT CHANGE UP (ref 70–99)
HCT VFR BLD CALC: 31.6 % — LOW (ref 34.5–45)
HCT VFR BLD CALC: 31.9 % — LOW (ref 34.5–45)
HGB BLD-MCNC: 10 G/DL — LOW (ref 11.5–15.5)
HGB BLD-MCNC: 10.2 G/DL — LOW (ref 11.5–15.5)
MAGNESIUM SERPL-MCNC: 1.5 MG/DL — LOW (ref 1.6–2.6)
MCHC RBC-ENTMCNC: 28 PG — SIGNIFICANT CHANGE UP (ref 27–34)
MCHC RBC-ENTMCNC: 28.1 PG — SIGNIFICANT CHANGE UP (ref 27–34)
MCHC RBC-ENTMCNC: 31.3 % — LOW (ref 32–36)
MCHC RBC-ENTMCNC: 32.3 % — SIGNIFICANT CHANGE UP (ref 32–36)
MCV RBC AUTO: 87.1 FL — SIGNIFICANT CHANGE UP (ref 80–100)
MCV RBC AUTO: 89.4 FL — SIGNIFICANT CHANGE UP (ref 80–100)
NRBC # FLD: 0.02 K/UL — LOW (ref 25–125)
NRBC # FLD: 0.03 K/UL — LOW (ref 25–125)
PHOSPHATE SERPL-MCNC: 2.2 MG/DL — LOW (ref 2.5–4.5)
PLATELET # BLD AUTO: 246 K/UL — SIGNIFICANT CHANGE UP (ref 150–400)
PLATELET # BLD AUTO: 264 K/UL — SIGNIFICANT CHANGE UP (ref 150–400)
PMV BLD: 10.6 FL — SIGNIFICANT CHANGE UP (ref 7–13)
PMV BLD: 10.8 FL — SIGNIFICANT CHANGE UP (ref 7–13)
POTASSIUM SERPL-MCNC: 3 MMOL/L — LOW (ref 3.5–5.3)
POTASSIUM SERPL-SCNC: 3 MMOL/L — LOW (ref 3.5–5.3)
RBC # BLD: 3.57 M/UL — LOW (ref 3.8–5.2)
RBC # BLD: 3.63 M/UL — LOW (ref 3.8–5.2)
RBC # FLD: 19.5 % — HIGH (ref 10.3–14.5)
RBC # FLD: 19.9 % — HIGH (ref 10.3–14.5)
SODIUM SERPL-SCNC: 138 MMOL/L — SIGNIFICANT CHANGE UP (ref 135–145)
WBC # BLD: 16.45 K/UL — HIGH (ref 3.8–10.5)
WBC # BLD: 16.58 K/UL — HIGH (ref 3.8–10.5)
WBC # FLD AUTO: 16.45 K/UL — HIGH (ref 3.8–10.5)
WBC # FLD AUTO: 16.58 K/UL — HIGH (ref 3.8–10.5)

## 2019-02-16 PROCEDURE — 99239 HOSP IP/OBS DSCHRG MGMT >30: CPT

## 2019-02-16 RX ORDER — POTASSIUM CHLORIDE 20 MEQ
40 PACKET (EA) ORAL ONCE
Qty: 0 | Refills: 0 | Status: COMPLETED | OUTPATIENT
Start: 2019-02-16 | End: 2019-02-16

## 2019-02-16 RX ORDER — LIDOCAINE HCL 20 MG/ML
30 VIAL (ML) INJECTION ONCE
Qty: 0 | Refills: 0 | Status: COMPLETED | OUTPATIENT
Start: 2019-02-16 | End: 2019-02-16

## 2019-02-16 RX ADMIN — Medication 1 TABLET(S): at 17:48

## 2019-02-16 RX ADMIN — LEVETIRACETAM 500 MILLIGRAM(S): 250 TABLET, FILM COATED ORAL at 17:48

## 2019-02-16 RX ADMIN — AMLODIPINE BESYLATE 5 MILLIGRAM(S): 2.5 TABLET ORAL at 05:49

## 2019-02-16 RX ADMIN — Medication 1 TABLET(S): at 05:49

## 2019-02-16 RX ADMIN — LEVETIRACETAM 500 MILLIGRAM(S): 250 TABLET, FILM COATED ORAL at 05:48

## 2019-02-16 RX ADMIN — SODIUM CHLORIDE 75 MILLILITER(S): 9 INJECTION, SOLUTION INTRAVENOUS at 11:27

## 2019-02-16 RX ADMIN — Medication 1 PACKET(S): at 11:26

## 2019-02-16 RX ADMIN — Medication 30 MILLILITER(S): at 09:16

## 2019-02-16 RX ADMIN — Medication 40 MILLIEQUIVALENT(S): at 11:26

## 2019-02-16 NOTE — PROGRESS NOTE ADULT - PROBLEM SELECTOR PROBLEM 7
Elevated blood pressure reading
Alkaline phosphatase elevation
Elevated blood pressure reading
SOB (shortness of breath)
SOB (shortness of breath)
Alkaline phosphatase elevation

## 2019-02-16 NOTE — PROGRESS NOTE ADULT - SUBJECTIVE AND OBJECTIVE BOX
PLASTIC SURGERY DAILY PROGRESS NOTE:     Subjective: Patient seen and examined this AM. Planning for dicharge to rehab today, RLE dresing changed, vac changed   Objective:    MEDICATIONS  (STANDING):  MEDICATIONS  (STANDING):  amLODIPine   Tablet 5 milliGRAM(s) Oral daily  enoxaparin Injectable 40 milliGRAM(s) SubCutaneous daily  lactated ringers. 1000 milliLiter(s) (75 mL/Hr) IV Continuous <Continuous>  lactobacillus acidophilus 1 Tablet(s) Oral every 12 hours  levETIRAcetam  IVPB 500 milliGRAM(s) IV Intermittent every 12 hours  senna 2 Tablet(s) Oral at bedtime    MEDICATIONS  (PRN):  acetaminophen   Tablet .. 650 milliGRAM(s) Oral every 6 hours PRN Mild Pain (1 - 3)  oxyCODONE    IR 2.5 milliGRAM(s) Oral every 6 hours PRN Moderate and Severe Pain (4 - 10)  polyethylene glycol 3350 17 Gram(s) Oral daily PRN Constipation        Vital Signs  Vital Signs Last 24 Hrs  T(C): 37 (2019 05:16), Max: 37.3 (15 Feb 2019 16:51)  T(F): 98.6 (2019 05:16), Max: 99.2 (15 Feb 2019 16:51)  HR: 105 (2019 05:16) (100 - 108)  BP: 154/87 (2019 05:16) (136/85 - 169/88)  BP(mean): --  RR: 18 (2019 05:16) (17 - 18)  SpO2: 97% (2019 05:16) (90% - 99%)    I&O's Detail    15 Feb 2019 07:01  -  2019 07:00  --------------------------------------------------------  IN:    Oral Fluid: 250 mL  Total IN: 250 mL    OUT:    VAC (Vacuum Assisted Closure) System: 350 mL  Total OUT: 350 mL    Total NET: -100 mL      PE:  Gen: Mildly lethargic but responsive to verbal commands   Resp: nonlabored  R Breast: wound vac changed, skin graft with good take, no collections no exudate    RLE: dressing changed, wound with oozing, yet to heal over    Daily     Daily Weight in k.6 (2019 05:15)    LABS:                                                        10.2   16.45 )-----------( 264      ( 2019 07:00 )             31.6     02-12    140  |  101  |  13  ----------------------------<  88  3.9   |  26  |  0.35<L>    Ca    8.3<L>      2019 06:15  Phos  1.9     12  Mg     2.1         TPro  5.4<L>  /  Alb  2.0<L>  /  TBili  < 0.2<L>  /  DBili  x   /  AST  53<H>  /  ALT  8   /  AlkPhos  313<H>

## 2019-02-16 NOTE — PROGRESS NOTE ADULT - ASSESSMENT
68yoF with poor medical care and no known PMH presents with multiple months of growing, fungating, malodorous right breast mass, fall and dizziness. Likely metastatic breast cancer. Found to be severely anemic on lab work, now more stable. MRI head 2/5 demonstrates many metastatic lesions within the brain. 2/11 pt s/p palliative mastectomy. Was recovering well, however pt had GTC sz 2/13 in AM most likely from known brain mets & non-adherence to Keppra. 68yoF with poor medical care and no known PMH presents with multiple months of growing, fungating, malodorous right breast mass, fall and dizziness. Likely metastatic breast cancer. Found to be severely anemic on lab work, now more stable. MRI head 2/5 demonstrates many metastatic lesions within the brain. 2/11 pt s/p palliative mastectomy. Was recovering well, however pt had GTC sz 2/13 in AM most likely from known brain mets & non-adherence to Keppra. No recurrence of seizure since then. Medically clear for discharge to [facility].

## 2019-02-16 NOTE — PROGRESS NOTE ADULT - PROBLEM SELECTOR PLAN 1
- Patient wants daughter (Sandy) and co- (Adriano Galan) to be her HCP  - Full code. Has capacity. Patient is  in [place], has strong mariola that God will heal her  - Palliative care on board  - Had GOC conversation 2/5 (see GOC note). Pt still ambivalent about speaking with medical oncology. She does not want to receive chemotherapy.  - Per GOC discussions, pt DNR/DNI. Family currently not ready for hospice but will continue to think it over. Agreeable for discharge to rehab today.  - agrees to blood transfusions. - Patient wants daughter (Sandy) and co- (Adriano Galan) to be her HCP  - Has capacity. Patient is  in [employer], has strong mariola that God will heal her  - Palliative care on board  - Had GOC conversation 2/5 (see GOC note). Pt still ambivalent about speaking with medical oncology. She does not want to receive chemotherapy.  - Per GOC discussions, pt DNR/DNI. Family currently not ready for hospice but will continue to think it over. Agreeable for discharge to rehab today.  - agrees to blood transfusions.

## 2019-02-16 NOTE — PROVIDER CONTACT NOTE (OTHER) - ACTION/TREATMENT ORDERED:
Md Aguirre made aware of pt. elevated BP. No new orders at this time.
Continue to monitor and educate patient the uses and benefits of medication
Continue to monitor for now.
Continue to monitor for now.
Continue to monitor patient and recheck blood pressure about an hour about an hour giving amlodipine.
Dr Hager notified and aware. no interventions ordered at this time. nursing care to continue
MD Gomez at bedside. No setting on portable vac but running properly as per MD Gomez. Portable wound vac in place and suctioning. No leak detected. Will continue to assess dressing & vac q2hrs.
MD Hager notified and aware that patient refusing MRI. No further intervention ordered at this time. Will continue to monitor patient
MD aware & to speak to patient.
MD dorado.
MD dorado.
MD notified and aware. No interventions at this time. Continue to monitor.
Md Hager made aware of pt. elevated BP. No new orders at this time.
hydralazine to manage blood pressure, ativan to manage seizure like activity. Non-rebreather for patient. CT of the head to be done for patient to rule out stroke.

## 2019-02-16 NOTE — PROGRESS NOTE ADULT - PROBLEM SELECTOR PLAN 7
- amlodipine 5mg PO QD   - DASH diet
- ECHO, ECG, + Anemia,
- ECHO, ECG, + Anemia,  - will transfuse 1U pRBCs today afternoon
- amlodipine 5mg PO QD   - DASH diet
- stable in ~200s   - likely in the setting of metastatic breast ca to bone  - ggt mildly high   - monitor LFTs

## 2019-02-16 NOTE — PROGRESS NOTE ADULT - PROBLEM SELECTOR PLAN 4
- Presented with R fungating breast mass. Has mets to brain, lungs, adrenals, bone.  - s/p palliative total mastectomy of R breast on 2/11; wound vac in place  -Per CTA chest, neg for PE though there is mass effect on RUL pulmonary artery. Currently breathing on RA.  - oxycodone 2.5mg q6h prn for pain  - f/u surg recs - will need wound vac change today (either here or at rehab) and then changed on MWF schedule. - Presented with R fungating breast mass. Has mets to brain, lungs, adrenals, bone.  - s/p palliative total mastectomy of R breast on 2/11; wound vac in place  -Per CTA chest, neg for PE though there is mass effect on RUL pulmonary artery. Currently breathing on RA.  - oxycodone 2.5mg q6h prn for pain  - f/u plastic's rec's. Wound VAC changed today. Pt will need to keep wound vac on until follow up appt w/ Dr. Goldberg within 1 week. Keep tegaderm on RLE wound, also not to be removed until f/u appt.

## 2019-02-16 NOTE — PROGRESS NOTE ADULT - ASSESSMENT
68F s/p R radical mastectomy, 15x20 STSG from R thigh on 2/11/19    Neuro: Keppra for seizure ppx per neuro  Resp: IS  GI: Regular diet, bowel reg  MSK: WBAT, PT/OT  Heme: DVT PPX per primary team/neuro  Wound vac change today keep on for 5 more days and the follow up with Dr. Goldberg (vac cannot come off until follow up with Dr. Goldberg)  ([id number])  Keep tegaderm on RLE wound, do not remove until follow up with Dr. Goldberg   Excellent care per primary team   Dispo: rehab today     [id number] 68F s/p R radical mastectomy, 15x20 STSG from R thigh on 2/11/19    Neuro: Keppra for seizure ppx per neuro  Resp: IS  GI: Regular diet, bowel reg  MSK: WBAT, PT/OT  Heme: DVT PPX per primary team/neuro  Wound vac change today keep on until follow up with Dr. Goldberg within 1 wk  ([id number])  Keep tegaderm on RLE wound, do not remove until follow up with Dr. Goldberg   Excellent care per primary team   Dispo: rehab today     [id number]

## 2019-02-16 NOTE — PROGRESS NOTE ADULT - PROBLEM SELECTOR PLAN 3
- from long standing metastatic breast cancer with resulting AOCI  - originally presented w/ symptomatic anemia Hgb < 7  - Hgb hovers around 7.5 but 6.2 this AM. No signs of overt bleeding. No dark/tarry stools, no BRBPR. Have not checked CBC in several days (likely did not drop acutely). Will transfuse 2u pRBC & f/u post transfusion CBC.  - patient/daughter do not want invasive measures - from long standing metastatic breast cancer with resulting AOCI  - originally presented w/ symptomatic anemia Hgb < 7  - Hgb hovers around 7.5 but 6.2 this AM. No signs of overt bleeding. No dark/tarry stools, no BRBPR. Have not checked CBC in several days (likely did not drop acutely). Transfused 2/15 2u pRBC with appropriate response. CBC stable this AM.  - patient/daughter do not want invasive measures - from long standing metastatic breast cancer with resulting AOCI  - originally presented w/ symptomatic anemia Hgb < 7  - Hgb hovers around 7.5 but 6.2 yesterday AM. No signs of overt bleeding. No dark/tarry stools, no BRBPR. Have not checked CBC in several days (likely did not drop acutely). Transfused 2/15 2u pRBC with appropriate response. CBC stable this AM.  - patient/daughter do not want invasive measures

## 2019-02-16 NOTE — PROGRESS NOTE ADULT - PROBLEM SELECTOR PLAN 5
- secondary to metastatic breast cancer  - MRI head with >15 metastatic lesions within the brain. Keppra 500 bid po.  - will need whole brain radiation per radiation oncology. Can be revisited as outpt.

## 2019-02-16 NOTE — PROGRESS NOTE ADULT - ATTENDING COMMENTS
Pt was seen and examined. Pt appears comfortable, no complaints.   R fungating breast mass and constitutional sx. Imaging with brain mets, lung mets, LN mets and adrenal mets; suspect breast cancer as primary tumor. s/p palliative R total mastectomy on 2/11/2019. c/w keppra for sz ppx. wound care per plastic sx  Anemia, no s/s of bleeding. likely AOCI in this patient with metastatic cancer. s/p 2 units pRBCs on 2/15. trend h/h Pt was seen and examined. Pt appears comfortable, no complaints, answering questions properly.   R fungating breast mass and constitutional sx. Imaging with brain mets, lung mets, LN mets and adrenal mets; suspect breast cancer as primary tumor. s/p palliative R total mastectomy on 2/11/2019. c/w keppra for sz ppx. wound care per plastic sx  Anemia, no s/s of bleeding. likely AOCI in this patient with metastatic cancer. s/p 2 units pRBCs on 2/15. trend h/h  Overall prognosis is guarded   Time 40 min

## 2019-02-16 NOTE — PROGRESS NOTE ADULT - REASON FOR ADMISSION
Symptomatic anemia, Rt Fungating Breast mass ,

## 2019-02-16 NOTE — PROGRESS NOTE ADULT - SUBJECTIVE AND OBJECTIVE BOX
***************************************************************  Micha Bear MD Pgy-1  Contact/Pager [phone] / [phone]  ***************************************************************    TOMEKA PEREZ  68y  MRN: [id number]      Patient is a 68y old  Female who presents with a chief complaint of Symptomatic anemia, Rt Fungating Breast mass , (15 Feb 2019 08:16)      Subjective/ON Events: Transfused 2u pRBC yesterday with appropriate response. Otherwise no acute events, VSS. No signs of overt bleeding. No melena or BRBPR.      MEDICATIONS  (STANDING):  amLODIPine   Tablet 5 milliGRAM(s) Oral daily  enoxaparin Injectable 40 milliGRAM(s) SubCutaneous daily  lactated ringers. 1000 milliLiter(s) (75 mL/Hr) IV Continuous <Continuous>  lactobacillus acidophilus 1 Tablet(s) Oral every 12 hours  levETIRAcetam  Solution 500 milliGRAM(s) Oral two times a day  potassium phosphate / sodium phosphate powder 1 Packet(s) Oral daily  senna 2 Tablet(s) Oral at bedtime    MEDICATIONS  (PRN):  acetaminophen   Tablet .. 650 milliGRAM(s) Oral every 6 hours PRN Mild Pain (1 - 3)  oxyCODONE    IR 2.5 milliGRAM(s) Oral every 6 hours PRN Moderate and Severe Pain (4 - 10)  polyethylene glycol 3350 17 Gram(s) Oral daily PRN Constipation        Objective:    Vitals: Vital Signs Last 24 Hrs  T(C): 37 (02-16-19 @ 05:16), Max: 37.3 (02-15-19 @ 16:51)  T(F): 98.6 (02-16-19 @ 05:16), Max: 99.2 (02-15-19 @ 16:51)  HR: 105 (02-16-19 @ 05:16) (100 - 108)  BP: 154/87 (02-16-19 @ 05:16) (136/85 - 169/88)  RR: 18 (02-16-19 @ 05:16) (17 - 18)  SpO2: 97% (02-16-19 @ 05:16) (90% - 99%)                  I&O's Summary    15 Feb 2019 07:01  -  16 Feb 2019 07:00  --------------------------------------------------------  IN: 250 mL / OUT: 350 mL / NET: -100 mL        PHYSICAL EXAM:  GENERAL: NAD  HEAD:  Atraumatic, Normocephalic  EYES: EOMI, PERRLA, conjunctiva and sclera clear  CHEST/LUNG: Clear to percussion bilaterally; No rales, rhonchi, wheezing, or rubs  HEART: Regular rate and rhythm; No murmurs, rubs, or gallops  ABDOMEN: Soft, Nontender, Nondistended; Bowel sounds present  SKIN: No rashes or lesions  NERVOUS SYSTEM:  Alert & Oriented X3, Good concentration; Motor Strength 5/5 B/L upper and lower extremities                                           LABS:  02-15    141  |  102  |  5<L>  ----------------------------<  61<L>  3.1<L>   |  28  |  0.35<L>  02-13    135  |  99  |  7   ----------------------------<  59<L>  4.7   |  21<L>  |  0.25<L>    Ca    7.9<L>      15 Feb 2019 06:10  Ca    8.0<L>      13 Feb 2019 10:25  Phos  2.0     02-15  Mg     1.8     02-15                          10.0   16.58 )-----------( 246      ( 16 Feb 2019 01:20 )             31.9                         6.2    14.74 )-----------( 288      ( 15 Feb 2019 06:10 )             20.3 ***************************************************************  Micha Bear MD Pgy-1  Contact/Pager [phone] / [phone]  ***************************************************************    TOMEKA PEREZ  68y  MRN: [id number]      Patient is a 68y old  Female who presents with a chief complaint of Symptomatic anemia, Rt Fungating Breast mass , (15 Feb 2019 08:16)      Subjective/ON Events: Transfused 2u pRBC yesterday with appropriate response. Otherwise no acute events, VSS. No signs of overt bleeding. No melena or BRBPR. Wound VAC changed this AM by plastics.      MEDICATIONS  (STANDING):  amLODIPine   Tablet 5 milliGRAM(s) Oral daily  enoxaparin Injectable 40 milliGRAM(s) SubCutaneous daily  lactated ringers. 1000 milliLiter(s) (75 mL/Hr) IV Continuous <Continuous>  lactobacillus acidophilus 1 Tablet(s) Oral every 12 hours  levETIRAcetam  Solution 500 milliGRAM(s) Oral two times a day  potassium phosphate / sodium phosphate powder 1 Packet(s) Oral daily  senna 2 Tablet(s) Oral at bedtime    MEDICATIONS  (PRN):  acetaminophen   Tablet .. 650 milliGRAM(s) Oral every 6 hours PRN Mild Pain (1 - 3)  oxyCODONE    IR 2.5 milliGRAM(s) Oral every 6 hours PRN Moderate and Severe Pain (4 - 10)  polyethylene glycol 3350 17 Gram(s) Oral daily PRN Constipation        Objective:    Vitals: Vital Signs Last 24 Hrs  T(C): 37 (02-16-19 @ 05:16), Max: 37.3 (02-15-19 @ 16:51)  T(F): 98.6 (02-16-19 @ 05:16), Max: 99.2 (02-15-19 @ 16:51)  HR: 105 (02-16-19 @ 05:16) (100 - 108)  BP: 154/87 (02-16-19 @ 05:16) (136/85 - 169/88)  RR: 18 (02-16-19 @ 05:16) (17 - 18)  SpO2: 97% (02-16-19 @ 05:16) (90% - 99%)                  I&O's Summary    15 Feb 2019 07:01  -  16 Feb 2019 07:00  --------------------------------------------------------  IN: 250 mL / OUT: 350 mL / NET: -100 mL        PHYSICAL EXAM:  GENERAL: lethargic  HEAD:  Atraumatic, Normocephalic  EYES: EOMI, PERRLA, conjunctiva and sclera clear  CHEST/LUNG: Clear to auscultation bilaterally; No rales, rhonchi, wheezing, or rubs  HEART: mildly tachycardic, regular rhythm; No murmurs, rubs, or gallops  ABDOMEN: Soft, Nontender, Nondistended; Bowel sounds present  SKIN: R mastectomy incision w/ wound vac in place. No rashes or lesions  NERVOUS SYSTEM:  opens eyes to voice, occasionally follows commands                                           LABS:  02-15    141  |  102  |  5<L>  ----------------------------<  61<L>  3.1<L>   |  28  |  0.35<L>  02-13    135  |  99  |  7   ----------------------------<  59<L>  4.7   |  21<L>  |  0.25<L>    Ca    7.9<L>      15 Feb 2019 06:10  Ca    8.0<L>      13 Feb 2019 10:25  Phos  2.0     02-15  Mg     1.8     02-15                          10.0   16.58 )-----------( 246      ( 16 Feb 2019 01:20 )             31.9                         6.2    14.74 )-----------( 288      ( 15 Feb 2019 06:10 )             20.3

## 2019-02-16 NOTE — PROGRESS NOTE ADULT - PROBLEM SELECTOR PROBLEM 8
Alkaline phosphatase elevation
Preventive measure
Preventive measure
SOB (shortness of breath)

## 2019-02-16 NOTE — PROGRESS NOTE ADULT - PROVIDER SPECIALTY LIST ADULT
Anesthesia
Infectious Disease
Infectious Disease
Internal Medicine
Neurology
Neurosurgery
Palliative Care
Plastic Surgery
Surgery
Internal Medicine
Palliative Care
Internal Medicine

## 2019-02-16 NOTE — PROGRESS NOTE ADULT - PROBLEM SELECTOR PROBLEM 1
Dyspnea
Dyspnea
Goals of care, counseling/discussion
Mormonism or spiritual beliefs affecting medical care
Mosque or spiritual beliefs affecting medical care
Pain of right lower extremity
Pain of right lower extremity
Dyspnea
Pain of right lower extremity
Goals of care, counseling/discussion
Dyspnea
Goals of care, counseling/discussion
Breast mass, right

## 2019-02-16 NOTE — PROGRESS NOTE ADULT - SUBJECTIVE AND OBJECTIVE BOX
Pt switched to portable Prevena VAC for transport. Patient to switch to full sized VAC machine at rehab center.    Explained to family that switch to Prevena VAC was temporary solution to avoid disruption of dressing to delicate new stsg site.     Patient tolerated switch well. No pain. No bleeding. VAC sponge holding suction, no leak on Prevena detected.

## 2019-02-16 NOTE — PROVIDER CONTACT NOTE (OTHER) - ASSESSMENT
pt taken off full sized vac machine and placed on portable vac for transport by MD Gomez. No setting noted on portable vac.

## 2019-02-21 ENCOUNTER — INPATIENT (INPATIENT)
Facility: HOSPITAL | Age: 69
LOS: 8 days | End: 2019-03-02
Attending: HOSPITALIST | Admitting: HOSPITALIST
Payer: MEDICARE

## 2019-02-21 VITALS
HEART RATE: 110 BPM | TEMPERATURE: 98 F | RESPIRATION RATE: 16 BRPM | DIASTOLIC BLOOD PRESSURE: 98 MMHG | OXYGEN SATURATION: 97 % | SYSTOLIC BLOOD PRESSURE: 148 MMHG

## 2019-02-21 DIAGNOSIS — A41.9 SEPSIS, UNSPECIFIED ORGANISM: ICD-10-CM

## 2019-02-21 LAB
ALBUMIN SERPL ELPH-MCNC: 1.8 G/DL — LOW (ref 3.3–5)
ALP SERPL-CCNC: 585 U/L — HIGH (ref 40–120)
ALT FLD-CCNC: 12 U/L — SIGNIFICANT CHANGE UP (ref 4–33)
ANION GAP SERPL CALC-SCNC: 16 MMO/L — HIGH (ref 7–14)
ANISOCYTOSIS BLD QL: SLIGHT — SIGNIFICANT CHANGE UP
APTT BLD: 23.9 SEC — LOW (ref 27.5–36.3)
AST SERPL-CCNC: 103 U/L — HIGH (ref 4–32)
B PERT DNA SPEC QL NAA+PROBE: NOT DETECTED — SIGNIFICANT CHANGE UP
BASE EXCESS BLDV CALC-SCNC: 7.9 MMOL/L — SIGNIFICANT CHANGE UP
BASE EXCESS BLDV CALC-SCNC: 9.2 MMOL/L — SIGNIFICANT CHANGE UP
BASOPHILS # BLD AUTO: 0.07 K/UL — SIGNIFICANT CHANGE UP (ref 0–0.2)
BASOPHILS NFR BLD AUTO: 0.6 % — SIGNIFICANT CHANGE UP (ref 0–2)
BASOPHILS NFR SPEC: 0 % — SIGNIFICANT CHANGE UP (ref 0–2)
BILIRUB SERPL-MCNC: 0.5 MG/DL — SIGNIFICANT CHANGE UP (ref 0.2–1.2)
BLASTS # FLD: 0 % — SIGNIFICANT CHANGE UP (ref 0–0)
BLOOD GAS VENOUS - CREATININE: < 36 MG/DL — HIGH (ref 0.5–1.3)
BLOOD GAS VENOUS - CREATININE: SIGNIFICANT CHANGE UP MG/DL (ref 0.5–1.3)
BUN SERPL-MCNC: 4 MG/DL — LOW (ref 7–23)
C PNEUM DNA SPEC QL NAA+PROBE: NOT DETECTED — SIGNIFICANT CHANGE UP
CALCIUM SERPL-MCNC: 8 MG/DL — LOW (ref 8.4–10.5)
CHLORIDE BLDV-SCNC: 101 MMOL/L — SIGNIFICANT CHANGE UP (ref 96–108)
CHLORIDE BLDV-SCNC: 101 MMOL/L — SIGNIFICANT CHANGE UP (ref 96–108)
CHLORIDE SERPL-SCNC: 98 MMOL/L — SIGNIFICANT CHANGE UP (ref 98–107)
CO2 SERPL-SCNC: 29 MMOL/L — SIGNIFICANT CHANGE UP (ref 22–31)
CREAT SERPL-MCNC: 0.26 MG/DL — LOW (ref 0.5–1.3)
EOSINOPHIL # BLD AUTO: 0.01 K/UL — SIGNIFICANT CHANGE UP (ref 0–0.5)
EOSINOPHIL NFR BLD AUTO: 0.1 % — SIGNIFICANT CHANGE UP (ref 0–6)
EOSINOPHIL NFR FLD: 0 % — SIGNIFICANT CHANGE UP (ref 0–6)
FLUAV H1 2009 PAND RNA SPEC QL NAA+PROBE: NOT DETECTED — SIGNIFICANT CHANGE UP
FLUAV H1 RNA SPEC QL NAA+PROBE: NOT DETECTED — SIGNIFICANT CHANGE UP
FLUAV H3 RNA SPEC QL NAA+PROBE: NOT DETECTED — SIGNIFICANT CHANGE UP
FLUAV SUBTYP SPEC NAA+PROBE: NOT DETECTED — SIGNIFICANT CHANGE UP
FLUBV RNA SPEC QL NAA+PROBE: NOT DETECTED — SIGNIFICANT CHANGE UP
GAS PNL BLDV: 137 MMOL/L — SIGNIFICANT CHANGE UP (ref 136–146)
GAS PNL BLDV: 141 MMOL/L — SIGNIFICANT CHANGE UP (ref 136–146)
GIANT PLATELETS BLD QL SMEAR: PRESENT — SIGNIFICANT CHANGE UP
GLUCOSE BLDV-MCNC: 104 — HIGH (ref 70–99)
GLUCOSE BLDV-MCNC: 110 — HIGH (ref 70–99)
GLUCOSE SERPL-MCNC: 104 MG/DL — HIGH (ref 70–99)
HADV DNA SPEC QL NAA+PROBE: NOT DETECTED — SIGNIFICANT CHANGE UP
HCO3 BLDV-SCNC: 31 MMOL/L — HIGH (ref 20–27)
HCO3 BLDV-SCNC: 33 MMOL/L — HIGH (ref 20–27)
HCOV PNL SPEC NAA+PROBE: SIGNIFICANT CHANGE UP
HCT VFR BLD CALC: 32 % — LOW (ref 34.5–45)
HCT VFR BLDV CALC: 17.3 % — CRITICAL LOW (ref 34.5–45)
HCT VFR BLDV CALC: 27 % — LOW (ref 34.5–45)
HGB BLD-MCNC: 10.2 G/DL — LOW (ref 11.5–15.5)
HGB BLDV-MCNC: 5.5 G/DL — CRITICAL LOW (ref 11.5–15.5)
HGB BLDV-MCNC: 8.7 G/DL — LOW (ref 11.5–15.5)
HMPV RNA SPEC QL NAA+PROBE: NOT DETECTED — SIGNIFICANT CHANGE UP
HPIV1 RNA SPEC QL NAA+PROBE: NOT DETECTED — SIGNIFICANT CHANGE UP
HPIV2 RNA SPEC QL NAA+PROBE: NOT DETECTED — SIGNIFICANT CHANGE UP
HPIV3 RNA SPEC QL NAA+PROBE: NOT DETECTED — SIGNIFICANT CHANGE UP
HPIV4 RNA SPEC QL NAA+PROBE: NOT DETECTED — SIGNIFICANT CHANGE UP
HYPOCHROMIA BLD QL: SLIGHT — SIGNIFICANT CHANGE UP
IMM GRANULOCYTES NFR BLD AUTO: 5.2 % — HIGH (ref 0–1.5)
INR BLD: 1.33 — HIGH (ref 0.88–1.17)
LACTATE BLDV-MCNC: 3.7 MMOL/L — HIGH (ref 0.5–2)
LACTATE BLDV-MCNC: 5.1 MMOL/L — CRITICAL HIGH (ref 0.5–2)
LACTATE BLDV-MCNC: 5.1 MMOL/L — CRITICAL HIGH (ref 0.5–2)
LYMPHOCYTES # BLD AUTO: 1.78 K/UL — SIGNIFICANT CHANGE UP (ref 1–3.3)
LYMPHOCYTES # BLD AUTO: 14.5 % — SIGNIFICANT CHANGE UP (ref 13–44)
LYMPHOCYTES NFR SPEC AUTO: 5.2 % — LOW (ref 13–44)
MACROCYTES BLD QL: SLIGHT — SIGNIFICANT CHANGE UP
MCHC RBC-ENTMCNC: 28.5 PG — SIGNIFICANT CHANGE UP (ref 27–34)
MCHC RBC-ENTMCNC: 31.9 % — LOW (ref 32–36)
MCV RBC AUTO: 89.4 FL — SIGNIFICANT CHANGE UP (ref 80–100)
METAMYELOCYTES # FLD: 0.9 % — SIGNIFICANT CHANGE UP (ref 0–1)
MONOCYTES # BLD AUTO: 0.46 K/UL — SIGNIFICANT CHANGE UP (ref 0–0.9)
MONOCYTES NFR BLD AUTO: 3.8 % — SIGNIFICANT CHANGE UP (ref 2–14)
MONOCYTES NFR BLD: 4.4 % — SIGNIFICANT CHANGE UP (ref 2–9)
MYELOCYTES NFR BLD: 1.7 % — HIGH (ref 0–0)
NEUTROPHIL AB SER-ACNC: 87.8 % — HIGH (ref 43–77)
NEUTROPHILS # BLD AUTO: 9.29 K/UL — HIGH (ref 1.8–7.4)
NEUTROPHILS NFR BLD AUTO: 75.8 % — SIGNIFICANT CHANGE UP (ref 43–77)
NEUTS BAND # BLD: 0 % — SIGNIFICANT CHANGE UP (ref 0–6)
NRBC # FLD: 0.02 K/UL — LOW (ref 25–125)
OTHER - HEMATOLOGY %: 0 — SIGNIFICANT CHANGE UP
PCO2 BLDV: 38 MMHG — LOW (ref 41–51)
PCO2 BLDV: 40 MMHG — LOW (ref 41–51)
PH BLDV: 7.5 PH — HIGH (ref 7.32–7.43)
PH BLDV: 7.54 PH — HIGH (ref 7.32–7.43)
PLATELET # BLD AUTO: 237 K/UL — SIGNIFICANT CHANGE UP (ref 150–400)
PLATELET COUNT - ESTIMATE: NORMAL — SIGNIFICANT CHANGE UP
PMV BLD: 10.9 FL — SIGNIFICANT CHANGE UP (ref 7–13)
PO2 BLDV: 37 MMHG — SIGNIFICANT CHANGE UP (ref 35–40)
PO2 BLDV: 83 MMHG — HIGH (ref 35–40)
POLYCHROMASIA BLD QL SMEAR: SLIGHT — SIGNIFICANT CHANGE UP
POTASSIUM BLDV-SCNC: 2.7 MMOL/L — CRITICAL LOW (ref 3.4–4.5)
POTASSIUM BLDV-SCNC: 2.8 MMOL/L — CRITICAL LOW (ref 3.4–4.5)
POTASSIUM SERPL-MCNC: 3 MMOL/L — LOW (ref 3.5–5.3)
POTASSIUM SERPL-SCNC: 3 MMOL/L — LOW (ref 3.5–5.3)
PROMYELOCYTES # FLD: 0 % — SIGNIFICANT CHANGE UP (ref 0–0)
PROT SERPL-MCNC: 5.8 G/DL — LOW (ref 6–8.3)
PROTHROM AB SERPL-ACNC: 15.3 SEC — HIGH (ref 9.8–13.1)
RBC # BLD: 3.58 M/UL — LOW (ref 3.8–5.2)
RBC # FLD: 18.1 % — HIGH (ref 10.3–14.5)
RSV RNA SPEC QL NAA+PROBE: NOT DETECTED — SIGNIFICANT CHANGE UP
RV+EV RNA SPEC QL NAA+PROBE: NOT DETECTED — SIGNIFICANT CHANGE UP
SAO2 % BLDV: 68 % — SIGNIFICANT CHANGE UP (ref 60–85)
SAO2 % BLDV: 97.6 % — HIGH (ref 60–85)
SODIUM SERPL-SCNC: 143 MMOL/L — SIGNIFICANT CHANGE UP (ref 135–145)
VARIANT LYMPHS # BLD: 0 % — SIGNIFICANT CHANGE UP
WBC # BLD: 12.25 K/UL — HIGH (ref 3.8–10.5)
WBC # FLD AUTO: 12.25 K/UL — HIGH (ref 3.8–10.5)

## 2019-02-21 PROCEDURE — 99291 CRITICAL CARE FIRST HOUR: CPT

## 2019-02-21 PROCEDURE — 70450 CT HEAD/BRAIN W/O DYE: CPT | Mod: 26

## 2019-02-21 PROCEDURE — 71045 X-RAY EXAM CHEST 1 VIEW: CPT | Mod: 26

## 2019-02-21 RX ORDER — VANCOMYCIN HCL 1 G
1000 VIAL (EA) INTRAVENOUS ONCE
Qty: 0 | Refills: 0 | Status: COMPLETED | OUTPATIENT
Start: 2019-02-21 | End: 2019-02-21

## 2019-02-21 RX ORDER — POTASSIUM CHLORIDE 20 MEQ
20 PACKET (EA) ORAL ONCE
Qty: 0 | Refills: 0 | Status: DISCONTINUED | OUTPATIENT
Start: 2019-02-21 | End: 2019-02-21

## 2019-02-21 RX ORDER — PIPERACILLIN AND TAZOBACTAM 4; .5 G/20ML; G/20ML
3.38 INJECTION, POWDER, LYOPHILIZED, FOR SOLUTION INTRAVENOUS ONCE
Qty: 0 | Refills: 0 | Status: COMPLETED | OUTPATIENT
Start: 2019-02-21 | End: 2019-02-21

## 2019-02-21 RX ORDER — SODIUM CHLORIDE 9 MG/ML
2000 INJECTION, SOLUTION INTRAVENOUS ONCE
Qty: 0 | Refills: 0 | Status: COMPLETED | OUTPATIENT
Start: 2019-02-21 | End: 2019-02-21

## 2019-02-21 RX ORDER — IBUPROFEN 200 MG
600 TABLET ORAL ONCE
Qty: 0 | Refills: 0 | Status: COMPLETED | OUTPATIENT
Start: 2019-02-21 | End: 2019-02-21

## 2019-02-21 RX ORDER — ACETAMINOPHEN 500 MG
1000 TABLET ORAL ONCE
Qty: 0 | Refills: 0 | Status: COMPLETED | OUTPATIENT
Start: 2019-02-21 | End: 2019-02-21

## 2019-02-21 RX ORDER — LEVETIRACETAM 250 MG/1
1000 TABLET, FILM COATED ORAL ONCE
Qty: 0 | Refills: 0 | Status: COMPLETED | OUTPATIENT
Start: 2019-02-21 | End: 2019-02-21

## 2019-02-21 RX ORDER — ACETAMINOPHEN 500 MG
650 TABLET ORAL ONCE
Qty: 0 | Refills: 0 | Status: DISCONTINUED | OUTPATIENT
Start: 2019-02-21 | End: 2019-02-21

## 2019-02-21 RX ORDER — POTASSIUM CHLORIDE 20 MEQ
10 PACKET (EA) ORAL
Qty: 0 | Refills: 0 | Status: COMPLETED | OUTPATIENT
Start: 2019-02-21 | End: 2019-02-22

## 2019-02-21 RX ADMIN — SODIUM CHLORIDE 2000 MILLILITER(S): 9 INJECTION, SOLUTION INTRAVENOUS at 17:27

## 2019-02-21 RX ADMIN — Medication 1000 MILLIGRAM(S): at 18:45

## 2019-02-21 RX ADMIN — LEVETIRACETAM 400 MILLIGRAM(S): 250 TABLET, FILM COATED ORAL at 20:30

## 2019-02-21 RX ADMIN — Medication 250 MILLIGRAM(S): at 18:25

## 2019-02-21 RX ADMIN — PIPERACILLIN AND TAZOBACTAM 200 GRAM(S): 4; .5 INJECTION, POWDER, LYOPHILIZED, FOR SOLUTION INTRAVENOUS at 17:27

## 2019-02-21 RX ADMIN — Medication 1000 MILLIGRAM(S): at 19:25

## 2019-02-21 RX ADMIN — Medication 600 MILLIGRAM(S): at 19:00

## 2019-02-21 RX ADMIN — LEVETIRACETAM 1000 MILLIGRAM(S): 250 TABLET, FILM COATED ORAL at 20:42

## 2019-02-21 RX ADMIN — Medication 600 MILLIGRAM(S): at 20:00

## 2019-02-21 RX ADMIN — Medication 1000 MILLIGRAM(S): at 18:30

## 2019-02-21 RX ADMIN — Medication 100 MILLIEQUIVALENT(S): at 23:28

## 2019-02-21 RX ADMIN — Medication 400 MILLIGRAM(S): at 18:11

## 2019-02-21 RX ADMIN — Medication 2 MILLIGRAM(S): at 20:21

## 2019-02-21 NOTE — CONSULT NOTE ADULT - SUBJECTIVE AND OBJECTIVE BOX
HPI:  68yoF s/p R radical mastectomy, 15x20 STSG from R thigh on 2/11/19, seizure disorder, and metastatic breast cancer discharged 5 days ago 2/16 presents for nonfunctional chest wound vac and draining around right STSG site. According to the rehab nurse, the patient was discharged from [facility] with correct wound vac instructions but never received the proper wound vac at the rehab facility. A prevena wound vac has been connected and has not been holding suction leading to drainage around right chest vac. In addition, her right STSG site dressing was not changed leading to fluid buildup. On presentation in ED she was febirle to 101F and tachy to 120. SBP 140s.      PAST MEDICAL & SURGICAL HISTORY:  No pertinent past medical history  No significant past surgical history      MEDICATIONS  (STANDING):  lactated ringers Bolus 2000 milliLiter(s) IV Bolus once  piperacillin/tazobactam IVPB. 3.375 Gram(s) IV Intermittent once  vancomycin  IVPB 1000 milliGRAM(s) IV Intermittent once    MEDICATIONS  (PRN):      Allergies    No Known Allergies    Intolerances        SOCIAL HISTORY:    FAMILY HISTORY:  Family history of pancreatic cancer (Father)  Family history of breast cancer in sister          Physical Exam:  General: NAD, resting comfortably  HEENT: NC/AT, EOMI,   Pulmonary: nonlabored  Cardiovascular: tachycardic  Chest: nonfunctional wound vac, fluid buildup, vac removed, incision with granulation tissue throughout, not purulent, not foul smelling, not bleeding, aquacel applied with allevyn over  RLE: STSG site with dressing covering fluid collections, dressing removed, wound wiped dry, wound clean, not purulent, not foul smelling, no bleeding, left open to air to heal     Vital Signs Last 24 Hrs  T(C): 38.4 (21 Feb 2019 16:00), Max: 38.4 (21 Feb 2019 16:00)  T(F): 101.1 (21 Feb 2019 16:00), Max: 101.1 (21 Feb 2019 16:00)  HR: 120 (21 Feb 2019 16:00) (110 - 120)  BP: 143/119 (21 Feb 2019 16:00) (143/119 - 148/98)  BP(mean): --  RR: 22 (21 Feb 2019 16:00) (16 - 22)  SpO2: 98% (21 Feb 2019 16:00) (97% - 98%)    I&O's Summary          LABS:                        10.2   12.25 )-----------( 237      ( 21 Feb 2019 16:24 )             32.0           PT/INR - ( 21 Feb 2019 16:24 )   PT: 15.3 SEC;   INR: 1.33          PTT - ( 21 Feb 2019 16:24 )  PTT:23.9 SEC    CAPILLARY BLOOD GLUCOSE            Cultures:      RADIOLOGY & ADDITIONAL STUDIES:

## 2019-02-21 NOTE — CONSULT NOTE ADULT - SUBJECTIVE AND OBJECTIVE BOX
Neurology consult    TOMEKA [id number]    chief complaint of seizure    HPI: 68yoF s/p R radical mastectomy, skin graft from thigh on 2/11/19,seizure d/o 2/2 metastatic breast cancer on Keppra 500 BID presents for nonfunctional chest wound vac and draining around right breast surgical site. PEr family at bedside patient had left sided focal shaking initally leg spreading to arm and face.  Patient was given 2mg ativan and 1 g Keppra Patient has baseline mental status AOX3 with some cognitive difficulties. PEr daughter, patient does not want increased dosage of keppra. physical therapy was on steroids on the past, but made her loopy. Patient is comfort care      REVIEW OF SYSTEMS:    see HPI  MEDICATIONS    potassium chloride  10 mEq/100 mL IVPB 10 milliEquivalent(s) IV Intermittent every 1 hour      PMH: No pertinent past medical history       PSH: No significant past surgical history      Family history: No history of dementia, strokes, or seizures   FAMILY HISTORY:  Family history of pancreatic cancer  Family history of breast cancer in sister      SOCIAL HISTORY:  No history of tobacco or alcohol use     Allergies    No Known Allergies    Intolerances            Vital Signs Last 24 Hrs  T(C): 36.7 (21 Feb 2019 20:20), Max: 38.4 (21 Feb 2019 16:00)  T(F): 98.1 (21 Feb 2019 20:20), Max: 101.1 (21 Feb 2019 16:00)  HR: 102 (21 Feb 2019 22:05) (102 - 129)  BP: 124/77 (21 Feb 2019 22:05) (124/77 - 196/118)  BP(mean): --  RR: 22 (21 Feb 2019 22:05) (16 - 33)  SpO2: 100% (21 Feb 2019 22:05) (97% - 100%)      On Neurological Examination:    Mental Status - eyes closed grimaces to pain. limited by sedation s/p ativan  Cranial Nerves -  3mm bilaterally , no gross facial asymmetry, tongue/uvula midline    Motor Exam -   limited by sedation withdraws    Sensory    deferred    Coord: deferred    Gait -  deferred                                           LABS:  CBC Full  -  ( 21 Feb 2019 16:24 )  WBC Count : 12.25 K/uL  Hemoglobin : 10.2 g/dL  Hematocrit : 32.0 %  Platelet Count - Automated : 237 K/uL  Mean Cell Volume : 89.4 fL  Mean Cell Hemoglobin : 28.5 pg  Mean Cell Hemoglobin Concentration : 31.9 %  Auto Neutrophil # : 9.29 K/uL  Auto Lymphocyte # : 1.78 K/uL  Auto Monocyte # : 0.46 K/uL  Auto Eosinophil # : 0.01 K/uL  Auto Basophil # : 0.07 K/uL  Auto Neutrophil % : 75.8 %  Auto Lymphocyte % : 14.5 %  Auto Monocyte % : 3.8 %  Auto Eosinophil % : 0.1 %  Auto Basophil % : 0.6 %      02-21    143  |  98  |  4<L>  ----------------------------<  104<H>  3.0<L>   |  29  |  0.26<L>    Ca    8.0<L>      21 Feb 2019 16:24    TPro  5.8<L>  /  Alb  1.8<L>  /  TBili  0.5  /  DBili  x   /  AST  103<H>  /  ALT  12  /  AlkPhos  585<H>  02-21    LIVER FUNCTIONS - ( 21 Feb 2019 16:24 )  Alb: 1.8 g/dL / Pro: 5.8 g/dL / ALK PHOS: 585 u/L / ALT: 12 u/L / AST: 103 u/L / GGT: x           Hemoglobin A1C:       PT/INR - ( 21 Feb 2019 16:24 )   PT: 15.3 SEC;   INR: 1.33          PTT - ( 21 Feb 2019 16:24 )  PTT:23.9 SEC      RADIOLOGY  CTH < from: CT Head No Cont (02.21.19 @ 21:38) >  IMPRESSION:  Intracranial metastatic disease is better visualized on prior MRI.    Within limits of a noncontrast CT scan, there has been no significant   interval change since 02/02/2019.  Repeat MRI can be performed as   clinically warranted.    < end of copied text >

## 2019-02-21 NOTE — ED ADULT NURSE REASSESSMENT NOTE - NS ED NURSE REASSESS COMMENT FT1
Pt having mild tremors, gently vesiculating head/neck - per daughter this is how she has been all day.  Pt VS improved.  Airway patent, o2sat maintained.  Will CTM closely.

## 2019-02-21 NOTE — CONSULT NOTE ADULT - ASSESSMENT
8yoF s/p R radical mastectomy, skin graft from thigh on 2/11/19,seizure d/o 2/2 metastatic breast cancer on Keppra 500 BID presents with draining around surgical seizure with focal onset seizure in the ED s/p 2 mg ativan 1 g Keppra. PE limited by sedation. CTH redemonstrates lesion with mild edema. Patient comfort care per family and would not desire increase in Keppra      -continue to observe on current regimen of Keppra 500 BID, when patient returns to [facility] would clarify Pt's concerns with Keppra, (may consider adding or converting to vimpat if SEs from Keppra)  -Ativan 0.5 mg PRN seizure  -Pt may benefit from Decadron, would need to balance with concern for infection and wound healing. Would hold for now and reassess when sedation wears off and can examine and discuss with patient  -Can clinically observe for seizure, if concern for subclinical seizures and family/patient wishes to pursue may consider EEG  -Palliative consult 68yoF s/p R radical mastectomy, skin graft from thigh on 2/11/19,seizure d/o 2/2 metastatic breast cancer on Keppra 500 BID presents with draining around surgical seizure with focal onset seizure in the ED s/p 2 mg ativan 1 g Keppra. PE limited by sedation. CTH redemonstrates lesion with mild edema. Patient comfort care per family and would not desire increase in Keppra      -continue to observe on current regimen of Keppra 500 BID, when patient returns to [facility] would clarify Pt's concerns with Keppra, (may consider adding or converting to vimpat if SEs from Keppra)  -Ativan 0.5 mg PRN seizure  -Pt may benefit from Decadron, would need to balance with concern for infection and wound healing. Would hold for now and reassess when sedation wears off and can examine and discuss with patient  -Can clinically observe for seizure, if concern for subclinical seizures and family/patient wishes to pursue may consider EEG  -Palliative consult

## 2019-02-21 NOTE — ED PROVIDER NOTE - PROGRESS NOTE DETAILS
Plastic surgery consulted, wound vac changed, consultant stating unlikely source of fever. MICU consulted for uptrending lactate. Adrienne att: Called to the bedside as the pt is having tonic clonic seizure; 2 mg ativan given followed by 1 gram of keppra; awaiting MICU consult

## 2019-02-21 NOTE — ED ADULT TRIAGE NOTE - CHIEF COMPLAINT QUOTE
pt brought from [facility] for "persistence bleeding from graft site (inner L right), and malfunctioning wound vac (R breast), unknown when procedure done, pt unable to contribute to history, appears in NAD @ this time, wound vac beep noted on presentation

## 2019-02-21 NOTE — ED PROVIDER NOTE - OBJECTIVE STATEMENT
68 YOF pmh Stage 4 breast cancer s/p mastectomy few weeks ago. Pt presents with increased drainage from wound vac site and and wound vac stopped working. Denies fever but febrile and tachycardic here. 68 YOF pmh Stage 4 breast cancer s/p mastectomy few weeks ago. Pt presents with increased drainage from wound vac site and and wound vac stopped working. Denies fever but febrile and tachycardic here. Pt has had slight confusion from her baseline. Dressing have not been changed. No cough, no abd pain or chest pain. No dysuria, no URI symptoms.

## 2019-02-21 NOTE — CONSULT NOTE ADULT - ASSESSMENT
68yoF s/p R radical mastectomy, skin graft from thigh on 2/11/19, seizure disorder on keppra, and metastatic breast cancer discharged 5 days ago 2/16 presents for nonfunctional chest wound vac and draining around right breast surgical site. MICU consulted for lactate elevation and critical illness. 68yoF s/p R radical mastectomy, skin graft from thigh on 2/11/19, seizure disorder on keppra, and metastatic breast cancer discharged 5 days ago 2/16 presents for nonfunctional chest wound vac and draining around right breast surgical site. MICU consulted for lactate elevation and critical illness.    Sepsis:  - Patient appears septic clinically with tachycardia, fever and leukocytosis. Lactate elevation likely multifactorial from infection and seizure disorder.  - c/w abx empirically. IVF prn.  - Blood culture and urine culture ordered from ED.  - Continue to trend lactate.    Seizure disorder:  - C/w keppra at home dose.  - Ativan prn for seizure.  - Recommended repeat CTH    Disposition:  - Confirmed with family at bedside patient DNR/DNI.  - No need for MICU level of care at this time.  - Plan discussed with attending.

## 2019-02-21 NOTE — ED ADULT NURSE REASSESSMENT NOTE - NS ED NURSE REASSESS COMMENT FT1
Pt received from another RN. Patient is shaking from fever/chills. As per MD Deleon, patient to be placed on bear hugger. Patient given Tylenol IV, and IVF infusing into left #20g U/S guided. Patient remains tachycardic, EKG done. Md at bedside of patient.

## 2019-02-21 NOTE — ED ADULT NURSE NOTE - CHIEF COMPLAINT QUOTE
pt brought from [facility] for "persistence bleeding from graft site (inner L right), and malfunctioning wound vac (R breast), unknown when procedure done, pt unable to contribute to history, appears in NAD @ this time, wound vac beep noted on presentation

## 2019-02-21 NOTE — ED PROVIDER NOTE - CLINICAL SUMMARY MEDICAL DECISION MAKING FREE TEXT BOX
Sepsis with recent right mastectomy with wound vac and skin graft. Will workup for sepsis and cover with antibiotics.

## 2019-02-21 NOTE — ED PROVIDER NOTE - CRITICAL CARE PROVIDED
additional history taking/conducted a detailed discussion of DNR status/documentation/direct patient care (not related to procedure)/interpretation of diagnostic studies/consultation with other physicians/consult w/ pt's family directly relating to pts condition

## 2019-02-21 NOTE — ED ADULT NURSE REASSESSMENT NOTE - NS ED NURSE REASSESS COMMENT FT1
Pt rcvd from prior shift during handoff rpt pt's daughter at bedside alerted ED MD Deleon & RN that pt was having Seizure.  Pt assessed by MD & RN - noted to be having generalized seizure, full body shaking, pt nonresponsive, eyes rolled back in head, HR Tachy to 140s on CM.  Pt safety maintained, placed on Nonrebreather mask, 2mg IVP Ativan stat administered per MD.  Pt noted to be improving, will CTM

## 2019-02-21 NOTE — ED PROVIDER NOTE - SKIN, MLM
Right leg skin graft with purulent drainage, no additional drainage after cleaned off, just with serous drainage. Right chest wound vac removed no purulent drainage present.

## 2019-02-21 NOTE — ED ADULT NURSE REASSESSMENT NOTE - NS ED NURSE REASSESS COMMENT FT1
Pt rcvd back from CT - sedated, resting comfortably in stretcher. Rt upper thigh leg wound open to air per SICU resident who assessed pt.  Serosanguinous drainage noted.  HR STachy o2sat maintained, placed on nasal cannula.  Pt in no acute distress at this time, dtr at bedside, will CTM.

## 2019-02-21 NOTE — ED PROCEDURE NOTE - PROCEDURE ADDITIONAL DETAILS
Location: left Forearm  Dynamic grayscale imaging of the target vessel was obtained using a high frequency linear transducer. both the target vein and the surrounding arterial structures were visualized and identified. The patency of the targeted vein was confirmed with compression and lack of internal echoes. There was direct visualization of needle entry into the vein followed by successful catheter placement.     IMPRESSION:   Ultrasound cannulation of the left cephalic vein.

## 2019-02-21 NOTE — ED ADULT NURSE NOTE - NSIMPLEMENTINTERV_GEN_ALL_ED
Implemented All Fall with Harm Risk Interventions:  [place] to call system. Call bell, personal items and telephone within reach. Instruct patient to call for assistance. Room bathroom lighting operational. Non-slip footwear when patient is off stretcher. Physically safe environment: no spills, clutter or unnecessary equipment. Stretcher in lowest position, wheels locked, appropriate side rails in place. Provide visual cue, wrist band, yellow gown, etc. Monitor gait and stability. Monitor for mental status changes and reorient to person, place, and time. Review medications for side effects contributing to fall risk. Reinforce activity limits and safety measures with patient and family. Provide visual clues: red socks.

## 2019-02-21 NOTE — CONSULT NOTE ADULT - ASSESSMENT
A/P: 68yoF s/p R radical mastectomy, 15x20 STSG from R thigh on 2/11/19, seizure disorder, and metastatic breast cancer discharged 5 days ago 2/16 presents for nonfunctional chest wound vac and draining around right STSG site. Low suspicion for infection.    - Daily chest wound dressing changes: mukesh baum  - RLE: leave open to air, once dry may apply aquaphor, xeroform, and guaze/tape  - f/u labs  - monitor vital signs  - will discuss with attending     [id number]

## 2019-02-21 NOTE — ED ADULT NURSE NOTE - OBJECTIVE STATEMENT
Break Coverage RN: Patient is a 67 y/o female a&ox4 BIB family with a c/c of malfunctioning wound vac on right breast.  Patient had right sided mastectomy on 2/11, as per daughter at bedside wound vac has been malfunctioning for days.  There appears to be significant amount of drainage and moisture beneath dressing on right chest wall.  Patient denies recent fevers/chills however noted to be rigorours and febrile on exam.  Patient endorses diffuse body pain, not permitting full exam, refusing to allow this author to turn her for skin exam.  Denies SOB, CP, N/V/D, abd pain, GI/ symptoms. Break Coverage RN: Patient is a 67 y/o female a&ox4 BIB family with a c/c of malfunctioning wound vac on right breast.  Patient had right sided mastectomy on 2/11, as per daughter at bedside wound vac has been malfunctioning for days.  There appears to be significant amount of drainage and moisture beneath dressing on right chest wall.  Patient denies recent fevers/chills however noted to be rigorours and febrile on exam.  Patient endorses diffuse body pain, not permitting full exam, refusing to allow this author to turn her for skin exam.  Denies SOB, CP, N/V/D, abd pain, GI/ symptoms.  Patient also has skin graft on right inner thigh with purulent foul smelling drainage.

## 2019-02-22 DIAGNOSIS — C50.919 MALIGNANT NEOPLASM OF UNSPECIFIED SITE OF UNSPECIFIED FEMALE BREAST: ICD-10-CM

## 2019-02-22 DIAGNOSIS — R56.9 UNSPECIFIED CONVULSIONS: ICD-10-CM

## 2019-02-22 DIAGNOSIS — Z90.11 ACQUIRED ABSENCE OF RIGHT BREAST AND NIPPLE: Chronic | ICD-10-CM

## 2019-02-22 DIAGNOSIS — E87.6 HYPOKALEMIA: ICD-10-CM

## 2019-02-22 DIAGNOSIS — R06.00 DYSPNEA, UNSPECIFIED: ICD-10-CM

## 2019-02-22 DIAGNOSIS — A41.9 SEPSIS, UNSPECIFIED ORGANISM: ICD-10-CM

## 2019-02-22 DIAGNOSIS — Z71.89 OTHER SPECIFIED COUNSELING: ICD-10-CM

## 2019-02-22 DIAGNOSIS — I10 ESSENTIAL (PRIMARY) HYPERTENSION: ICD-10-CM

## 2019-02-22 DIAGNOSIS — R53.81 OTHER MALAISE: ICD-10-CM

## 2019-02-22 DIAGNOSIS — Z29.9 ENCOUNTER FOR PROPHYLACTIC MEASURES, UNSPECIFIED: ICD-10-CM

## 2019-02-22 DIAGNOSIS — Z51.5 ENCOUNTER FOR PALLIATIVE CARE: ICD-10-CM

## 2019-02-22 LAB
ALBUMIN SERPL ELPH-MCNC: 1.8 G/DL — LOW (ref 3.3–5)
ALP SERPL-CCNC: 497 U/L — HIGH (ref 40–120)
ALT FLD-CCNC: 13 U/L — SIGNIFICANT CHANGE UP (ref 4–33)
ANION GAP SERPL CALC-SCNC: 13 MMO/L — SIGNIFICANT CHANGE UP (ref 7–14)
APPEARANCE UR: CLEAR — SIGNIFICANT CHANGE UP
AST SERPL-CCNC: 88 U/L — HIGH (ref 4–32)
BACTERIA # UR AUTO: SIGNIFICANT CHANGE UP
BASOPHILS # BLD AUTO: 0.09 K/UL — SIGNIFICANT CHANGE UP (ref 0–0.2)
BASOPHILS NFR BLD AUTO: 0.8 % — SIGNIFICANT CHANGE UP (ref 0–2)
BILIRUB SERPL-MCNC: 0.4 MG/DL — SIGNIFICANT CHANGE UP (ref 0.2–1.2)
BILIRUB UR-MCNC: NEGATIVE — SIGNIFICANT CHANGE UP
BLOOD UR QL VISUAL: NEGATIVE — SIGNIFICANT CHANGE UP
BUN SERPL-MCNC: 6 MG/DL — LOW (ref 7–23)
CALCIUM SERPL-MCNC: 7.6 MG/DL — LOW (ref 8.4–10.5)
CHLORIDE SERPL-SCNC: 98 MMOL/L — SIGNIFICANT CHANGE UP (ref 98–107)
CO2 SERPL-SCNC: 30 MMOL/L — SIGNIFICANT CHANGE UP (ref 22–31)
COLOR SPEC: YELLOW — SIGNIFICANT CHANGE UP
CREAT SERPL-MCNC: 0.36 MG/DL — LOW (ref 0.5–1.3)
EOSINOPHIL # BLD AUTO: 0.02 K/UL — SIGNIFICANT CHANGE UP (ref 0–0.5)
EOSINOPHIL NFR BLD AUTO: 0.2 % — SIGNIFICANT CHANGE UP (ref 0–6)
GLUCOSE SERPL-MCNC: 108 MG/DL — HIGH (ref 70–99)
GLUCOSE UR-MCNC: NEGATIVE — SIGNIFICANT CHANGE UP
HCT VFR BLD CALC: 31.3 % — LOW (ref 34.5–45)
HGB BLD-MCNC: 9.2 G/DL — LOW (ref 11.5–15.5)
HYALINE CASTS # UR AUTO: HIGH
IMM GRANULOCYTES NFR BLD AUTO: 5.9 % — HIGH (ref 0–1.5)
KETONES UR-MCNC: NEGATIVE — SIGNIFICANT CHANGE UP
LACTATE SERPL-SCNC: 2.4 MMOL/L — HIGH (ref 0.5–2)
LEUKOCYTE ESTERASE UR-ACNC: NEGATIVE — SIGNIFICANT CHANGE UP
LYMPHOCYTES # BLD AUTO: 1.29 K/UL — SIGNIFICANT CHANGE UP (ref 1–3.3)
LYMPHOCYTES # BLD AUTO: 11.6 % — LOW (ref 13–44)
MAGNESIUM SERPL-MCNC: 1.4 MG/DL — LOW (ref 1.6–2.6)
MCHC RBC-ENTMCNC: 27.4 PG — SIGNIFICANT CHANGE UP (ref 27–34)
MCHC RBC-ENTMCNC: 29.4 % — LOW (ref 32–36)
MCV RBC AUTO: 93.2 FL — SIGNIFICANT CHANGE UP (ref 80–100)
MONOCYTES # BLD AUTO: 0.31 K/UL — SIGNIFICANT CHANGE UP (ref 0–0.9)
MONOCYTES NFR BLD AUTO: 2.8 % — SIGNIFICANT CHANGE UP (ref 2–14)
NEUTROPHILS # BLD AUTO: 8.74 K/UL — HIGH (ref 1.8–7.4)
NEUTROPHILS NFR BLD AUTO: 78.7 % — HIGH (ref 43–77)
NITRITE UR-MCNC: NEGATIVE — SIGNIFICANT CHANGE UP
NRBC # FLD: 0 K/UL — LOW (ref 25–125)
PH UR: 6 — SIGNIFICANT CHANGE UP (ref 5–8)
PHOSPHATE SERPL-MCNC: 3 MG/DL — SIGNIFICANT CHANGE UP (ref 2.5–4.5)
PLATELET # BLD AUTO: 210 K/UL — SIGNIFICANT CHANGE UP (ref 150–400)
PMV BLD: 10.3 FL — SIGNIFICANT CHANGE UP (ref 7–13)
POTASSIUM SERPL-MCNC: 3.2 MMOL/L — LOW (ref 3.5–5.3)
POTASSIUM SERPL-SCNC: 3.2 MMOL/L — LOW (ref 3.5–5.3)
PROT SERPL-MCNC: 5 G/DL — LOW (ref 6–8.3)
PROT UR-MCNC: 30 — SIGNIFICANT CHANGE UP
RBC # BLD: 3.36 M/UL — LOW (ref 3.8–5.2)
RBC # FLD: 17.9 % — HIGH (ref 10.3–14.5)
RBC CASTS # UR COMP ASSIST: HIGH (ref 0–?)
SODIUM SERPL-SCNC: 141 MMOL/L — SIGNIFICANT CHANGE UP (ref 135–145)
SP GR SPEC: 1.02 — SIGNIFICANT CHANGE UP (ref 1–1.04)
SPECIMEN SOURCE: SIGNIFICANT CHANGE UP
SPECIMEN SOURCE: SIGNIFICANT CHANGE UP
SQUAMOUS # UR AUTO: SIGNIFICANT CHANGE UP
UROBILINOGEN FLD QL: NORMAL — SIGNIFICANT CHANGE UP
WBC # BLD: 11.11 K/UL — HIGH (ref 3.8–10.5)
WBC # FLD AUTO: 11.11 K/UL — HIGH (ref 3.8–10.5)
WBC UR QL: SIGNIFICANT CHANGE UP (ref 0–?)

## 2019-02-22 PROCEDURE — 99223 1ST HOSP IP/OBS HIGH 75: CPT | Mod: GC

## 2019-02-22 PROCEDURE — 95819 EEG AWAKE AND ASLEEP: CPT | Mod: 26

## 2019-02-22 PROCEDURE — 93010 ELECTROCARDIOGRAM REPORT: CPT

## 2019-02-22 PROCEDURE — 99223 1ST HOSP IP/OBS HIGH 75: CPT

## 2019-02-22 PROCEDURE — 12345: CPT | Mod: NC

## 2019-02-22 RX ORDER — VANCOMYCIN HCL 1 G
1000 VIAL (EA) INTRAVENOUS EVERY 12 HOURS
Qty: 0 | Refills: 0 | Status: DISCONTINUED | OUTPATIENT
Start: 2019-02-22 | End: 2019-02-22

## 2019-02-22 RX ORDER — LEVETIRACETAM 250 MG/1
750 TABLET, FILM COATED ORAL EVERY 12 HOURS
Qty: 0 | Refills: 0 | Status: DISCONTINUED | OUTPATIENT
Start: 2019-02-22 | End: 2019-02-23

## 2019-02-22 RX ORDER — MORPHINE SULFATE 50 MG/1
2.5 CAPSULE, EXTENDED RELEASE ORAL EVERY 4 HOURS
Qty: 0 | Refills: 0 | Status: DISCONTINUED | OUTPATIENT
Start: 2019-02-22 | End: 2019-02-23

## 2019-02-22 RX ORDER — LACOSAMIDE 50 MG/1
100 TABLET ORAL
Qty: 0 | Refills: 0 | Status: DISCONTINUED | OUTPATIENT
Start: 2019-02-22 | End: 2019-02-23

## 2019-02-22 RX ORDER — LEVETIRACETAM 250 MG/1
750 TABLET, FILM COATED ORAL
Qty: 0 | Refills: 0 | Status: DISCONTINUED | OUTPATIENT
Start: 2019-02-22 | End: 2019-02-22

## 2019-02-22 RX ORDER — INFLUENZA VIRUS VACCINE 15; 15; 15; 15 UG/.5ML; UG/.5ML; UG/.5ML; UG/.5ML
0.5 SUSPENSION INTRAMUSCULAR ONCE
Qty: 0 | Refills: 0 | Status: DISCONTINUED | OUTPATIENT
Start: 2019-02-22 | End: 2019-03-02

## 2019-02-22 RX ORDER — SODIUM CHLORIDE 9 MG/ML
1000 INJECTION INTRAMUSCULAR; INTRAVENOUS; SUBCUTANEOUS
Qty: 0 | Refills: 0 | Status: COMPLETED | OUTPATIENT
Start: 2019-02-22 | End: 2019-02-22

## 2019-02-22 RX ORDER — POLYETHYLENE GLYCOL 3350 17 G/17G
17 POWDER, FOR SOLUTION ORAL DAILY
Qty: 0 | Refills: 0 | Status: DISCONTINUED | OUTPATIENT
Start: 2019-02-22 | End: 2019-03-01

## 2019-02-22 RX ORDER — LACOSAMIDE 50 MG/1
200 TABLET ORAL ONCE
Qty: 0 | Refills: 0 | Status: DISCONTINUED | OUTPATIENT
Start: 2019-02-22 | End: 2019-02-22

## 2019-02-22 RX ORDER — ACETAMINOPHEN 500 MG
650 TABLET ORAL EVERY 6 HOURS
Qty: 0 | Refills: 0 | Status: DISCONTINUED | OUTPATIENT
Start: 2019-02-22 | End: 2019-03-01

## 2019-02-22 RX ORDER — DOCUSATE SODIUM 100 MG
1 CAPSULE ORAL
Qty: 0 | Refills: 0 | COMMUNITY

## 2019-02-22 RX ORDER — SENNA PLUS 8.6 MG/1
2 TABLET ORAL AT BEDTIME
Qty: 0 | Refills: 0 | Status: DISCONTINUED | OUTPATIENT
Start: 2019-02-22 | End: 2019-03-01

## 2019-02-22 RX ORDER — ENOXAPARIN SODIUM 100 MG/ML
40 INJECTION SUBCUTANEOUS
Qty: 0 | Refills: 0 | COMMUNITY

## 2019-02-22 RX ORDER — AMLODIPINE BESYLATE 2.5 MG/1
5 TABLET ORAL DAILY
Qty: 0 | Refills: 0 | Status: DISCONTINUED | OUTPATIENT
Start: 2019-02-22 | End: 2019-02-27

## 2019-02-22 RX ORDER — DOCUSATE SODIUM 100 MG
100 CAPSULE ORAL
Qty: 0 | Refills: 0 | Status: DISCONTINUED | OUTPATIENT
Start: 2019-02-22 | End: 2019-03-01

## 2019-02-22 RX ORDER — POTASSIUM CHLORIDE 20 MEQ
10 PACKET (EA) ORAL
Qty: 0 | Refills: 0 | Status: COMPLETED | OUTPATIENT
Start: 2019-02-22 | End: 2019-02-22

## 2019-02-22 RX ORDER — LACTOBACILLUS ACIDOPHILUS 100MM CELL
1 CAPSULE ORAL
Qty: 0 | Refills: 0 | Status: DISCONTINUED | OUTPATIENT
Start: 2019-02-22 | End: 2019-03-01

## 2019-02-22 RX ORDER — LEVETIRACETAM 250 MG/1
500 TABLET, FILM COATED ORAL
Qty: 0 | Refills: 0 | Status: DISCONTINUED | OUTPATIENT
Start: 2019-02-22 | End: 2019-02-22

## 2019-02-22 RX ORDER — PIPERACILLIN AND TAZOBACTAM 4; .5 G/20ML; G/20ML
3.38 INJECTION, POWDER, LYOPHILIZED, FOR SOLUTION INTRAVENOUS EVERY 8 HOURS
Qty: 0 | Refills: 0 | Status: DISCONTINUED | OUTPATIENT
Start: 2019-02-22 | End: 2019-02-23

## 2019-02-22 RX ORDER — ENOXAPARIN SODIUM 100 MG/ML
40 INJECTION SUBCUTANEOUS EVERY 24 HOURS
Qty: 0 | Refills: 0 | Status: DISCONTINUED | OUTPATIENT
Start: 2019-02-22 | End: 2019-02-28

## 2019-02-22 RX ORDER — MAGNESIUM SULFATE 500 MG/ML
2 VIAL (ML) INJECTION ONCE
Qty: 0 | Refills: 0 | Status: COMPLETED | OUTPATIENT
Start: 2019-02-22 | End: 2019-02-22

## 2019-02-22 RX ORDER — FOSPHENYTOIN 50 MG/ML
100 INJECTION INTRAMUSCULAR; INTRAVENOUS THREE TIMES A DAY
Qty: 0 | Refills: 0 | Status: DISCONTINUED | OUTPATIENT
Start: 2019-02-22 | End: 2019-02-22

## 2019-02-22 RX ORDER — FOSPHENYTOIN 50 MG/ML
1160 INJECTION INTRAMUSCULAR; INTRAVENOUS ONCE
Qty: 0 | Refills: 0 | Status: COMPLETED | OUTPATIENT
Start: 2019-02-22 | End: 2019-02-22

## 2019-02-22 RX ORDER — VANCOMYCIN HCL 1 G
750 VIAL (EA) INTRAVENOUS EVERY 12 HOURS
Qty: 0 | Refills: 0 | Status: DISCONTINUED | OUTPATIENT
Start: 2019-02-22 | End: 2019-02-25

## 2019-02-22 RX ORDER — AMLODIPINE BESYLATE 2.5 MG/1
5 TABLET ORAL DAILY
Qty: 0 | Refills: 0 | Status: DISCONTINUED | OUTPATIENT
Start: 2019-02-22 | End: 2019-02-22

## 2019-02-22 RX ADMIN — PIPERACILLIN AND TAZOBACTAM 25 GRAM(S): 4; .5 INJECTION, POWDER, LYOPHILIZED, FOR SOLUTION INTRAVENOUS at 14:49

## 2019-02-22 RX ADMIN — PIPERACILLIN AND TAZOBACTAM 25 GRAM(S): 4; .5 INJECTION, POWDER, LYOPHILIZED, FOR SOLUTION INTRAVENOUS at 05:35

## 2019-02-22 RX ADMIN — Medication 100 MILLIEQUIVALENT(S): at 02:18

## 2019-02-22 RX ADMIN — Medication 1 MILLIGRAM(S): at 17:05

## 2019-02-22 RX ADMIN — Medication 2 MILLIGRAM(S): at 17:20

## 2019-02-22 RX ADMIN — LEVETIRACETAM 400 MILLIGRAM(S): 250 TABLET, FILM COATED ORAL at 17:31

## 2019-02-22 RX ADMIN — Medication 100 MILLIEQUIVALENT(S): at 11:24

## 2019-02-22 RX ADMIN — Medication 100 MILLIEQUIVALENT(S): at 08:20

## 2019-02-22 RX ADMIN — Medication 1 TABLET(S): at 09:59

## 2019-02-22 RX ADMIN — PIPERACILLIN AND TAZOBACTAM 25 GRAM(S): 4; .5 INJECTION, POWDER, LYOPHILIZED, FOR SOLUTION INTRAVENOUS at 21:46

## 2019-02-22 RX ADMIN — Medication 100 MILLIEQUIVALENT(S): at 10:00

## 2019-02-22 RX ADMIN — SODIUM CHLORIDE 100 MILLILITER(S): 9 INJECTION INTRAMUSCULAR; INTRAVENOUS; SUBCUTANEOUS at 14:59

## 2019-02-22 RX ADMIN — Medication 100 MILLIEQUIVALENT(S): at 01:01

## 2019-02-22 RX ADMIN — AMLODIPINE BESYLATE 5 MILLIGRAM(S): 2.5 TABLET ORAL at 07:03

## 2019-02-22 RX ADMIN — ENOXAPARIN SODIUM 40 MILLIGRAM(S): 100 INJECTION SUBCUTANEOUS at 07:02

## 2019-02-22 RX ADMIN — LEVETIRACETAM 500 MILLIGRAM(S): 250 TABLET, FILM COATED ORAL at 07:03

## 2019-02-22 RX ADMIN — Medication 1 MILLIGRAM(S): at 16:48

## 2019-02-22 RX ADMIN — Medication 2 MILLIGRAM(S): at 22:44

## 2019-02-22 RX ADMIN — Medication 250 MILLIGRAM(S): at 22:02

## 2019-02-22 RX ADMIN — Medication 250 MILLIGRAM(S): at 10:38

## 2019-02-22 RX ADMIN — LACOSAMIDE 140 MILLIGRAM(S): 50 TABLET ORAL at 23:20

## 2019-02-22 RX ADMIN — Medication 50 GRAM(S): at 07:03

## 2019-02-22 NOTE — H&P ADULT - PROBLEM SELECTOR PLAN 3
Pt with new seizures in setting of Pt with new seizures in setting of brain mets, now with a seizure in ED. Per daughter, pt may not have been taking full dose of Keppra outpatient as she felt it made her sedated. Received Keppra 1g IV in ED post-seizure.  - C/w home dose keppra 500mg BID  - Monitor for seizure activity  - Neuro consulted, recs appreciated Pt with new seizures in setting of brain mets, now with a seizure in ED. Per daughter, pt may not have been taking full dose of Keppra outpatient as she felt it made her sedated. Received Keppra 1g IV in ED post-seizure.  - C/w home dose keppra 500mg BID  - Monitor for seizure activity  - Neuro consulted, recs appreciated  - F/U lab-work  - IVF hydration

## 2019-02-22 NOTE — PROGRESS NOTE ADULT - PROBLEM SELECTOR PLAN 3
Pt with new seizures in setting of brain mets, now with a seizure in ED. Per daughter, pt may not have been taking full dose of Keppra outpatient as she felt it made her sedated. Received Keppra 1g IV in ED post-seizure.  - C/w home dose keppra 500mg BID  - Monitor for seizure activity  - Neuro consulted, recs appreciated

## 2019-02-22 NOTE — CONSULT NOTE ADULT - SUBJECTIVE AND OBJECTIVE BOX
HPI:  Obtained from H&P   67 yo F with recent diagnosis of metastatic breast cancer (mets to lung, adrenal gland and brain), seizures p/w poor drainage of wound vac. Pt was recently discharged 6 days ago after admission for a fungating breast mass. She was diagnosed with breast cancer with multiple mets to the lung and brain and underwent a palliative mastectomy. Hospital course was c/b a tonic clonic seizure and pt was initiated on Keppra. She was reluctant to make a decision regarding palliative treatment but was made DNR/DNI prior to discharge to rehab with a wound vac.    Pt presents today because of increased drainage from her chest wound and a malfunctioning wound vac over the past few days. She has also had increased drainage from her graft site on her leg. Pt obtunded, unresponsive to verbal stimuli and collateral information obtained from daughter (HCP) at bedside. Per daughter, she was doing well at rehab and denied any fevers, cough, SOB, abd pain, dysuria, increased urination but did endorse less than usual PO intake.    Pt had seizure in ED and received tylenol, Keppra 1g IV x1, ativan IV 2mg x1, 2L LR, vanc, zosyn, KCl 10mg IV x3  Plastic surgery, MICU and neurology consulted (2019 02:40)    PERTINENT PM/SXH:   Do not intubate, perform CPR, or defibrillate  Do not resuscitate  Seizure  Hypertension  Breast cancer  No pertinent past medical history    H/O unilateral modified radical mastectomy, right  No significant past surgical history    FAMILY HISTORY:  Family history of pancreatic cancer  Family history of breast cancer in sister (Sibling)      SOCIAL HISTORY:   Significant other/partner: Yes [ ]  No [x ] Children:  Yes [x ]  No [ ] Restoration/Spirituality: non-Jewish   Substance hx: Yes[ ]  No [x ]   Tobacco hx:  Yes [x ] -former   Alcohol hx: Yes [ ] No [x ]   Home Opioid hx:  Yes [ ] No [x ]    Living Situation: [ ]Home  [ ]Long term care  [x ]Rehab [ ]Other    ADVANCE DIRECTIVES:    DNR  Yes  [facility]  Yes [x ]     [x ] Health Care Proxy(s)  [ ] Surrogate(s)  [ ] Guardian           Name(s): Phone Number(s): Soraya Puente [phone]    BASELINE (I)ADL(s) (prior to admission):  Elliott: [ ]Total  [x ] Moderate [ ]Dependent    Allergies    No Known Allergies    Intolerances    MEDICATIONS  (STANDING):  amLODIPine   Tablet 5 milliGRAM(s) Oral daily  docusate sodium 100 milliGRAM(s) Oral two times a day  enoxaparin Injectable 40 milliGRAM(s) SubCutaneous every 24 hours  influenza   Vaccine 0.5 milliLiter(s) IntraMuscular once  lactobacillus acidophilus 1 Tablet(s) Oral two times a day with meals  levETIRAcetam  Solution 500 milliGRAM(s) Oral two times a day  piperacillin/tazobactam IVPB. 3.375 Gram(s) IV Intermittent every 8 hours  senna 2 Tablet(s) Oral at bedtime  sodium chloride 0.9%. 1000 milliLiter(s) (100 mL/Hr) IV Continuous <Continuous>  vancomycin  IVPB 750 milliGRAM(s) IV Intermittent every 12 hours    MEDICATIONS  (PRN):  acetaminophen   Tablet .. 650 milliGRAM(s) Oral every 6 hours PRN Mild Pain (1 - 3), Moderate Pain (4 - 6)  morphine   Solution 2.5 milliGRAM(s) Oral every 4 hours PRN dyspnea/moderate and severe pain  polyethylene glycol 3350 17 Gram(s) Oral daily PRN Constipation    PRESENT SYMPTOMS: [ ]Unable to obtain due to poor mentation   Source if other than patient:  [ ]Family   [ ]Team     Pain (Impact on QOL):  Denies    PAIN AD Score:     [email or web address]/[email or web address] (press ctrl +  left click to view)    Dyspnea:  Yes [ ] No [x ] - [ ]Mild [ ]Moderate [ ]Severe  Anxiety:    Yes [ ] No [x ] - [ ]Mild [ ]Moderate [ ]Severe  Fatigue:    Yes [x ] No [ ] - [ ]Mild [x ]Moderate [ ]Severe  Nausea:    Yes [ ] No [x ] - [ ]Mild [ ]Moderate [ ]Severe                         Loss of appetite: Yes [x ] No [ ] - [ x]Mild [ ]Moderate [ ]Severe             Constipation:  Yes [ ] No [x ] - [ ]Mild [ ]Moderate [ ]Severe    Other Symptoms:  [x ]All other review of systems negative     Karnofsky Performance Score/Palliative Performance Status Version 2:  40%    http://palliative.info/resource_material/PPSv2.pdf    PHYSICAL EXAM:  Vital Signs Last 24 Hrs  T(C): 37.2 (2019 14:20), Max: 38.4 (2019 16:00)  T(F): 98.9 (2019 14:20), Max: 101.1 (2019 16:00)  HR: 92 (2019 14:20) (79 - 129)  BP: 141/101 (2019 14:20) (124/77 - 196/118)  BP(mean): --  RR: 18 (2019 14:20) (16 - 33)  SpO2: 98% (2019 14:20) (98% - 100%) I&O's Summary    2019 07:01  -  2019 07:00  --------------------------------------------------------  IN: 0 mL / OUT: 520 mL / NET: -520 mL        GENERAL:  Drowsy but easily arousable, answering simple questions and following commands   PULMONARY:   [x ]Clear anteriorly   [ ]Rhonchi        [ ]Right [ ]Left [ ]Bilateral  [ ]Crackles        [ ]Right [ ]Left [ ]Bilateral  [ ]Wheezing     [ ]Right [ ]Left [ ]Bilateral  CARDIOVASCULAR:    [x ]Regular [ ]Irregular [ ]Tachy  [ ]Aly [ ]Murmur [ ]Other  GASTROINTESTINAL:  [x ]Soft  [ ]Distended   [x ]+BS  [x ]Non tender [ ]Tender  [ ]PEG [ ]OGT/ NGT  Last BM: 19    GENITOURINARY:  [ ]Normal [ x] Incontinent   [ ]Oliguria/Anuria   [ ]Waller  MUSCULOSKELETAL:   [ ]Normal   [ x]Weakness  [ ]Bed/Wheelchair bound [ ]Edema  NEUROLOGIC:   [ x]No focal deficits  [ ] Cognitive impairment  [ ] Dysphagia [ ]Dysarthria [ ] Paresis [ ]Other   SKIN:   Right thigh STSG site SHIV - right chest dressing intact     LABS:                        9.2    11.11 )-----------( 210      ( 2019 04:40 )             31.3   02-    141  |  98  |  6<L>  ----------------------------<  108<H>  3.2<L>   |  30  |  0.36<L>    Ca    7.6<L>      2019 04:40  Phos  3.0       Mg     1.4         TPro  5.0<L>  /  Alb  1.8<L>  /  TBili  0.4  /  DBili  x   /  AST  88<H>  /  ALT  13  /  AlkPhos  497<H>    PT/INR - ( 2019 16:24 )   PT: 15.3 SEC;   INR: 1.33          PTT - ( 2019 16:24 )  PTT:23.9 SEC    Urinalysis Basic - ( 2019 04:21 )    Color: YELLOW / Appearance: CLEAR / S.016 / pH: 6.0  Gluc: NEGATIVE / Ketone: NEGATIVE  / Bili: NEGATIVE / Urobili: NORMAL   Blood: NEGATIVE / Protein: 30 / Nitrite: NEGATIVE   Leuk Esterase: NEGATIVE / RBC: 6-10 / WBC 3-5   Sq Epi: FEW / Non Sq Epi: x / Bacteria: FEW      RADIOLOGY & ADDITIONAL STUDIES: Reviewed  CT head  IMPRESSION:  Intracranial metastatic disease is better visualized on prior MRI.    Within limits of a noncontrast CT scan, there has been no significant   interval change since 2019.  Repeat MRI can be performed as   clinically warranted.    PROTEIN CALORIE MALNUTRITION PRESENT: [ ] Yes [ ] No  [ ] PPSV2 < or = to 30% [ ] significant weight loss  [ ] poor nutritional intake [ ] catabolic state [ ] anasarca     Albumin, Serum: 1.8 g/dL (19 @ 04:40)      REFERRALS:   [ ]Chaplaincy  [ ] Hospice  [ ]Child Life  [ ]Social Work  [ ]Case management [ ]Holistic Therapy   Goals of Care Discussion Document: Goals of Care Conversation - Personal Advance Directive [BRYANNA Bear] (19 @ 16:15)

## 2019-02-22 NOTE — CONSULT NOTE ADULT - ASSESSMENT
69 yo F with recent diagnosis of metastatic breast cancer (mets to lung, adrenal gland and brain), seizures presenting meeting SIRS criteria without clear source, febrile in ED and developed seizure.  Palliative consulted for goals of care.

## 2019-02-22 NOTE — H&P ADULT - PROBLEM SELECTOR PLAN 5
Discussed GOC with pt's daughter who is HCP. Confirmed that pt DNR/DNI, copy in chart of [facility]OL[facility]. She endorsed that pt would want to pursue treatment for reversible issues such as infection.  - DNR/DNI - C/w amlodipine - No acute issues presently  - C/W amlodipine

## 2019-02-22 NOTE — PROGRESS NOTE ADULT - PROBLEM SELECTOR PLAN 1
Sepsis of unclear source. Pt febrile, tachycardic, tachypneic with elevated white count in ED. Multiple possible sources including chest wound (plastic surgery thinks unlikely), pneumonia, or UTI. Lactate elevated but likely multifactorial in setting of seizure.  - C/w vanc, zosyn for broad spectrum coverage until definitive source determined  - Send UA and UCx  - F/u BCxs  - IVF overnight. Will likely require more but will reassess in AM.  - Trend lactate

## 2019-02-22 NOTE — H&P ADULT - PROBLEM SELECTOR PLAN 1
Sepsis of unclear source. Pt febrile, tachycardic, tachypneic with elevated white count in ED. Multiple possible sources including chest wound (plastic surgery thinks unlikely), pneumonia, or UTI. Lactate elevated but likely multifactorial in setting of seizure.  - C/w vanc, zosyn for broad spectrum coverage until definitive source determined  - Send UA and UCx  - F/u BCxs  - IVF overnight Sepsis of unclear source. Pt febrile, tachycardic, tachypneic with elevated white count in ED. Multiple possible sources including chest wound (plastic surgery thinks unlikely), pneumonia, or UTI. Lactate elevated but likely multifactorial in setting of seizure.  - C/w vanc, zosyn for broad spectrum coverage until definitive source determined  - Send UA and UCx  - F/u BCxs  - IVF overnight. Will likely require more but will reassess in AM.  - Trend lactate Sepsis of unclear source. Pt febrile, tachycardic, tachypneic with elevated white count in ED. Multiple possible sources including chest wound (plastic surgery thinks unlikely), pneumonia, or UTI. Lactate elevated, but likely multifactorial in setting of seizure.  - C/w vanc, zosyn for broad spectrum coverage until definitive source determined  - Send UA and UCx  - F/u BCxs  - IVF overnight. Will likely require more, but will reassess in AM.  - Trend lactate  - F/U AM lab-work

## 2019-02-22 NOTE — H&P ADULT - PROBLEM SELECTOR PLAN 2
Metastatic breast cancer to lung, adrenal gland and brain, s/p palliative radical mastectomy earlier this month. Pt undecided on course of action for management on last discharge.  - Further GOC with patient when awakes. May warrant onc c/s on this admission if pt amenable to discussing options.

## 2019-02-22 NOTE — CONSULT NOTE ADULT - ATTENDING COMMENTS
Patient examined, chart reviewed and case presented on rounds. On today's exam she had a brief left focal motor seizure involving the arm and leg. No change is LOC. There also is a mild left hemiparesis with may be a post ictal phenomenon.  Would advise increase dose of Keppra to 750 BID and to obtain VEEG monitoring if patient and family are agreeable. For palliative care.
sepsis with elevated lactate  Pt with metastatic breast cancer and brain mets, and seizure disorder   hemodynaically stable, no need for pressors  DNR/DNI
Patient seen and examined.  Meds, labs, vitals all reviewed.  Patient examined by physician.  Agree with NP note.

## 2019-02-22 NOTE — PROGRESS NOTE ADULT - PROBLEM SELECTOR PLAN 6
Discussed GOC with pt's daughter who is HCP. Confirmed that pt DNR/DNI, copy in chart of [facility]. She endorsed that pt would want to pursue treatment for reversible issues such as infection.  - DNR/DNI  - Palliative consult

## 2019-02-22 NOTE — CONSULT NOTE ADULT - PROBLEM SELECTOR RECOMMENDATION 2
No disease modifying treatment at this time s/p palliative mastectomy.  Continue supportive care/wound care.

## 2019-02-22 NOTE — H&P ADULT - NSHPPHYSICALEXAM_GEN_ALL_CORE
Vital Signs Last 24 Hrs  T(C): 36.8 (22 Feb 2019 01:30), Max: 38.4 (21 Feb 2019 16:00)  T(F): 98.2 (22 Feb 2019 01:30), Max: 101.1 (21 Feb 2019 16:00)  HR: 90 (22 Feb 2019 00:55) (90 - 129)  BP: 141/71 (22 Feb 2019 00:55) (124/77 - 196/118)  BP(mean): --  RR: 16 (22 Feb 2019 00:55) (16 - 33)  SpO2: 100% (22 Feb 2019 00:55) (97% - 100%)    Constitutional: Elderly woman seen lying in bed in NAD  Eyes: Sclera clear  ENMT: Dry mucous membranes, clear oropharynx  Neck: Supple, no cervical LAD  Chest: CTAB, no rales, rhonchi or wheezes; Large R sided chest wound, non-erythematous, non-purulent with dressing in place  Cardiovascular: RRR, no m/g/r  Gastrointestinal: +BS, soft, NT/ND  Neurological: AAOx0, unresponsive to verbal or tactile stimuli  Psychiatric: Unresponsive  Extremities: No LE edema; R thigh graft site dry, non-erythematous

## 2019-02-22 NOTE — CHART NOTE - NSCHARTNOTEFT_GEN_A_CORE
Was called by the medicine resident and was told pt had partial seizure like episode  rEEG shows pt is having nonconvulsive seizures.     Please load with Fosphenytoin 20mg/kg   Start maintenance dose Fosphenytoin 100mg TID  Check a fosphenytoin level and albumin level in the AM     Above discussed with Dr. Fenton and Dr. Garnett (Epilepsy fellow and attending) Was called by the medicine resident and was told pt had partial seizure like episode  rEEG shows pt is having nonconvulsive seizures.     Please load with Fosphenytoin 20mg/kg   Start maintenance dose Fosphenytoin 100mg TID  Check a fosphenytoin level and albumin level in the AM     Above discussed with Dr. Fenton and Dr. Garnett (Epilepsy fellow and attending)    *Addendum  Was told that pt is unable to remain on EEG and be transported to a telemetry bed (required for a fosphenytoin load)  Will load with Vimpat 200mg and then 100mg BID standing.   Will coordinate transfer to telemetry in the AM when EEG technician is in house. Was called by the medicine resident and was told pt had partial seizure like episode  rEEG shows pt is having nonconvulsive seizures.     Please load with Fosphenytoin 20mg/kg   Start maintenance dose Fosphenytoin 100mg TID  Check a fosphenytoin level and albumin level in the AM     Above discussed with Dr. Fenton and Dr. Garnett (Epilepsy fellow and attending)    *Addendum  Was told that pt is unable to remain on EEG and be transported to a telemetry bed (required for a fosphenytoin load)  Will load with Vimpat 200mg and then 100mg BID standing.   Will coordinate transfer to telemetry in the AM when EEG technician is in house.  Discussed with Epilepsy Fellow Dr. Fenton

## 2019-02-22 NOTE — CONSULT NOTE ADULT - PROBLEM SELECTOR RECOMMENDATION 9
Patient denies but appears dyspneic at times when speaking.  Morphine 2.5mg PO q4h PRN added.  Patient and daughter aware to request if needed.  O2 via nasal cannula in place.

## 2019-02-22 NOTE — EEG REPORT - NS EEG TEXT BOX
[facility]   COMPREHENSIVE EPILEPSY CENTER   REPORT OF ROUTINE VIDEO EEG     [facility]: [address], [place] [address], Ph#: [phone]  LI: [address], [place] [address], Ph#: [phone]  Office: [address] [place] [address], [facility] [address], [place] [address] Ph#: [phone]    Patient Name: TOMEKA PEREZ  Age and : 68y (52)  MRN #: [id number]  Location: [facility] 9S 962 A  Referring Physician: Anurag Grewal    Study Date: 19    _____________________________________________________________  TECHNICAL INFORMATION    Placement and Labeling of Electrodes:  The EEG was performed utilizing 20 channels referential EEG connections (coronal over temporal over parasagittal montage) using all standard 10-20 electrode placements with EKG.  Recording was at a sampling rate of 256 samples per second per channel.  Time synchronized digital video recording was done simultaneously with EEG recording.  A low light infrared camera was used for low light recording.  Armani and seizure detection algorithms were utilized.    _____________________________________________________________  HISTORY    Patient is a 68y old  Female who presents with a chief complaint of Sepsis (2019 14:43)      PERTINENT MEDICATION:  levETIRAcetam  IVPB 750 milliGRAM(s) IV Intermittent every 12 hours  _____________________________________________________________  STUDY INTERPRETATION    Findings: The background was continuous, spontaneously variable and reactive. Background predominantly consisted of theta and delta activities. No     Focal Slowing:   None was present.    Sleep Background:  Drowsiness was characterized by fragmentation, attenuation, and slowing of the background activity.    Stage II sleep transients were not recorded.    Other Non-Epileptiform Findings:  None were present.    Interictal/Ictal Activity:   Majority of the recording was in interictal ictal continuum (IIC) with upto ~60s of evolving rhythm in the right hemispheric region, (max parasaggital) that started with rhythmic fast activity that evolved to admixture of fast and spikes and then rhythmic theta/delta activities with clinical correlate of LUE and LLE twitching.     Activation Procedures:   Hyperventilation was not performed.    Photic stimulation was not performed.     Artifacts:  Intermittent myogenic and movement artifacts were noted.    ECG:  The heart rate on single channel ECG was predominantly between 75-85 BPM.    _____________________________________________________________  EEG SUMMARY/CLASSIFICATION    Abnormal EEG in an unresponsive patient.  -Majority of the recording was in interictal ictal continuum (IIC) with upto ~60s of evolving rhythm in the right hemispheric region, (max parasaggital) that started with rhythmic fast activity that evolved to admixture of fast and spikes and then rhythmic theta/delta activities with clinical correlate of LUE and LLE twitching.    - Moderate to severe generalized slowing.  _____________________________________________________________  EEG IMPRESSION/CLINICAL CORRELATE    Abnormal EEG study.  1. The recording was in interictal ictal continuum (IIC) in the right hemispheric region, (max parasaggital). Clinical correlate of  LUE and LLE twitching.    2. Moderate to severe nonspecific diffuse or multifocal cerebral dysfunction.         Betzy Fenton MD  Adult EEG Fellow, [facility] Epilepsy [facility]   COMPREHENSIVE EPILEPSY CENTER   REPORT OF ROUTINE VIDEO EEG     [facility]: [address], [place] [address], Ph#: [phone]  LI: [address], [place] [address], Ph#: [phone]  Office: [address] [place] [address], [facility] [address], [place] [address] Ph#: [phone]    Patient Name: TOMEKA PEREZ  Age and : 68y (52)  MRN #: [id number]  Location: [facility] 9S 962 A  Referring Physician: Anurag Grewal    Study Date: 19    _____________________________________________________________  TECHNICAL INFORMATION    Placement and Labeling of Electrodes:  The EEG was performed utilizing 20 channels referential EEG connections (coronal over temporal over parasagittal montage) using all standard 10-20 electrode placements with EKG.  Recording was at a sampling rate of 256 samples per second per channel.  Time synchronized digital video recording was done simultaneously with EEG recording.  A low light infrared camera was used for low light recording.  Armani and seizure detection algorithms were utilized.    _____________________________________________________________  HISTORY    Patient is a 68y old  Female who presents with a chief complaint of Sepsis (2019 14:43)      PERTINENT MEDICATION:  levETIRAcetam  IVPB 750 milliGRAM(s) IV Intermittent every 12 hours  _____________________________________________________________  STUDY INTERPRETATION    Findings: The background was continuous, spontaneously variable and reactive. Background predominantly consisted of theta and delta activities. No     Focal Slowing:   None was present.    Sleep Background:  Drowsiness was characterized by fragmentation, attenuation, and slowing of the background activity.    Stage II sleep transients were not recorded.    Other Non-Epileptiform Findings:  None were present.    Interictal/Ictal Activity:   Majority of the recording was in interictal ictal continuum (IIC) with upto ~60s of evolving rhythm in the right hemispheric region, (max parasaggital) that started with rhythmic fast activity that evolved to admixture of fast and spikes and then rhythmic theta/delta activities with clinical correlate of LUE and LLE twitching.     Activation Procedures:   Hyperventilation was not performed.    Photic stimulation was not performed.     Artifacts:  Intermittent myogenic and movement artifacts were noted.    ECG:  The heart rate on single channel ECG was predominantly between 75-85 BPM.    _____________________________________________________________  EEG SUMMARY/CLASSIFICATION    Abnormal EEG in an unresponsive patient.  -Majority of the recording was in interictal ictal continuum (IIC) with upto ~60s of evolving rhythm in the right hemispheric region, (max parasaggital) that started with rhythmic fast activity that evolved to admixture of fast and spikes and then rhythmic theta/delta activities with clinical correlate of LUE and LLE twitching.    - Moderate to severe generalized slowing.  _____________________________________________________________  EEG IMPRESSION/CLINICAL CORRELATE    Abnormal EEG study.  1. The recording was in interictal ictal continuum (IIC) in the right hemispheric region, (max parasaggital). Clinical correlate of  LUE and LLE twitching.    2. Moderate to severe nonspecific diffuse or multifocal cerebral dysfunction.     Findings discussed with Neurology team.      Betzy Fenton MD  Adult EEG Fellow, [facility] Epilepsy [facility]   COMPREHENSIVE EPILEPSY CENTER   REPORT OF ROUTINE VIDEO EEG     [facility]: [address], [address], [place] [address], Ph#: [phone]  [address]: [address], [place] [address], Ph#: [phone]  Office: [address] [place] [address], [facility] [address], [place] [address] Ph#: [phone]    Patient Name: TOMEKA PEREZ  Age and : 68y (52)  MRN #: [id number]  Location: [facility] 9S 962 A  Referring Physician: Anurag Grewal    Study Date: 19    _____________________________________________________________  TECHNICAL INFORMATION    Placement and Labeling of Electrodes:  The EEG was performed utilizing 20 channels referential EEG connections (coronal over temporal over parasagittal montage) using all standard 10-20 electrode placements with EKG.  Recording was at a sampling rate of 256 samples per second per channel.  Time synchronized digital video recording was done simultaneously with EEG recording.  A low light infrared camera was used for low light recording.  Armani and seizure detection algorithms were utilized.    _____________________________________________________________  HISTORY    Patient is a 68y old  Female who presents with a chief complaint of Sepsis (2019 14:43)      PERTINENT MEDICATION:  levETIRAcetam  IVPB 750 milliGRAM(s) IV Intermittent every 12 hours  _____________________________________________________________  STUDY INTERPRETATION    Findings: The background was continuous, spontaneously variable and reactive. Background predominantly consisted of theta and delta activities. No     Focal Slowing:   None was present.    Sleep Background:  Drowsiness was characterized by fragmentation, attenuation, and slowing of the background activity.    Stage II sleep transients were not recorded.    Other Non-Epileptiform Findings:  None were present.    Interictal/Ictal Activity:   Majority of the recording was in interictal ictal continuum (IIC) with upto ~60s of evolving rhythm in the right hemispheric region, (max parasagittal) that started with rhythmic fast activity that evolved to admixture of fast and spikes and then rhythmic theta/delta activities with clinical correlate of LUE and LLE twitching.     Activation Procedures:   Hyperventilation was not performed.    Photic stimulation was not performed.     Artifacts:  Intermittent myogenic and movement artifacts were noted.    ECG:  The heart rate on single channel ECG was predominantly between 75-85 BPM.    _____________________________________________________________  EEG SUMMARY/CLASSIFICATION    Abnormal EEG in an unresponsive patient.  -Majority of the recording was in interictal ictal continuum (IIC) with upto ~60s of evolving rhythm in the right hemispheric region, (max parasaggital) that started with rhythmic fast activity that evolved to admixture of fast and spikes and then rhythmic theta/delta activities with clinical correlate of LUE and LLE twitching.    - Moderate to severe generalized slowing.  _____________________________________________________________  EEG IMPRESSION/CLINICAL CORRELATE    Abnormal EEG study.  1. The recording was in interictal ictal continuum (IIC) in the right hemispheric region, (max parasaggital). Clinical correlate of  LUE and LLE twitching.    2. Moderate to severe nonspecific diffuse or multifocal cerebral dysfunction.     Findings discussed with Neurology team.      Betzy Fenton MD  Adult EEG Fellow, [facility] Epilepsy [place]    Eloy Garnett MD

## 2019-02-22 NOTE — H&P ADULT - FAMILY HISTORY
Family history of pancreatic cancer     Sibling  Still living? Unknown  Family history of breast cancer in sister, Age at diagnosis: Age Unknown

## 2019-02-22 NOTE — H&P ADULT - PROBLEM SELECTOR PLAN 6
DVT ppx: Lovenox subq Discussed GOC with pt's daughter who is HCP. Confirmed that pt DNR/DNI, copy in chart of MOLST. She endorsed that pt would want to pursue treatment for reversible issues such as infection.  - DNR/DNI

## 2019-02-22 NOTE — H&P ADULT - HISTORY OF PRESENT ILLNESS
69 yo F with recent diagnosis of metastatic breast cancer (mets to lung, adrenal gland and brain), seizures p/w poor drainage of wound vac. Pt was recently discharged 6 days ago after admission for a fungating breast mass. She was diagnosed with breast cancer with multiple mets to the lung and brain and underwent a palliative mastectomy. Hospital course was c/b a tonic clonic seizure and pt was initiated on Keppra. She was reluctant to make a decision regarding palliative treatment but was made DNR/DNI prior to discharge to rehab with a wound vac.    Pt presents today because of increased drainage from her chest wound. Per rehab, pt had lowgrade fever of 99.3 but pt denies fever. 67 yo F with recent diagnosis of metastatic breast cancer (mets to lung, adrenal gland and brain), seizures p/w poor drainage of wound vac. Pt was recently discharged 6 days ago after admission for a fungating breast mass. She was diagnosed with breast cancer with multiple mets to the lung and brain and underwent a palliative mastectomy. Hospital course was c/b a tonic clonic seizure and pt was initiated on Keppra. She was reluctant to make a decision regarding palliative treatment but was made DNR/DNI prior to discharge to rehab with a wound vac.    Pt presents today because of increased drainage from her chest wound and a malfunctioning wound vac over the past few days. She has also had increased drainage from her graft site on her leg. Pt obtunded, unresponsive to verbal stimuli and collateral information obtained from daughter (HCP) at bedside. Per daughter, she was doing well at rehab and denied any fevers, cough, SOB, abd pain, dysuria, increased urination but did endorse less than usual PO intake.    ED:  VS - Tm 101.5, , /119, RR 22, SpO2 98%  Pt had seizure in ED and received tylenol, Keppra 1g IV x1, ativan IV 2mg x1, 2L LR, vanc, zosyn, KCl 10mg IV x3  Plastic surgery, MICU and neurology consulted

## 2019-02-22 NOTE — H&P ADULT - PROBLEM SELECTOR PLAN 4
- C/w amlodipine Hypokalemic on presentation, repleted with IV.  - Trend K+  - Send serum Mg as low Mg can make K+ repletion ineffective Hypokalemic on presentation (3.0), repleted with IV.  - Trend K+  - Send serum Mg as low Mg can make K+ repletion ineffective

## 2019-02-22 NOTE — H&P ADULT - PSH
H/O unilateral modified radical mastectomy, right H/O unilateral modified radical mastectomy, right  ~ palliative, due to fungating mass

## 2019-02-22 NOTE — PROGRESS NOTE ADULT - SUBJECTIVE AND OBJECTIVE BOX
Tommy Tuttle, PGY-1  Internal Medicine  Pager:  [phone]    Patient is a 68y old  Female who presents with a chief complaint of Sepsis (2019 02:40)      SUBJECTIVE / OVERNIGHT EVENTS:   Patient seen and examined at bedside. No acute events overnight.     MEDICATIONS  (STANDING):  amLODIPine   Tablet 5 milliGRAM(s) Oral daily  docusate sodium 100 milliGRAM(s) Oral two times a day  enoxaparin Injectable 40 milliGRAM(s) SubCutaneous every 24 hours  influenza   Vaccine 0.5 milliLiter(s) IntraMuscular once  lactobacillus acidophilus 1 Tablet(s) Oral two times a day with meals  levETIRAcetam  Solution 500 milliGRAM(s) Oral two times a day  piperacillin/tazobactam IVPB. 3.375 Gram(s) IV Intermittent every 8 hours  potassium chloride  10 mEq/100 mL IVPB 10 milliEquivalent(s) IV Intermittent every 1 hour  senna 2 Tablet(s) Oral at bedtime  sodium chloride 0.9%. 1000 milliLiter(s) (100 mL/Hr) IV Continuous <Continuous>  vancomycin  IVPB 750 milliGRAM(s) IV Intermittent every 12 hours    MEDICATIONS  (PRN):  acetaminophen   Tablet .. 650 milliGRAM(s) Oral every 6 hours PRN Mild Pain (1 - 3), Moderate Pain (4 - 6)  polyethylene glycol 3350 17 Gram(s) Oral daily PRN Constipation      CAPILLARY BLOOD GLUCOSE          I&O's Summary    2019 07:01  -  2019 07:00  --------------------------------------------------------  IN: 0 mL / OUT: 520 mL / NET: -520 mL        T(C): 36.8 (19 @ 01:30), Max: 38.4 (19 @ 16:00)  HR: 79 (19 @ 06:56) (79 - 129)  BP: 147/76 (19 @ 06:56) (124/77 - 196/118)  RR: 17 (19 @ 06:56) (16 - 33)  SpO2: 100% (02-22-19 @ 06:56) (97% - 100%)    PHYSICAL EXAM:  Constitutional: Elderly woman seen lying in bed in NAD  Eyes: Sclera clear  ENMT: Dry mucous membranes, clear oropharynx  Neck: Supple, no cervical LAD  Chest: CTAB, no rales, rhonchi or wheezes; Large R sided chest wound, non-erythematous, non-purulent with dressing in place  Cardiovascular: RRR, no m/g/r  Gastrointestinal: +BS, soft, NT/ND  Neurological: AAOx0, unresponsive to verbal or tactile stimuli  Psychiatric: Unresponsive  Extremities: No LE edema; R thigh graft site dry, non-erythematous    LABS:                        9.2    11.11 )-----------( 210      ( 2019 04:40 )             31.3     WBC Trend: 11.11<--, 12.25<--, 16.45<--      141  |  98  |  6<L>  ----------------------------<  108<H>  3.2<L>   |  30  |  0.36<L>    Ca    7.6<L>      2019 04:40  Phos  3.0       Mg     1.4         TPro  5.0<L>  /  Alb  1.8<L>  /  TBili  0.4  /  DBili  x   /  AST  88<H>  /  ALT  13  /  AlkPhos  497<H>      Creatinine Trend: 0.36<--, 0.26<--, 0.27<--, 0.35<--, 0.25<--, 0.21<--  PT/INR - ( 2019 16:24 )   PT: 15.3 SEC;   INR: 1.33          PTT - ( 2019 16:24 )  PTT:23.9 SEC      Urinalysis Basic - ( 2019 04:21 )    Color: YELLOW / Appearance: CLEAR / S.016 / pH: 6.0  Gluc: NEGATIVE / Ketone: NEGATIVE  / Bili: NEGATIVE / Urobili: NORMAL   Blood: NEGATIVE / Protein: 30 / Nitrite: NEGATIVE   Leuk Esterase: NEGATIVE / RBC: 6-10 / WBC 3-5   Sq Epi: FEW / Non Sq Epi: x / Bacteria: FEW        RADIOLOGY & ADDITIONAL TESTS:    Imaging Personally Reviewed:    Consultant(s) Notes Reviewed:      Care Discussed with Consultants/Other Providers:

## 2019-02-22 NOTE — H&P ADULT - PMH
Breast cancer    Hypertension    Seizure Breast cancer  ~ metastatic to brain, lungs, adrenal gland  Do not intubate, perform CPR, or defibrillate    Do not resuscitate    Hypertension    Seizure

## 2019-02-22 NOTE — CONSULT NOTE ADULT - PROBLEM SELECTOR RECOMMENDATION 4
Patient known to palliative - MOLST completed on prior admission.  Goal at this time is for patient to return to rehab as per patient's daughter when she improves medically.  Psychosocial support provided.

## 2019-02-22 NOTE — H&P ADULT - NSHPLABSRESULTS_GEN_ALL_CORE
CBC Full  -  ( 21 Feb 2019 16:24 )  WBC Count : 12.25 K/uL  Hemoglobin : 10.2 g/dL  Hematocrit : 32.0 %  Platelet Count - Automated : 237 K/uL  Mean Cell Volume : 89.4 fL  Mean Cell Hemoglobin : 28.5 pg  Mean Cell Hemoglobin Concentration : 31.9 %  Auto Neutrophil # : 9.29 K/uL  Auto Lymphocyte # : 1.78 K/uL  Auto Monocyte # : 0.46 K/uL  Auto Eosinophil # : 0.01 K/uL  Auto Basophil # : 0.07 K/uL  Auto Neutrophil % : 75.8 %  Auto Lymphocyte % : 14.5 %  Auto Monocyte % : 3.8 %  Auto Eosinophil % : 0.1 %  Auto Basophil % : 0.6 %    02-21    143  |  98  |  4<L>  ----------------------------<  104<H>  3.0<L>   |  29  |  0.26<L>    Ca    8.0<L>      21 Feb 2019 16:24    TPro  5.8<L>  /  Alb  1.8<L>  /  TBili  0.5  /  DBili  x   /  AST  103<H>  /  ALT  12  /  AlkPhos  585<H>  02-21    Blood Gas Venous - Lactate: 3.7: Please note updated reference range. mmol/L (02.21.19 @ 16:24)  Blood Gas Venous - Lactate: 5.1: Please note updated reference range. mmol/L (02.21.19 @ 19:08)    Rapid Respiratory Viral Panel (02.21.19 @ 16:10)    Adenovirus (RapRVP): Not Detected    Influenza A (RapRVP): Not Detected    Influenza AH1 2009 (RapRVP): Not Detected    Influenza AH1 (RapRVP): Not Detected    Influenza AH3 (RapRVP): Not Detected    Influenza B (RapRVP): Not Detected    Parainfluenza 1 (RapRVP): Not Detected    Parainfluenza 2 (RapRVP): Not Detected    Parainfluenza 3 (RapRVP): Not Detected    Parainfluenza 4 (RapRVP): Not Detected    Resp Syncytial Virus (RapRVP): Not Detected    Chlamydia pneumoniae (RapRVP): Not Detected    Mycoplasma pneumoniae (RapRVP): Not Detected    Entero/Rhinovirus (RapRVP): Not Detected    hMPV (RapRVP): Not Detected    Coronavirus (229E,HKU1,NL63,OC43): Not Detected    < from: Xray Chest 1 View- PORTABLE-Urgent (02.21.19 @ 18:56) >      INTERPRETATION:  Large bilateral pulmonary masses are seen which have   increased in size and number from 2/10/2019    < end of copied text >    IMPRESSION:  Intracranial metastatic disease is better visualized on prior MRI.    Within limits of a noncontrast CT scan, there has been no significant   interval change since 02/02/2019.  Repeat MRI can be performed as   clinically warranted.    < end of copied text > CBC Full  -  ( 21 Feb 2019 16:24 )  WBC Count : 12.25 K/uL  Hemoglobin : 10.2 g/dL  Hematocrit : 32.0 %  Platelet Count - Automated : 237 K/uL  Mean Cell Volume : 89.4 fL  Mean Cell Hemoglobin : 28.5 pg  Mean Cell Hemoglobin Concentration : 31.9 %  Auto Neutrophil # : 9.29 K/uL  Auto Lymphocyte # : 1.78 K/uL  Auto Monocyte # : 0.46 K/uL  Auto Eosinophil # : 0.01 K/uL  Auto Basophil # : 0.07 K/uL  Auto Neutrophil % : 75.8 %  Auto Lymphocyte % : 14.5 %  Auto Monocyte % : 3.8 %  Auto Eosinophil % : 0.1 %  Auto Basophil % : 0.6 %    02-21    143  |  98  |  4<L>  ----------------------------<  104<H>  3.0<L>   |  29  |  0.26<L>    Ca    8.0<L>      21 Feb 2019 16:24    TPro  5.8<L>  /  Alb  1.8<L>  /  TBili  0.5  /  DBili  x   /  AST  103<H>  /  ALT  12  /  AlkPhos  585<H>  02-21    Blood Gas Venous - Lactate: 3.7: Please note updated reference range. mmol/L (02.21.19 @ 16:24)  Blood Gas Venous - Lactate: 5.1: Please note updated reference range. mmol/L (02.21.19 @ 19:08)    Rapid Respiratory Viral Panel (02.21.19 @ 16:10)    Adenovirus (RapRVP): Not Detected    Influenza A (RapRVP): Not Detected    Influenza AH1 2009 (RapRVP): Not Detected    Influenza AH1 (RapRVP): Not Detected    Influenza AH3 (RapRVP): Not Detected    Influenza B (RapRVP): Not Detected    Parainfluenza 1 (RapRVP): Not Detected    Parainfluenza 2 (RapRVP): Not Detected    Parainfluenza 3 (RapRVP): Not Detected    Parainfluenza 4 (RapRVP): Not Detected    Resp Syncytial Virus (RapRVP): Not Detected    Chlamydia pneumoniae (RapRVP): Not Detected    Mycoplasma pneumoniae (RapRVP): Not Detected    Entero/Rhinovirus (RapRVP): Not Detected    hMPV (RapRVP): Not Detected    Coronavirus (229E,HKU1,NL63,OC43): Not Detected    < from: Xray Chest 1 View- PORTABLE-Urgent (02.21.19 @ 18:56) >      INTERPRETATION:  Large bilateral pulmonary masses are seen which have   increased in size and number from 2/10/2019    < end of copied text >    IMPRESSION:  Intracranial metastatic disease is better visualized on prior MRI.    Within limits of a noncontrast CT scan, there has been no significant   interval change since 02/02/2019.  Repeat MRI can be performed as   clinically warranted.    < end of copied text >    ==================================================    ECG poor quality with rate ~ 147 bpm

## 2019-02-22 NOTE — H&P ADULT - NSHPSOCIALHISTORY_GEN_ALL_CORE
. Has one daughter (her HCP). Former smoker. Denies ETOH use. Lives alone, coming from rehab. .   Has one daughter (her HCP).   Former smoker.   Denies ETOH use.   Lives alone    Coming in from [facility].

## 2019-02-22 NOTE — PROGRESS NOTE ADULT - PROBLEM SELECTOR PLAN 4
Hypokalemic on presentation, repleted with IV.  - Trend K+  - Send serum Mg as low Mg can make K+ repletion ineffective

## 2019-02-22 NOTE — H&P ADULT - ASSESSMENT
69 yo F with recent diagnosis of metastatic breast cancer (mets to lung, adrenal gland and brain), seizures presenting meeting SIRS criteria without clear source, febrile in ED and developed seizure.

## 2019-02-23 DIAGNOSIS — G40.201 LOCALIZATION-RELATED (FOCAL) (PARTIAL) SYMPTOMATIC EPILEPSY AND EPILEPTIC SYNDROMES WITH COMPLEX PARTIAL SEIZURES, NOT INTRACTABLE, WITH STATUS EPILEPTICUS: ICD-10-CM

## 2019-02-23 PROBLEM — C50.919 MALIGNANT NEOPLASM OF UNSPECIFIED SITE OF UNSPECIFIED FEMALE BREAST: Chronic | Status: ACTIVE | Noted: 2019-02-22

## 2019-02-23 LAB
ALBUMIN SERPL ELPH-MCNC: 1.4 G/DL — LOW (ref 3.3–5)
ALP SERPL-CCNC: 483 U/L — HIGH (ref 40–120)
ALT FLD-CCNC: 10 U/L — SIGNIFICANT CHANGE UP (ref 4–33)
ANION GAP SERPL CALC-SCNC: 14 MMO/L — SIGNIFICANT CHANGE UP (ref 7–14)
AST SERPL-CCNC: 65 U/L — HIGH (ref 4–32)
BILIRUB SERPL-MCNC: 0.4 MG/DL — SIGNIFICANT CHANGE UP (ref 0.2–1.2)
BUN SERPL-MCNC: 4 MG/DL — LOW (ref 7–23)
CALCIUM SERPL-MCNC: 7.2 MG/DL — LOW (ref 8.4–10.5)
CHLORIDE SERPL-SCNC: 98 MMOL/L — SIGNIFICANT CHANGE UP (ref 98–107)
CO2 SERPL-SCNC: 26 MMOL/L — SIGNIFICANT CHANGE UP (ref 22–31)
CREAT SERPL-MCNC: 0.25 MG/DL — LOW (ref 0.5–1.3)
GLUCOSE SERPL-MCNC: 77 MG/DL — SIGNIFICANT CHANGE UP (ref 70–99)
HCT VFR BLD CALC: 27.3 % — LOW (ref 34.5–45)
HGB BLD-MCNC: 8.1 G/DL — LOW (ref 11.5–15.5)
MAGNESIUM SERPL-MCNC: 1.7 MG/DL — SIGNIFICANT CHANGE UP (ref 1.6–2.6)
MCHC RBC-ENTMCNC: 28.9 PG — SIGNIFICANT CHANGE UP (ref 27–34)
MCHC RBC-ENTMCNC: 29.7 % — LOW (ref 32–36)
MCV RBC AUTO: 97.5 FL — SIGNIFICANT CHANGE UP (ref 80–100)
NRBC # FLD: 0.02 K/UL — LOW (ref 25–125)
PHOSPHATE SERPL-MCNC: 2.4 MG/DL — LOW (ref 2.5–4.5)
PLATELET # BLD AUTO: 152 K/UL — SIGNIFICANT CHANGE UP (ref 150–400)
PMV BLD: 10.6 FL — SIGNIFICANT CHANGE UP (ref 7–13)
POTASSIUM SERPL-MCNC: 3.2 MMOL/L — LOW (ref 3.5–5.3)
POTASSIUM SERPL-SCNC: 3.2 MMOL/L — LOW (ref 3.5–5.3)
PROT SERPL-MCNC: 4.4 G/DL — LOW (ref 6–8.3)
RBC # BLD: 2.8 M/UL — LOW (ref 3.8–5.2)
RBC # FLD: 18.3 % — HIGH (ref 10.3–14.5)
SODIUM SERPL-SCNC: 138 MMOL/L — SIGNIFICANT CHANGE UP (ref 135–145)
SPECIMEN SOURCE: SIGNIFICANT CHANGE UP
VANCOMYCIN TROUGH SERPL-MCNC: 9.1 UG/ML — LOW (ref 10–20)
WBC # BLD: 10.45 K/UL — SIGNIFICANT CHANGE UP (ref 3.8–10.5)
WBC # FLD AUTO: 10.45 K/UL — SIGNIFICANT CHANGE UP (ref 3.8–10.5)

## 2019-02-23 PROCEDURE — 99233 SBSQ HOSP IP/OBS HIGH 50: CPT

## 2019-02-23 PROCEDURE — 95951: CPT | Mod: 26

## 2019-02-23 RX ORDER — LEVETIRACETAM 250 MG/1
500 TABLET, FILM COATED ORAL ONCE
Qty: 0 | Refills: 0 | Status: DISCONTINUED | OUTPATIENT
Start: 2019-02-23 | End: 2019-02-23

## 2019-02-23 RX ORDER — LACOSAMIDE 50 MG/1
200 TABLET ORAL EVERY 12 HOURS
Qty: 0 | Refills: 0 | Status: DISCONTINUED | OUTPATIENT
Start: 2019-02-23 | End: 2019-02-23

## 2019-02-23 RX ORDER — LEVETIRACETAM 250 MG/1
1500 TABLET, FILM COATED ORAL EVERY 12 HOURS
Qty: 0 | Refills: 0 | Status: DISCONTINUED | OUTPATIENT
Start: 2019-02-23 | End: 2019-02-24

## 2019-02-23 RX ORDER — MORPHINE SULFATE 50 MG/1
1 CAPSULE, EXTENDED RELEASE ORAL ONCE
Qty: 0 | Refills: 0 | Status: DISCONTINUED | OUTPATIENT
Start: 2019-02-23 | End: 2019-02-23

## 2019-02-23 RX ORDER — LEVETIRACETAM 250 MG/1
500 TABLET, FILM COATED ORAL ONCE
Qty: 0 | Refills: 0 | Status: COMPLETED | OUTPATIENT
Start: 2019-02-23 | End: 2019-02-23

## 2019-02-23 RX ORDER — POTASSIUM CHLORIDE 20 MEQ
10 PACKET (EA) ORAL
Qty: 0 | Refills: 0 | Status: COMPLETED | OUTPATIENT
Start: 2019-02-23 | End: 2019-02-23

## 2019-02-23 RX ORDER — LACOSAMIDE 50 MG/1
100 TABLET ORAL EVERY 12 HOURS
Qty: 0 | Refills: 0 | Status: DISCONTINUED | OUTPATIENT
Start: 2019-02-23 | End: 2019-02-23

## 2019-02-23 RX ORDER — ACETAMINOPHEN 500 MG
325 TABLET ORAL ONCE
Qty: 0 | Refills: 0 | Status: COMPLETED | OUTPATIENT
Start: 2019-02-23 | End: 2019-02-23

## 2019-02-23 RX ADMIN — MORPHINE SULFATE 1 MILLIGRAM(S): 50 CAPSULE, EXTENDED RELEASE ORAL at 11:02

## 2019-02-23 RX ADMIN — PIPERACILLIN AND TAZOBACTAM 25 GRAM(S): 4; .5 INJECTION, POWDER, LYOPHILIZED, FOR SOLUTION INTRAVENOUS at 05:40

## 2019-02-23 RX ADMIN — Medication 2 MILLIGRAM(S): at 03:58

## 2019-02-23 RX ADMIN — ENOXAPARIN SODIUM 40 MILLIGRAM(S): 100 INJECTION SUBCUTANEOUS at 07:09

## 2019-02-23 RX ADMIN — Medication 2 MILLIGRAM(S): at 07:49

## 2019-02-23 RX ADMIN — LEVETIRACETAM 400 MILLIGRAM(S): 250 TABLET, FILM COATED ORAL at 05:40

## 2019-02-23 RX ADMIN — Medication 250 MILLIGRAM(S): at 11:01

## 2019-02-23 RX ADMIN — Medication 325 MILLIGRAM(S): at 23:50

## 2019-02-23 RX ADMIN — LACOSAMIDE 120 MILLIGRAM(S): 50 TABLET ORAL at 11:05

## 2019-02-23 RX ADMIN — Medication 100 MILLIEQUIVALENT(S): at 09:31

## 2019-02-23 RX ADMIN — LEVETIRACETAM 400 MILLIGRAM(S): 250 TABLET, FILM COATED ORAL at 14:10

## 2019-02-23 RX ADMIN — Medication 100 MILLIEQUIVALENT(S): at 10:59

## 2019-02-23 RX ADMIN — Medication 250 MILLIGRAM(S): at 22:02

## 2019-02-23 NOTE — PROGRESS NOTE ADULT - PROBLEM SELECTOR PLAN 2
Metastatic breast cancer to lung, adrenal gland and brain, s/p palliative radical mastectomy earlier this month. Pt undecided on course of action for management on last discharge.  - Further GOC with patient when awakes. May warrant onc c/s on this admission if pt amenable to discussing options. Sepsis of unclear source. Pt febrile, tachycardic, tachypneic with elevated white count in ED. Multiple possible sources including chest wound (plastic surgery thinks unlikely), pneumonia, or UTI. Lactate elevated but likely multifactorial in setting of seizure.  - C/w vanc, zosyn for broad spectrum coverage until definitive source determined  - UCx + E. coli  - F/u BCxs  - Switch zosyn to CTX

## 2019-02-23 NOTE — EEG REPORT - NS EEG TEXT BOX
[facility]   COMPREHENSIVE EPILEPSY CENTER   REPORT OF CONTINUOUS VIDEO EEG     [facility]: [address], [place] [address], Ph#: [phone]  [facility]:  [address], [place] [address], Ph#: [phone]  Office: [address] [place] [address], [place] [address], [place] [address] Ph#: [phone]    Patient Name: TOMEKA PEREZ  Age and : 68y (52)  MRN #: [id number]  Location: [facility] 9S 962 A  Referring Physician: Anurag Grewal    Study Date: 19    _____________________________________________________________  STUDY INFORMATION    EEG Recording Technique:  The patient underwent continuous Video-EEG monitoring, using Telemetry System hardware on the XLTek Digital System. EEG and video data were stored on a computer hard drive with important events saved in digital archive files. The material was reviewed by a physician (electroencephalographer / epileptologist) on a daily basis. Armani and seizure detection algorithms were utilized and reviewed. An EEG Technician attended to the patient, and was available throughout daytime work hours.  The epilepsy center neurologist was available in person or on call 24-hours per day.    EEG Placement and Labeling of Electrodes:  The EEG was performed utilizing 20 channel referential EEG connections (coronal over temporal over parasagittal montage) using all standard 10-20 electrode placements with EKG, with additional electrodes placed in the inferior temporal region using the modified 10-10 montage electrode placements for elective admissions, or if deemed necessary. Recording was at a sampling rate of 256 samples per second per channel. Time synchronized digital video recording was done simultaneously with EEG recording. A low light infrared camera was used for low light recording.     _____________________________________________________________  HISTORY    Patient is a 68y old  Female who presents with a chief complaint of Sepsis (2019 14:43)      PERTINENT MEDICATION:  lacosamide IVPB 100 milliGRAM(s) IV Intermittent every 12 hours  levETIRAcetam  IVPB 750 milliGRAM(s) IV Intermittent every 12 hours  levETIRAcetam  Solution 500 milliGRAM(s) Oral once    _____________________________________________________________  STUDY INTERPRETATION    Findings: The background was continuous, spontaneously variable and reactive. Background predominantly consisted of theta and delta activities. No posterior dominant rhythm was seen.     Focal Slowing:   None was present.    Sleep Background:  Drowsiness was characterized by fragmentation, attenuation, and slowing of the background activity.    Rudimentary sleep spindles were noted.     Other Non-Epileptiform Findings:  None were present.    Interictal/Ictal Activity:   Majority of the recording was in interictal ictal continuum (IIC) with upto ~60s of evolving rhythm in the right hemispheric region, (max parasagittal) that started with rhythmic fast activity that evolved to admixture of fast and spikes and then rhythmic theta/delta activities with clinical correlate of LUE and LLE twitching.     Activation Procedures:   Hyperventilation was not performed.    Photic stimulation was not performed.     Artifacts:  Intermittent myogenic and movement artifacts were noted.    ECG:  The heart rate on single channel ECG was predominantly between 75-85 BPM.    _____________________________________________________________  EEG SUMMARY/CLASSIFICATION    Abnormal EEG in an unresponsive patient.  -Majority of the recording was in interictal ictal continuum (IIC) with upto ~60s of evolving rhythm in the right hemispheric region, (max parasaggital) that started with rhythmic fast activity that evolved to admixture of fast and spikes and then rhythmic theta/delta activities with clinical correlate of LUE and LLE twitching.    - Moderate to severe generalized slowing.  _____________________________________________________________  EEG IMPRESSION/CLINICAL CORRELATE    Abnormal EEG study.  1. The recording was in interictal ictal continuum (IIC) in the right hemispheric region, (max parasaggital). Clinical correlate of  LUE and LLE twitching.    2. Moderate to severe nonspecific diffuse or multifocal cerebral dysfunction.     ***PRELIM FELLOW READ*** INCOMPLETE NOTE    Jessie Michaels MD  Epilepsy Fellow    Eloy Garnett MD [facility]   COMPREHENSIVE EPILEPSY CENTER   REPORT OF CONTINUOUS VIDEO EEG     [facility]: [address] [place] [address], [place] [address], Ph#: [phone]  [facility]:  [address], [place] [address], Ph#: [phone]  Office: [address] [place] [address], [place] [address], [place] [address] Ph#: [phone]    Patient Name: TOMEKA PEREZ  Age and : 68y (52)  MRN #: [id number]  Location: [facility] 9S 962 A  Referring Physician: Anurag Grewal    Study Date: 19    _____________________________________________________________  STUDY INFORMATION    EEG Recording Technique:  The patient underwent continuous Video-EEG monitoring, using Telemetry System hardware on the XLTek Digital System. EEG and video data were stored on a computer hard drive with important events saved in digital archive files. The material was reviewed by a physician (electroencephalographer / epileptologist) on a daily basis. Armani and seizure detection algorithms were utilized and reviewed. An EEG Technician attended to the patient, and was available throughout daytime work hours.  The epilepsy center neurologist was available in person or on call 24-hours per day.    EEG Placement and Labeling of Electrodes:  The EEG was performed utilizing 20 channel referential EEG connections (coronal over temporal over parasagittal montage) using all standard 10-20 electrode placements with EKG, with additional electrodes placed in the inferior temporal region using the modified 10-10 montage electrode placements for elective admissions, or if deemed necessary. Recording was at a sampling rate of 256 samples per second per channel. Time synchronized digital video recording was done simultaneously with EEG recording. A low light infrared camera was used for low light recording.     _____________________________________________________________  HISTORY    Patient is a 68y old  Female who presents with a chief complaint of Sepsis (2019 14:43)      PERTINENT MEDICATION:  lacosamide IVPB 100 milliGRAM(s) IV Intermittent every 12 hours  levETIRAcetam  IVPB 750 milliGRAM(s) IV Intermittent every 12 hours  levETIRAcetam  Solution 500 milliGRAM(s) Oral once    _____________________________________________________________  STUDY INTERPRETATION    Findings: The background was continuous, spontaneously variable and reactive. Background predominantly consisted of theta and delta activities. No posterior dominant rhythm was seen.     Focal Slowing:   None was present.    Sleep Background:  Drowsiness was characterized by fragmentation, attenuation, and slowing of the background activity.    Rudimentary sleep spindles were noted.     Other Non-Epileptiform Findings:  None were present.    Interictal/Ictal Activity:   Majority of the early recording was in interictal ictal continuum (IIC) with upto ~60s of evolving rhythm in the right hemispheric region, (max parasagittal) that started with rhythmic fast activity that evolved to admixture of fast and spikes and then rhythmic theta/delta activities with clinical correlate of LUE and LLE twitching.     After 00:02, during sleep, there were shorter runs of rhythmic fast activity seen with increased presence of parasagittal (right>left) broadly distributed LPDs with a frequency of 1-2Hz with decreased movements.  However, with arrousal, the IIC was present once again.    Activation Procedures:   Hyperventilation was not performed.    Photic stimulation was not performed.     Artifacts:  Intermittent myogenic and movement artifacts were noted.    ECG:  The heart rate on single channel ECG was predominantly between 75-85 BPM.    _____________________________________________________________  EEG SUMMARY/CLASSIFICATION    Abnormal EEG in an unresponsive patient.  -Majority of the recording was in interictal ictal continuum (IIC) with upto ~60s of evolving rhythm in the right hemispheric region, (max parasaggital) that started with rhythmic fast activity that evolved to admixture of fast and spikes and then rhythmic theta/delta activities with clinical correlate of LUE and LLE twitching.    - Moderate to severe generalized slowing.  _____________________________________________________________  EEG IMPRESSION/CLINICAL CORRELATE    Abnormal EEG study.  1. The recording was in interictal ictal continuum (IIC) in the right hemispheric region, (max parasaggital). Clinical correlate of  LUE and LLE twitching.  Decreased during sleep.    2. Moderate to severe nonspecific diffuse or multifocal cerebral dysfunction.     Jessie Michaels MD  Epilepsy Fellow    Eloy Garnett MD

## 2019-02-23 NOTE — CHART NOTE - NSCHARTNOTEFT_GEN_A_CORE
Patient having intermittent focal seizures as shown on vEEG that correlates to head and left shoulder twitching clinically. Patient protecting airway and responds to commands (opens eyes when asked to). Patient was given ativan 2 mg IV x1. Per Neurology recommendations, patient was started on a Vimpat load of 200 mg IV x1 over 30 minutes. EKG showed normal CO interval.    After receiving Vimpat load, patient continued to have focal seizures with left head and shoulder twitching.     Vitals: T 99.2 HR 92 /85 RR 18 SPO2 97% on 2.5 L NC   Patient pupils equal and reactive to light. Opened eyes on command. Protecting airway.    As patient is still having focal seizures >3 minutes after receiving vimpat load, will administer ativan 2 mg IV push x1 and reassess.       Bina Mondragon  Internal Medicine  PGY-1

## 2019-02-23 NOTE — PROGRESS NOTE ADULT - PROBLEM SELECTOR PLAN 1
Sepsis of unclear source. Pt febrile, tachycardic, tachypneic with elevated white count in ED. Multiple possible sources including chest wound (plastic surgery thinks unlikely), pneumonia, or UTI. Lactate elevated but likely multifactorial in setting of seizure.  - C/w vanc, zosyn for broad spectrum coverage until definitive source determined  - Send UA and UCx  - F/u BCxs  - IVF overnight. Will likely require more but will reassess in AM.  - Trend lactate -Neurology following, EEG confirmed focal complex status epilepticus  - Increased keppra to 1500mg BID, added vimpat 200mg BID  - Refaractory to ativan  - D/c zosyn , switch to CTX  -Confirmed GOC with daughter mercedes, DNR/DNI leaning more towards comfort care, dilaudid PRN

## 2019-02-23 NOTE — PROGRESS NOTE ADULT - SUBJECTIVE AND OBJECTIVE BOX
Patient is a 68y old  Female who presents with a chief complaint of Sepsis (2019 14:43)        SUBJECTIVE / OVERNIGHT EVENTS: Status epilepticus overnight.       MEDICATIONS  (STANDING):  amLODIPine   Tablet 5 milliGRAM(s) Oral daily  docusate sodium 100 milliGRAM(s) Oral two times a day  enoxaparin Injectable 40 milliGRAM(s) SubCutaneous every 24 hours  influenza   Vaccine 0.5 milliLiter(s) IntraMuscular once  lacosamide IVPB 100 milliGRAM(s) IV Intermittent every 12 hours  lactobacillus acidophilus 1 Tablet(s) Oral two times a day with meals  levETIRAcetam  IVPB 750 milliGRAM(s) IV Intermittent every 12 hours  levETIRAcetam  IVPB 500 milliGRAM(s) IV Intermittent once  senna 2 Tablet(s) Oral at bedtime  vancomycin  IVPB 750 milliGRAM(s) IV Intermittent every 12 hours    MEDICATIONS  (PRN):  acetaminophen   Tablet .. 650 milliGRAM(s) Oral every 6 hours PRN Mild Pain (1 - 3), Moderate Pain (4 - 6)  LORazepam   Injectable 2 milliGRAM(s) IV Push every 1 hour PRN seizure  polyethylene glycol 3350 17 Gram(s) Oral daily PRN Constipation        CAPILLARY BLOOD GLUCOSE        I&O's Summary      PHYSICAL EXAM  GENERAL: NAD, well-developed  HEAD:  Atraumatic, Normocephalic  EYES: EOMI, PERRLA, conjunctiva and sclera clear  NECK: Supple, No JVD  CHEST/LUNG: Clear to auscultation bilaterally; No wheeze  HEART: Regular rate and rhythm; No murmurs, rubs, or gallops  ABDOMEN: Soft, Nontender, Nondistended; Bowel sounds present  EXTREMITIES:  2+ Peripheral Pulses, No clubbing, cyanosis, or edema  PSYCH: AAOx3  SKIN: No rashes or lesions    LABS:                        8.1    10.45 )-----------( 152      ( 2019 05:27 )             27.3     02-    138  |  98  |  4<L>  ----------------------------<  77  3.2<L>   |  26  |  0.25<L>    Ca    7.2<L>      2019 05:27  Phos  2.4     -  Mg     1.7         TPro  4.4<L>  /  Alb  1.4<L>  /  TBili  0.4  /  DBili  x   /  AST  65<H>  /  ALT  10  /  AlkPhos  483<H>      PT/INR - ( 2019 16:24 )   PT: 15.3 SEC;   INR: 1.33          PTT - ( 2019 16:24 )  PTT:23.9 SEC      Urinalysis Basic - ( 2019 04:21 )    Color: YELLOW / Appearance: CLEAR / S.016 / pH: 6.0  Gluc: NEGATIVE / Ketone: NEGATIVE  / Bili: NEGATIVE / Urobili: NORMAL   Blood: NEGATIVE / Protein: 30 / Nitrite: NEGATIVE   Leuk Esterase: NEGATIVE / RBC: 6-10 / WBC 3-5   Sq Epi: FEW / Non Sq Epi: x / Bacteria: FEW        RADIOLOGY & ADDITIONAL TESTS:    Imaging Personally Reviewed:  Consultant(s) Notes Reviewed:    Care Discussed with Consultants/Other Providers:

## 2019-02-23 NOTE — CHART NOTE - NSCHARTNOTEFT_GEN_A_CORE
Called patient's daughter Ms. Delgado who arrived in the afternoon. Had an extensive discussion with her and other [employer] members at presence. Explained to her that Ms. Edwin Puente (patient) has been in status epilepticus despite aggressive AED regimen as well as ativan. Relayed that this adds to the pre-existing poor prognosis, that her mental status hereon will be uncertain and unlikely to return to her baseline. Per patient's wishes, she is not to be given any opiates of any kind unless she relays/shows/communicates any signs of discomfort. Daughter chose to discontinue vimpat but not keppra which will be discussed with neurology first. If clinical status remains unimproved, family will ultimately focus on full comfort care measure. Given patient's lack of mental status and ongoing GOC with daughter and , will need to call palliative care for further evaluation. Would asses for (inpatient) hospice evaluation.   Guilherme Parker MD  P: [phone]

## 2019-02-24 LAB — VANCOMYCIN TROUGH SERPL-MCNC: 18.7 UG/ML — SIGNIFICANT CHANGE UP (ref 10–20)

## 2019-02-24 PROCEDURE — 99233 SBSQ HOSP IP/OBS HIGH 50: CPT

## 2019-02-24 PROCEDURE — 95951: CPT | Mod: 26

## 2019-02-24 PROCEDURE — 71045 X-RAY EXAM CHEST 1 VIEW: CPT | Mod: 26

## 2019-02-24 RX ORDER — LEVETIRACETAM 250 MG/1
1000 TABLET, FILM COATED ORAL EVERY 12 HOURS
Qty: 0 | Refills: 0 | Status: DISCONTINUED | OUTPATIENT
Start: 2019-02-24 | End: 2019-03-02

## 2019-02-24 RX ORDER — ACETAMINOPHEN 500 MG
650 TABLET ORAL ONCE
Qty: 0 | Refills: 0 | Status: COMPLETED | OUTPATIENT
Start: 2019-02-24 | End: 2019-02-24

## 2019-02-24 RX ADMIN — Medication 650 MILLIGRAM(S): at 10:04

## 2019-02-24 RX ADMIN — Medication 250 MILLIGRAM(S): at 10:42

## 2019-02-24 RX ADMIN — Medication 325 MILLIGRAM(S): at 01:45

## 2019-02-24 RX ADMIN — Medication 650 MILLIGRAM(S): at 10:50

## 2019-02-24 RX ADMIN — LEVETIRACETAM 400 MILLIGRAM(S): 250 TABLET, FILM COATED ORAL at 17:43

## 2019-02-24 RX ADMIN — Medication 250 MILLIGRAM(S): at 22:18

## 2019-02-24 RX ADMIN — Medication 650 MILLIGRAM(S): at 08:24

## 2019-02-24 RX ADMIN — Medication 650 MILLIGRAM(S): at 06:43

## 2019-02-24 RX ADMIN — LEVETIRACETAM 400 MILLIGRAM(S): 250 TABLET, FILM COATED ORAL at 05:59

## 2019-02-24 RX ADMIN — ENOXAPARIN SODIUM 40 MILLIGRAM(S): 100 INJECTION SUBCUTANEOUS at 06:42

## 2019-02-24 NOTE — PROGRESS NOTE ADULT - PROBLEM SELECTOR PLAN 2
Sepsis of unclear source. Pt febrile, tachycardic, tachypneic with elevated white count in ED. Multiple possible sources including chest wound (plastic surgery thinks unlikely), pneumonia, or UTI. Lactate elevated but likely multifactorial in setting of seizure.  - C/w vanc  - UCx + E. coli  - F/u BCxs

## 2019-02-24 NOTE — EEG REPORT - NS EEG TEXT BOX
[facility]   COMPREHENSIVE EPILEPSY CENTER   REPORT OF CONTINUOUS VIDEO EEG     [facility]: [address], [place] [address], Ph#: [phone]  [facility]:  [address], [place] [address], Ph#: [phone]  Office: [address] [place] [address], [place] [address], [place] [address] Ph#: [phone]    Patient Name: TOMEKA PEREZ  Age and : 68y (52)  MRN #: [id number]  Location: [facility] 9S 962 A  Referring Physician: Anurag Grewal    Study Date: 2019    _____________________________________________________________  STUDY INFORMATION    EEG Recording Technique:  The patient underwent continuous Video-EEG monitoring, using Telemetry System hardware on the XLTek Digital System. EEG and video data were stored on a computer hard drive with important events saved in digital archive files. The material was reviewed by a physician (electroencephalographer / epileptologist) on a daily basis. Armani and seizure detection algorithms were utilized and reviewed. An EEG Technician attended to the patient, and was available throughout daytime work hours.  The epilepsy center neurologist was available in person or on call 24-hours per day.    EEG Placement and Labeling of Electrodes:  The EEG was performed utilizing 20 channel referential EEG connections (coronal over temporal over parasagittal montage) using all standard 10-20 electrode placements with EKG, with additional electrodes placed in the inferior temporal region using the modified 10-10 montage electrode placements for elective admissions, or if deemed necessary. Recording was at a sampling rate of 256 samples per second per channel. Time synchronized digital video recording was done simultaneously with EEG recording. A low light infrared camera was used for low light recording.     _____________________________________________________________  HISTORY    Patient is a 68y old  Female who presents with a chief complaint of Sepsis (2019 14:43)      PERTINENT MEDICATION:  lacosamide IVPB 100 milliGRAM(s) IV Intermittent every 12 hours  levETIRAcetam  IVPB 750 milliGRAM(s) IV Intermittent every 12 hours  levETIRAcetam  Solution 500 milliGRAM(s) Oral once    _____________________________________________________________  STUDY INTERPRETATION    Findings: The background was continuous, spontaneously variable and reactive. Background predominantly consisted of theta and delta activities. No posterior dominant rhythm was seen.     Focal Slowing:   None was present.    Sleep Background:  Drowsiness was characterized by fragmentation, attenuation, and slowing of the background activity.    Rudimentary sleep spindles were noted.     Other Non-Epileptiform Findings:  None were present.    Interictal/Ictal Activity:   During the majority of the recording there were broadly distributed lateralized periodic discharges in the parasagittal (right>left) regions, with a frequency of 1-2Hz. Throughout the recording the patient continued to have brief jerking movements of head and ULE.     Activation Procedures:   Hyperventilation was not performed.    Photic stimulation was not performed.     Artifacts:  Intermittent myogenic and movement artifacts were noted.    ECG:  The heart rate on single channel ECG was predominantly between 65-75 BPM, appearing irregular.     _____________________________________________________________  EEG SUMMARY/CLASSIFICATION    Abnormal EEG in an unresponsive patient.  -During the majority of the recording there were broadly distributed lateralized periodic discharges in the parasagittal (right>left) regions, with a frequency of 1-2Hz. Throughout the recording the patient continued to have brief jerking movements of head and ULE.   - Moderate to severe generalized slowing.  _____________________________________________________________  EEG IMPRESSION/CLINICAL CORRELATE    Abnormal EEG study.  1. The EEG is suggestive of an epileptogenic potential from the right>left parasagittal regions. Clinically patient was noted to have LUE and head twitching.   2. Moderate to severe nonspecific diffuse or multifocal cerebral dysfunction.     ***PRELIM FELLOW READ*** INCOMPLETE NOTE    Jessie Michaels MD  Epilepsy Fellow    Eloy Garnett MD [facility]   COMPREHENSIVE EPILEPSY CENTER   REPORT OF CONTINUOUS VIDEO EEG     [facility]: [address], [place] [address], Ph#: [phone]  [facility]:  [address], [place] [address], Ph#: [phone]  Office: [address] [place] [address], [place] [address], [place] [address] Ph#: [phone]    Patient Name: TOMEKA PEREZ  Age and : 68y (52)  MRN #: [id number]  Location: [facility] 9S 962 A  Referring Physician: Anurag Grewal    Study Date: 2019    _____________________________________________________________  STUDY INFORMATION    EEG Recording Technique:  The patient underwent continuous Video-EEG monitoring, using Telemetry System hardware on the XLTek Digital System. EEG and video data were stored on a computer hard drive with important events saved in digital archive files. The material was reviewed by a physician (electroencephalographer / epileptologist) on a daily basis. Armani and seizure detection algorithms were utilized and reviewed. An EEG Technician attended to the patient, and was available throughout daytime work hours.  The epilepsy center neurologist was available in person or on call 24-hours per day.    EEG Placement and Labeling of Electrodes:  The EEG was performed utilizing 20 channel referential EEG connections (coronal over temporal over parasagittal montage) using all standard 10-20 electrode placements with EKG, with additional electrodes placed in the inferior temporal region using the modified 10-10 montage electrode placements for elective admissions, or if deemed necessary. Recording was at a sampling rate of 256 samples per second per channel. Time synchronized digital video recording was done simultaneously with EEG recording. A low light infrared camera was used for low light recording.     _____________________________________________________________  HISTORY    Patient is a 68y old  Female who presents with a chief complaint of Sepsis (2019 14:43)      PERTINENT MEDICATION:  lacosamide IVPB 100 milliGRAM(s) IV Intermittent every 12 hours  levETIRAcetam  IVPB 750 milliGRAM(s) IV Intermittent every 12 hours  levETIRAcetam  Solution 500 milliGRAM(s) Oral once    _____________________________________________________________  STUDY INTERPRETATION    Findings: The background was continuous, spontaneously variable and reactive. Background predominantly consisted of theta and delta activities. No posterior dominant rhythm was seen.     Focal Slowing:   None was present.    Sleep Background:  Drowsiness was characterized by fragmentation, attenuation, and slowing of the background activity.    Rudimentary sleep spindles were noted.     Other Non-Epileptiform Findings:  None were present.    Interictal/Ictal Activity:   During the majority of the recording there were broadly distributed lateralized periodic discharges in the parasagittal (right>left) regions, with a frequency of 1-2Hz. Throughout the recording the patient continued to have brief jerking movements of head and ULE.     Activation Procedures:   Hyperventilation was not performed.    Photic stimulation was not performed.     Artifacts:  Intermittent myogenic and movement artifacts were noted.    ECG:  The heart rate on single channel ECG was predominantly between 65-75 BPM, appearing irregular.     _____________________________________________________________  EEG SUMMARY/CLASSIFICATION    Abnormal EEG in an unresponsive patient.  -During the majority of the recording there were broadly distributed lateralized periodic discharges in the parasagittal (right>left) regions, with a frequency of 1-2Hz. Throughout the recording the patient continued to have brief jerking movements of head and ULE.   - Moderate to severe generalized slowing.  _____________________________________________________________  EEG IMPRESSION/CLINICAL CORRELATE    Abnormal EEG study.  1. The EEG is suggestive of an epileptogenic potential from the right>left parasagittal regions. Clinically patient was noted to have LUE and head twitching.   2. Moderate to severe nonspecific diffuse or multifocal cerebral dysfunction.     Jessie Michaels MD  Epilepsy Fellow    Eloy Garnett MD

## 2019-02-24 NOTE — PROGRESS NOTE ADULT - PROBLEM SELECTOR PLAN 1
-Neurology following, EEG confirmed focal complex status epilepticus  - c/w keppra 1500mg BID  - d/c vimpat per family request  - Refaractory to ativan  - c/w vancomycin  -Confirmed GOC with daughter mercedes, DNR/DNI leaning more towards comfort care, dilaudid PRN

## 2019-02-24 NOTE — PROGRESS NOTE ADULT - SUBJECTIVE AND OBJECTIVE BOX
Neurology Follow up note      Subjective: Interval History - Patient continues to be in focal status epilepticus.     Objective:   Vital Signs Last 24 Hrs  T(C): 38.4 (24 Feb 2019 09:17), Max: 38.4 (24 Feb 2019 09:17)  T(F): 101.1 (24 Feb 2019 09:17), Max: 101.1 (24 Feb 2019 09:17)  HR: 104 (24 Feb 2019 05:51) (89 - 104)  BP: 131/72 (24 Feb 2019 05:51) (131/72 - 166/91)  BP(mean): --  RR: 22 (24 Feb 2019 05:51) (22 - 24)  SpO2: 97% (24 Feb 2019 05:51) (97% - 100%)    General Exam:   Eyes closed  Rhythmic jerking activity of the left side and head  Grimaces to pain    02-23    138  |  98  |  4<L>  ----------------------------<  77  3.2<L>   |  26  |  0.25<L>    Ca    7.2<L>      23 Feb 2019 05:27  Phos  2.4     02-23  Mg     1.7     02-23    TPro  4.4<L>  /  Alb  1.4<L>  /  TBili  0.4  /  DBili  x   /  AST  65<H>  /  ALT  10  /  AlkPhos  483<H>  02-23 02-23    138  |  98  |  4<L>  ----------------------------<  77  3.2<L>   |  26  |  0.25<L>    Ca    7.2<L>      23 Feb 2019 05:27  Phos  2.4     02-23  Mg     1.7     02-23    TPro  4.4<L>  /  Alb  1.4<L>  /  TBili  0.4  /  DBili  x   /  AST  65<H>  /  ALT  10  /  AlkPhos  483<H>  02-23    LIVER FUNCTIONS - ( 23 Feb 2019 05:27 )  Alb: 1.4 g/dL / Pro: 4.4 g/dL / ALK PHOS: 483 u/L / ALT: 10 u/L / AST: 65 u/L / GGT: x                                 8.1    10.45 )-----------( 152      ( 23 Feb 2019 05:27 )             27.3     Radiology    EEG SUMMARY/CLASSIFICATION    Abnormal EEG in an unresponsive patient.  -Majority of the recording was in interictal ictal continuum (IIC) with upto ~60s of evolving rhythm in the right hemispheric region, (max parasaggital) that started with rhythmic fast activity that evolved to admixture of fast and spikes and then rhythmic theta/delta activities with clinical correlate of LUE and LLE twitching.    - Moderate to severe generalized slowing.  _____________________________________________________________  EEG IMPRESSION/CLINICAL CORRELATE    Abnormal EEG study.  1. The recording was in interictal ictal continuum (IIC) in the right hemispheric region, (max parasaggital). Clinical correlate of  LUE and LLE twitching.  Decreased during sleep.    2. Moderate to severe nonspecific diffuse or multifocal cerebral dysfunction.       MEDICATIONS  (STANDING):  amLODIPine   Tablet 5 milliGRAM(s) Oral daily  docusate sodium 100 milliGRAM(s) Oral two times a day  enoxaparin Injectable 40 milliGRAM(s) SubCutaneous every 24 hours  influenza   Vaccine 0.5 milliLiter(s) IntraMuscular once  lactobacillus acidophilus 1 Tablet(s) Oral two times a day with meals  levETIRAcetam  IVPB 1500 milliGRAM(s) IV Intermittent every 12 hours  senna 2 Tablet(s) Oral at bedtime  vancomycin  IVPB 750 milliGRAM(s) IV Intermittent every 12 hours    MEDICATIONS  (PRN):  acetaminophen   Tablet .. 650 milliGRAM(s) Oral every 6 hours PRN Mild Pain (1 - 3), Moderate Pain (4 - 6)  LORazepam   Injectable 2 milliGRAM(s) IV Push every 1 hour PRN seizure  polyethylene glycol 3350 17 Gram(s) Oral daily PRN Constipation

## 2019-02-24 NOTE — PROGRESS NOTE ADULT - SUBJECTIVE AND OBJECTIVE BOX
Tommy Tuttle, PGY-1  Internal Medicine  Pager:  [phone]    Patient is a 68y old  Female who presents with a chief complaint of Sepsis (23 Feb 2019 12:40)      SUBJECTIVE / OVERNIGHT EVENTS:   Patient seen and examined at bedside. Patient febrile overnight requiring tylenol.    MEDICATIONS  (STANDING):  amLODIPine   Tablet 5 milliGRAM(s) Oral daily  docusate sodium 100 milliGRAM(s) Oral two times a day  enoxaparin Injectable 40 milliGRAM(s) SubCutaneous every 24 hours  influenza   Vaccine 0.5 milliLiter(s) IntraMuscular once  lactobacillus acidophilus 1 Tablet(s) Oral two times a day with meals  levETIRAcetam  IVPB 1500 milliGRAM(s) IV Intermittent every 12 hours  senna 2 Tablet(s) Oral at bedtime  vancomycin  IVPB 750 milliGRAM(s) IV Intermittent every 12 hours    MEDICATIONS  (PRN):  acetaminophen   Tablet .. 650 milliGRAM(s) Oral every 6 hours PRN Mild Pain (1 - 3), Moderate Pain (4 - 6)  LORazepam   Injectable 2 milliGRAM(s) IV Push every 1 hour PRN seizure  polyethylene glycol 3350 17 Gram(s) Oral daily PRN Constipation      CAPILLARY BLOOD GLUCOSE          I&O's Summary      T(C): 38.3 (02-24-19 @ 05:51), Max: 38.3 (02-24-19 @ 05:51)  HR: 104 (02-24-19 @ 05:51) (89 - 104)  BP: 131/72 (02-24-19 @ 05:51) (131/72 - 166/91)  RR: 22 (02-24-19 @ 05:51) (22 - 24)  SpO2: 97% (02-24-19 @ 05:51) (97% - 100%)    PHYSICAL EXAM:  GENERAL: NAD, well-developed  HEAD:  Atraumatic, Normocephalic  EYES: PERRL. EOMI,  conjunctiva and sclera clear.  NECK: Supple  CHEST/LUNG: Clear to auscultation bilaterally. Large R sided chest wound, non-erythematous, non-purulent with dressing in place  HEART: Regular rate and rhythm. Normal sounding S1, S2. No murmurs, rubs, or gallops  ABDOMEN: Soft, nontender, nondistended; Bowel sounds present.  EXTREMITIES:  No clubbing, cyanosis, or edema. Peripheral pulses 2+ and symmetric. Right thigh graft site dry, non-erythematous  PSYCH: AAOx2-3  NEUROLOGY: left sided focal seizures  SKIN: No rashes or lesions    LABS:                        8.1    10.45 )-----------( 152      ( 23 Feb 2019 05:27 )             27.3     WBC Trend: 10.45<--, 11.11<--, 12.25<--  02-23    138  |  98  |  4<L>  ----------------------------<  77  3.2<L>   |  26  |  0.25<L>    Ca    7.2<L>      23 Feb 2019 05:27  Phos  2.4     02-23  Mg     1.7     02-23    TPro  4.4<L>  /  Alb  1.4<L>  /  TBili  0.4  /  DBili  x   /  AST  65<H>  /  ALT  10  /  AlkPhos  483<H>  02-23    Creatinine Trend: 0.25<--, 0.36<--, 0.26<--, 0.27<--, 0.35<--, 0.25<--            RADIOLOGY & ADDITIONAL TESTS:    Imaging Personally Reviewed:    Consultant(s) Notes Reviewed:  Neurology    Care Discussed with Consultants/Other Providers: Tommy Tuttle, PGY-1  Internal Medicine  Pager:  [phone]    Patient is a 68y old  Female who presents with a chief complaint of Sepsis (23 Feb 2019 12:40)      SUBJECTIVE / OVERNIGHT EVENTS:   Patient seen and examined at bedside. Patient febrile overnight requiring tylenol. Continuing to seize.    MEDICATIONS  (STANDING):  amLODIPine   Tablet 5 milliGRAM(s) Oral daily  docusate sodium 100 milliGRAM(s) Oral two times a day  enoxaparin Injectable 40 milliGRAM(s) SubCutaneous every 24 hours  influenza   Vaccine 0.5 milliLiter(s) IntraMuscular once  lactobacillus acidophilus 1 Tablet(s) Oral two times a day with meals  levETIRAcetam  IVPB 1500 milliGRAM(s) IV Intermittent every 12 hours  senna 2 Tablet(s) Oral at bedtime  vancomycin  IVPB 750 milliGRAM(s) IV Intermittent every 12 hours    MEDICATIONS  (PRN):  acetaminophen   Tablet .. 650 milliGRAM(s) Oral every 6 hours PRN Mild Pain (1 - 3), Moderate Pain (4 - 6)  LORazepam   Injectable 2 milliGRAM(s) IV Push every 1 hour PRN seizure  polyethylene glycol 3350 17 Gram(s) Oral daily PRN Constipation      CAPILLARY BLOOD GLUCOSE          I&O's Summary      T(C): 38.3 (02-24-19 @ 05:51), Max: 38.3 (02-24-19 @ 05:51)  HR: 104 (02-24-19 @ 05:51) (89 - 104)  BP: 131/72 (02-24-19 @ 05:51) (131/72 - 166/91)  RR: 22 (02-24-19 @ 05:51) (22 - 24)  SpO2: 97% (02-24-19 @ 05:51) (97% - 100%)    PHYSICAL EXAM:  GENERAL: NAD, well-developed  HEAD:  Atraumatic, Normocephalic  EYES: PERRL. EOMI,  conjunctiva and sclera clear.  NECK: Supple  CHEST/LUNG: Clear to auscultation bilaterally. Large R sided chest wound, non-erythematous, non-purulent with dressing in place  HEART: Regular rate and rhythm. Normal sounding S1, S2. No murmurs, rubs, or gallops  ABDOMEN: Soft, nontender, nondistended; Bowel sounds present.  EXTREMITIES:  No clubbing, cyanosis, or edema. Peripheral pulses 2+ and symmetric. Right thigh graft site dry, non-erythematous  PSYCH: Only responding to pain  NEUROLOGY: left sided focal seizures  SKIN: No rashes or lesions    LABS:                        8.1    10.45 )-----------( 152      ( 23 Feb 2019 05:27 )             27.3     WBC Trend: 10.45<--, 11.11<--, 12.25<--  02-23    138  |  98  |  4<L>  ----------------------------<  77  3.2<L>   |  26  |  0.25<L>    Ca    7.2<L>      23 Feb 2019 05:27  Phos  2.4     02-23  Mg     1.7     02-23    TPro  4.4<L>  /  Alb  1.4<L>  /  TBili  0.4  /  DBili  x   /  AST  65<H>  /  ALT  10  /  AlkPhos  483<H>  02-23    Creatinine Trend: 0.25<--, 0.36<--, 0.26<--, 0.27<--, 0.35<--, 0.25<--            RADIOLOGY & ADDITIONAL TESTS:    Imaging Personally Reviewed:    Consultant(s) Notes Reviewed:  Neurology    Care Discussed with Consultants/Other Providers:

## 2019-02-25 DIAGNOSIS — R53.2 FUNCTIONAL QUADRIPLEGIA: ICD-10-CM

## 2019-02-25 DIAGNOSIS — R56.9 UNSPECIFIED CONVULSIONS: ICD-10-CM

## 2019-02-25 LAB
-  AMPICILLIN: SIGNIFICANT CHANGE UP
-  CIPROFLOXACIN: SIGNIFICANT CHANGE UP
-  NITROFURANTOIN: SIGNIFICANT CHANGE UP
-  TETRACYCLINE: SIGNIFICANT CHANGE UP
-  VANCOMYCIN: SIGNIFICANT CHANGE UP
ALBUMIN SERPL ELPH-MCNC: 1.5 G/DL — LOW (ref 3.3–5)
ALP SERPL-CCNC: 456 U/L — HIGH (ref 40–120)
ALT FLD-CCNC: 10 U/L — SIGNIFICANT CHANGE UP (ref 4–33)
ANION GAP SERPL CALC-SCNC: 14 MMO/L — SIGNIFICANT CHANGE UP (ref 7–14)
AST SERPL-CCNC: 67 U/L — HIGH (ref 4–32)
BACTERIA UR CULT: SIGNIFICANT CHANGE UP
BILIRUB SERPL-MCNC: 0.5 MG/DL — SIGNIFICANT CHANGE UP (ref 0.2–1.2)
BUN SERPL-MCNC: 10 MG/DL — SIGNIFICANT CHANGE UP (ref 7–23)
CALCIUM SERPL-MCNC: 7.8 MG/DL — LOW (ref 8.4–10.5)
CHLORIDE SERPL-SCNC: 100 MMOL/L — SIGNIFICANT CHANGE UP (ref 98–107)
CO2 SERPL-SCNC: 28 MMOL/L — SIGNIFICANT CHANGE UP (ref 22–31)
CREAT SERPL-MCNC: 1.02 MG/DL — SIGNIFICANT CHANGE UP (ref 0.5–1.3)
GLUCOSE SERPL-MCNC: 61 MG/DL — LOW (ref 70–99)
HCT VFR BLD CALC: 23.8 % — LOW (ref 34.5–45)
HGB BLD-MCNC: 7.4 G/DL — LOW (ref 11.5–15.5)
MAGNESIUM SERPL-MCNC: 1.7 MG/DL — SIGNIFICANT CHANGE UP (ref 1.6–2.6)
MCHC RBC-ENTMCNC: 28 PG — SIGNIFICANT CHANGE UP (ref 27–34)
MCHC RBC-ENTMCNC: 31.1 % — LOW (ref 32–36)
MCV RBC AUTO: 90.2 FL — SIGNIFICANT CHANGE UP (ref 80–100)
METHOD TYPE: SIGNIFICANT CHANGE UP
NRBC # FLD: 0.02 K/UL — LOW (ref 25–125)
ORGANISM # SPEC MICROSCOPIC CNT: SIGNIFICANT CHANGE UP
PHOSPHATE SERPL-MCNC: 3.1 MG/DL — SIGNIFICANT CHANGE UP (ref 2.5–4.5)
PLATELET # BLD AUTO: 240 K/UL — SIGNIFICANT CHANGE UP (ref 150–400)
PMV BLD: 10.7 FL — SIGNIFICANT CHANGE UP (ref 7–13)
POTASSIUM SERPL-MCNC: 3.6 MMOL/L — SIGNIFICANT CHANGE UP (ref 3.5–5.3)
POTASSIUM SERPL-SCNC: 3.6 MMOL/L — SIGNIFICANT CHANGE UP (ref 3.5–5.3)
PROT SERPL-MCNC: 4.4 G/DL — LOW (ref 6–8.3)
RBC # BLD: 2.64 M/UL — LOW (ref 3.8–5.2)
RBC # FLD: 17.9 % — HIGH (ref 10.3–14.5)
SODIUM SERPL-SCNC: 142 MMOL/L — SIGNIFICANT CHANGE UP (ref 135–145)
WBC # BLD: 12.3 K/UL — HIGH (ref 3.8–10.5)
WBC # FLD AUTO: 12.3 K/UL — HIGH (ref 3.8–10.5)

## 2019-02-25 PROCEDURE — 95951: CPT | Mod: 26,52

## 2019-02-25 PROCEDURE — 99223 1ST HOSP IP/OBS HIGH 75: CPT

## 2019-02-25 PROCEDURE — 99233 SBSQ HOSP IP/OBS HIGH 50: CPT | Mod: GC

## 2019-02-25 RX ORDER — LINEZOLID 600 MG/300ML
600 INJECTION, SOLUTION INTRAVENOUS EVERY 12 HOURS
Qty: 0 | Refills: 0 | Status: COMPLETED | OUTPATIENT
Start: 2019-02-25 | End: 2019-03-01

## 2019-02-25 RX ORDER — CEFTRIAXONE 500 MG/1
1 INJECTION, POWDER, FOR SOLUTION INTRAMUSCULAR; INTRAVENOUS EVERY 24 HOURS
Qty: 0 | Refills: 0 | Status: DISCONTINUED | OUTPATIENT
Start: 2019-02-25 | End: 2019-02-25

## 2019-02-25 RX ORDER — SODIUM CHLORIDE 9 MG/ML
1000 INJECTION, SOLUTION INTRAVENOUS
Qty: 0 | Refills: 0 | Status: DISCONTINUED | OUTPATIENT
Start: 2019-02-25 | End: 2019-03-02

## 2019-02-25 RX ADMIN — ENOXAPARIN SODIUM 40 MILLIGRAM(S): 100 INJECTION SUBCUTANEOUS at 06:06

## 2019-02-25 RX ADMIN — LEVETIRACETAM 400 MILLIGRAM(S): 250 TABLET, FILM COATED ORAL at 18:02

## 2019-02-25 RX ADMIN — SODIUM CHLORIDE 75 MILLILITER(S): 9 INJECTION, SOLUTION INTRAVENOUS at 22:02

## 2019-02-25 RX ADMIN — CEFTRIAXONE 100 GRAM(S): 500 INJECTION, POWDER, FOR SOLUTION INTRAMUSCULAR; INTRAVENOUS at 08:55

## 2019-02-25 RX ADMIN — LEVETIRACETAM 400 MILLIGRAM(S): 250 TABLET, FILM COATED ORAL at 06:02

## 2019-02-25 RX ADMIN — Medication 1 MILLIGRAM(S): at 22:02

## 2019-02-25 RX ADMIN — Medication 250 MILLIGRAM(S): at 10:39

## 2019-02-25 RX ADMIN — SODIUM CHLORIDE 75 MILLILITER(S): 9 INJECTION, SOLUTION INTRAVENOUS at 14:47

## 2019-02-25 RX ADMIN — LINEZOLID 300 MILLIGRAM(S): 600 INJECTION, SOLUTION INTRAVENOUS at 19:47

## 2019-02-25 NOTE — EEG REPORT - NS EEG TEXT BOX
[facility]   COMPREHENSIVE EPILEPSY CENTER   REPORT OF CONTINUOUS VIDEO EEG     [facility]: [address], [place] [address], Ph#: [phone]  [facility]:  [address] [place] [address], Ph#: [phone]  Office: [address] [place] [address], [facility] [address], [place] [address] Ph#: [phone]    Patient Name: TOMEKA PEREZ  Age and : 68y (52)  MRN #: [id number]  Location: [facility] 9S 962 [address]  Referring Physician: Anurag Grewal    Study Date: 2019 (08:30) - 19 (13:47)    _____________________________________________________________  STUDY INFORMATION    EEG Recording Technique:  The patient underwent continuous Video-EEG monitoring, using Telemetry System hardware on the XLTek Digital System. EEG and video data were stored on a computer hard drive with important events saved in digital archive files. The material was reviewed by a physician (electroencephalographer / epileptologist) on a daily basis. [employer] and seizure detection algorithms were utilized and reviewed. An EEG Technician attended to the patient, and was available throughout daytime work hours.  The epilepsy center neurologist was available in person or on call 24-hours per day.    EEG Placement and Labeling of Electrodes:  The EEG was performed utilizing 20 channel referential EEG connections (coronal over temporal over parasagittal montage) using all standard 10-20 electrode placements with EKG, with additional electrodes placed in the inferior temporal region using the modified 10-10 montage electrode placements for elective admissions, or if deemed necessary. Recording was at a sampling rate of 256 samples per second per channel. Time synchronized digital video recording was done simultaneously with EEG recording. A low light infrared camera was used for low light recording.     _____________________________________________________________  HISTORY  Patient is a 68y old  Female who presents with a chief complaint of status epilepticus in setting of multiple brain mets (primary breast).     PERTINENT MEDICATION:  levETIRAcetam  IVPB 1000 milliGRAM(s) IV Intermittent every 12 hours  _____________________________________________________________  STUDY INTERPRETATION    Findings: The background was continuous, spontaneously variable and reactive. Background predominantly consisted of theta and delta activities. No posterior dominant rhythm was seen.     Focal Slowing:   None was present.    Sleep Background:  Drowsiness was characterized by fragmentation, attenuation, and slowing of the background activity. Sleep elements were not seen.    Other Non-Epileptiform Findings:  None were present.    Epileptiform Activity:   During the entire recording there were broadly distributed lateralized periodic discharges in the right parasagittal region (max C4/P4) with field to left parasagittal region, at a frequency of 0.5-2Hz suspicious for electroclinical seizures. Clinically, the patient continued to have brief jerking movements of head to the left with ULE clonic jerks.     Activation Procedures:   Hyperventilation was not performed.    Photic stimulation was not performed.     Artifacts:  Intermittent myogenic and movement artifacts were noted.    ECG:  The heart rate on single channel ECG was predominantly between 65-90 BPM, appearing irregular.     _____________________________________________________________  EEG SUMMARY/CLASSIFICATION    Abnormal EEG in an unresponsive patient.  -During the entire recording there were broadly distributed lateralized periodic discharges in the right parasagittal region (max C4/P4) with field to left parasagittal region, at a frequency of 0.5-2Hz suspicious for electroclinical seizures. Clinically, the patient continued to have brief jerking movements of head to the left with ULE clonic jerks.   - Moderate to severe generalized slowing.  _____________________________________________________________  EEG IMPRESSION/CLINICAL CORRELATE    Abnormal EEG study.  1. Focal status epilepticus.  2. Moderate to severe nonspecific diffuse or multifocal cerebral dysfunction.     Jessie Michaels MD  Epilepsy Fellow    Rolo Jo MD  Epilepsy Attending [facility]   COMPREHENSIVE EPILEPSY CENTER   REPORT OF CONTINUOUS VIDEO EEG     [facility]: [address], [place] [address], Ph#: [phone]  [facility]:  [address] [place] [address], Ph#: [phone]  Office: [address] [place] [address], [facility] [address], [place] [address] Ph#: [phone]    Patient Name: TOMEKA PEREZ  Age and : 68y (52)  MRN #: [id number]  Location: [facility] 9S 962 A  Referring Physician: Anurag Grewal    Study Date: 2019 (08:30) - 19 (13:47)    _____________________________________________________________  STUDY INFORMATION    EEG Recording Technique:  The patient underwent continuous Video-EEG monitoring, using Telemetry System hardware on the XLTek Digital System. EEG and video data were stored on a computer hard drive with important events saved in digital archive files. The material was reviewed by a physician (electroencephalographer / epileptologist) on a daily basis. Armani and seizure detection algorithms were utilized and reviewed. An EEG Technician attended to the patient, and was available throughout daytime work hours.  The epilepsy center neurologist was available in person or on call 24-hours per day.    EEG Placement and Labeling of Electrodes:  The EEG was performed utilizing 20 channel referential EEG connections (coronal over temporal over parasagittal montage) using all standard 10-20 electrode placements with EKG, with additional electrodes placed in the inferior temporal region using the modified 10-10 montage electrode placements for elective admissions, or if deemed necessary. Recording was at a sampling rate of 256 samples per second per channel. Time synchronized digital video recording was done simultaneously with EEG recording. A low light infrared camera was used for low light recording.     _____________________________________________________________  HISTORY  Patient is a 68y old  Female who presents with a chief complaint of status epilepticus in setting of multiple brain mets (primary breast).     PERTINENT MEDICATION:  levETIRAcetam  IVPB 1000 milliGRAM(s) IV Intermittent every 12 hours  _____________________________________________________________  STUDY INTERPRETATION    Findings: The background was continuous, spontaneously variable and reactive. Background predominantly consisted of theta and delta activities. No posterior dominant rhythm was seen.     Focal Slowing:   None was present.    Sleep Background:  Drowsiness was characterized by fragmentation, attenuation, and slowing of the background activity. Sleep elements were not seen.    Other Non-Epileptiform Findings:  None were present.    Epileptiform Activity:   During the entire recording there were broadly distributed lateralized periodic discharges in the right parasagittal region (max C4/P4) with field to left parasagittal region, at a frequency of 0.5-2Hz with time locked jerking of head to the left with LUE clonus (focal motor seizure/epilepsia partialis continua)    Activation Procedures:   Hyperventilation was not performed.    Photic stimulation was not performed.     Artifacts:  Intermittent myogenic and movement artifacts were noted.    ECG:  The heart rate on single channel ECG was predominantly between 65-90 BPM, appearing irregular.     _____________________________________________________________  EEG SUMMARY/CLASSIFICATION    Abnormal EEG in an unresponsive patient.  -During the entire recording there were broadly distributed lateralized periodic discharges in the right parasagittal region (max C4/P4) with field to left parasagittal region, at a frequency of 0.5-2Hz with time locked jerking of head to the left with LUE clonus (focal motor seizure/epilepsia partialis continua)  - Moderate to severe generalized slowing.  _____________________________________________________________  EEG IMPRESSION/CLINICAL CORRELATE    Abnormal EEG study.  1. Focal motor status epilepticus/EPC.  2. Moderate to severe nonspecific diffuse or multifocal cerebral dysfunction.     Jessie Michaels MD  Epilepsy Fellow    Rolo Jo MD  Epilepsy Attending

## 2019-02-25 NOTE — PROGRESS NOTE ADULT - PROBLEM SELECTOR PLAN 2
Sepsis of unclear source. Pt febrile, tachycardic, tachypneic with elevated white count in ED. Multiple possible sources including chest wound (plastic surgery thinks unlikely), pneumonia, or UTI. Lactate elevated but likely multifactorial in setting of seizure.  - C/w vanc  - Start ceftriaxone  - UCx + E. coli  - F/u BCxs Sepsis of unclear source. Pt febrile, tachycardic, tachypneic with elevated white count in ED. Multiple possible sources including chest wound (plastic surgery thinks unlikely), pneumonia, or UTI. Lactate elevated but likely multifactorial in setting of seizure.  - C/w Vanc  - Start ceftriaxone  - UCx + Enteroccus faecium   - F/u BCxs

## 2019-02-25 NOTE — PROGRESS NOTE ADULT - PROBLEM SELECTOR PLAN 1
-Neurology following, EEG confirmed focal complex status epilepticus  - lower keppra dose per family request to 1000 BID  - d/c vimpat per family request  - Refaractory to ativan  - c/w vancomycin  -Confirmed GOC with daughter mercedes, DNR/DNI leaning more towards comfort care, dilaudid PRN -Neurology following, EEG confirmed focal complex status epilepticus  - lower keppra dose per family request to 1000 BID  - d/c vimpat per family request  - Refractory to ativan  -Confirmed GOC with daughter mercedes, DNR/DNI leaning more towards comfort care, dilaudid PRN

## 2019-02-25 NOTE — PROGRESS NOTE ADULT - SUBJECTIVE AND OBJECTIVE BOX
Tommy Tuttle, PGY-1  Internal Medicine  Pager:  [phone]    Patient is a 68y old  Female who presents with a chief complaint of Sepsis (24 Feb 2019 11:38)      SUBJECTIVE / OVERNIGHT EVENTS:   Patient seen and examined at bedside. No acute events overnight.     MEDICATIONS  (STANDING):  amLODIPine   Tablet 5 milliGRAM(s) Oral daily  docusate sodium 100 milliGRAM(s) Oral two times a day  enoxaparin Injectable 40 milliGRAM(s) SubCutaneous every 24 hours  influenza   Vaccine 0.5 milliLiter(s) IntraMuscular once  lactobacillus acidophilus 1 Tablet(s) Oral two times a day with meals  levETIRAcetam  IVPB 1000 milliGRAM(s) IV Intermittent every 12 hours  senna 2 Tablet(s) Oral at bedtime  vancomycin  IVPB 750 milliGRAM(s) IV Intermittent every 12 hours    MEDICATIONS  (PRN):  acetaminophen   Tablet .. 650 milliGRAM(s) Oral every 6 hours PRN Mild Pain (1 - 3), Moderate Pain (4 - 6)  LORazepam   Injectable 2 milliGRAM(s) IV Push every 1 hour PRN seizure  polyethylene glycol 3350 17 Gram(s) Oral daily PRN Constipation      CAPILLARY BLOOD GLUCOSE          I&O's Summary      T(C): 36.8 (02-25-19 @ 05:55), Max: 38.4 (02-24-19 @ 09:17)  HR: 91 (02-25-19 @ 05:55) (91 - 98)  BP: 151/89 (02-25-19 @ 05:55) (133/76 - 151/89)  RR: 21 (02-25-19 @ 05:55) (21 - 23)  SpO2: 100% (02-25-19 @ 05:55) (96% - 100%)    PHYSICAL EXAM:  GENERAL: NAD, well-developed  HEAD:  Atraumatic, Normocephalic  EYES: PERRL. EOMI,  conjunctiva and sclera clear.  NECK: Supple  CHEST/LUNG: Clear to auscultation bilaterally. Large R sided chest wound, non-erythematous, non-purulent with dressing in place  HEART: Regular rate and rhythm. Normal sounding S1, S2. No murmurs, rubs, or gallops  ABDOMEN: Soft, nontender, nondistended; Bowel sounds present.  EXTREMITIES:  No clubbing, cyanosis, or edema. Peripheral pulses 2+ and symmetric. Right thigh graft site dry, non-erythematous  PSYCH: Only responding to pain  NEUROLOGY: left sided focal seizures  SKIN: No rashes or lesions    LABS:    WBC Trend: 10.45<--, 11.11<--, 12.25<--  02-25    142  |  100  |  10  ----------------------------<  61<L>  3.6   |  28  |  1.02    Ca    7.8<L>      25 Feb 2019 06:20  Phos  3.1     02-25  Mg     1.7     02-25    TPro  4.4<L>  /  Alb  1.5<L>  /  TBili  0.5  /  DBili  x   /  AST  67<H>  /  ALT  10  /  AlkPhos  456<H>  02-25    Creatinine Trend: 1.02<--, 0.25<--, 0.36<--, 0.26<--, 0.27<--, 0.35<--            RADIOLOGY & ADDITIONAL TESTS:    Imaging Personally Reviewed:    Consultant(s) Notes Reviewed:      Care Discussed with Consultants/Other Providers: Neurology Tommy Tuttle, PGY-1  Internal Medicine  Pager:  [phone]    Patient is a 68y old  Female who presents with a chief complaint of Sepsis (24 Feb 2019 11:38)      SUBJECTIVE / OVERNIGHT EVENTS:   Patient seen and examined at bedside. No acute events overnight. Patient continuing to seize. Not responsive to voice.    MEDICATIONS  (STANDING):  amLODIPine   Tablet 5 milliGRAM(s) Oral daily  docusate sodium 100 milliGRAM(s) Oral two times a day  enoxaparin Injectable 40 milliGRAM(s) SubCutaneous every 24 hours  influenza   Vaccine 0.5 milliLiter(s) IntraMuscular once  lactobacillus acidophilus 1 Tablet(s) Oral two times a day with meals  levETIRAcetam  IVPB 1000 milliGRAM(s) IV Intermittent every 12 hours  senna 2 Tablet(s) Oral at bedtime  vancomycin  IVPB 750 milliGRAM(s) IV Intermittent every 12 hours    MEDICATIONS  (PRN):  acetaminophen   Tablet .. 650 milliGRAM(s) Oral every 6 hours PRN Mild Pain (1 - 3), Moderate Pain (4 - 6)  LORazepam   Injectable 2 milliGRAM(s) IV Push every 1 hour PRN seizure  polyethylene glycol 3350 17 Gram(s) Oral daily PRN Constipation      CAPILLARY BLOOD GLUCOSE          I&O's Summary      T(C): 36.8 (02-25-19 @ 05:55), Max: 38.4 (02-24-19 @ 09:17)  HR: 91 (02-25-19 @ 05:55) (91 - 98)  BP: 151/89 (02-25-19 @ 05:55) (133/76 - 151/89)  RR: 21 (02-25-19 @ 05:55) (21 - 23)  SpO2: 100% (02-25-19 @ 05:55) (96% - 100%)    PHYSICAL EXAM:  GENERAL: NAD, well-developed  HEAD:  Atraumatic, Normocephalic  EYES: PERRL. EOMI,  conjunctiva and sclera clear.  NECK: Supple  CHEST/LUNG: Clear to auscultation bilaterally. Large R sided chest wound, non-erythematous, non-purulent with dressing in place  HEART: Regular rate and rhythm. Normal sounding S1, S2. No murmurs, rubs, or gallops  ABDOMEN: Soft, nontender, nondistended; Bowel sounds present.  EXTREMITIES:  No clubbing, cyanosis, or edema. Peripheral pulses 2+ and symmetric. Right thigh graft site dry, non-erythematous  PSYCH: Only responding to pain  NEUROLOGY: left sided focal seizures  SKIN: No rashes or lesions    LABS:    WBC Trend: 10.45<--, 11.11<--, 12.25<--  02-25    142  |  100  |  10  ----------------------------<  61<L>  3.6   |  28  |  1.02    Ca    7.8<L>      25 Feb 2019 06:20  Phos  3.1     02-25  Mg     1.7     02-25    TPro  4.4<L>  /  Alb  1.5<L>  /  TBili  0.5  /  DBili  x   /  AST  67<H>  /  ALT  10  /  AlkPhos  456<H>  02-25    Creatinine Trend: 1.02<--, 0.25<--, 0.36<--, 0.26<--, 0.27<--, 0.35<--            RADIOLOGY & ADDITIONAL TESTS:    Imaging Personally Reviewed:    Consultant(s) Notes Reviewed:      Care Discussed with Consultants/Other Providers: Neurology

## 2019-02-25 NOTE — CONSULT NOTE ADULT - ASSESSMENT
68y old  Female with Sepsis , with recent diagnosis of metastatic breast cancer triple negative (mets to lung, adrenal gland and brain), seizures p/w poor drainage of wound vac., had a  fungating breast mass, and underwent a palliative mastectomy 2/11 and skin graft, tonic clonic seizure on Keppra. DNR/DNI , drainage from her chest wound    wound not suitable for vac at this time, consider wet to dry saline gauze, no obvious pseudomonas, currently, twice a day dressings should gently mechanically debride with santyl-  pain management  patient with high alk phos  where due to bony mets or osteomyelitis unclear. No obvious cellulitis in periwound currently; patient has vre uti consider ID consult with elevated wbc.  review oncologic options/palliative care hospice  DVT prophylaxsis    Risk  for bleeding on

## 2019-02-25 NOTE — CONSULT NOTE ADULT - SUBJECTIVE AND OBJECTIVE BOX
Patient is a 68y old  Female who presents with a chief complaint of Sepsis , with recent diagnosis of metastatic breast cancer (mets to lung, adrenal gland and brain), seizures p/w poor drainage of wound vac., had a  fungating breast mass, and underwent a palliative mastectomy 2/11 and skin graft. Hospital course was c/b a tonic clonic seizure and pt was initiated on Keppra.  Family was reluctant to make a decision regarding palliative treatment but was made DNR/DNI prior to discharge to rehab with a wound vac.  She returned because of increased drainage from her chest wound and a malfunctioning wound vac over the past few days. She has also had increased drainage from her graft site on her leg. Pt obtunded, unresponsive to verbal stimuli and collateral information obtained from daughter (HCP) at bedside. Per daughter, she was doing well at rehab and denied any fevers, cough, SOB, abd pain, dysuria, increased urination but did endorse less than usual PO intake.  ED:  VS - Tm 101.5, , /119, RR 22, SpO2 98%  Pt had seizure in ED and received tylenol, Keppra 1g IV x1, ativan IV 2mg x1, 2L LR, vanc, zosyn, KCl 10mg IV x3  Plastic surgery, MICU and neurology consulted (22 Feb 2019 02:40)       REVIEW OF SYSTEMS- patient is non verbal see hpi and pmh, others negative  Allergies    No Known Allergies    Intolerances       MEDICATIONS  (STANDING):  amLODIPine   Tablet 5 milliGRAM(s) Oral daily  docusate sodium 100 milliGRAM(s) Oral two times a day  enoxaparin Injectable 40 milliGRAM(s) SubCutaneous every 24 hours  influenza   Vaccine 0.5 milliLiter(s) IntraMuscular once  lactated ringers. 1000 milliLiter(s) (75 mL/Hr) IV Continuous <Continuous>  lactobacillus acidophilus 1 Tablet(s) Oral two times a day with meals  levETIRAcetam  IVPB 1000 milliGRAM(s) IV Intermittent every 12 hours  linezolid  IVPB 600 milliGRAM(s) IV Intermittent every 12 hours  LORazepam   Injectable 1 milliGRAM(s) IV Push every 4 hours  senna 2 Tablet(s) Oral at bedtime    MEDICATIONS  (PRN):  acetaminophen   Tablet .. 650 milliGRAM(s) Oral every 6 hours PRN Mild Pain (1 - 3), Moderate Pain (4 - 6)  LORazepam   Injectable 2 milliGRAM(s) IV Push every 1 hour PRN seizure  polyethylene glycol 3350 17 Gram(s) Oral daily PRN Constipation      FAMILY HISTORY:  Family history of pancreatic cancer  Family history of breast cancer in sister (Sibling)      Social history:DNR     PAST MEDICAL & SURGICAL HISTORY:  Do not intubate, perform CPR, or defibrillate  Do not resuscitate  Seizure  Hypertension  Breast cancer: ~ metastatic to brain, lungs, adrenal gland stage4   H/O unilateral modified radical mastectomy, right: ~ palliative, due to fungating mass  pt4c- stage 4 adenocarcinoma breast      I&O's Summary      Vital Signs Last 24 Hrs 170.18, wt 58kg, bmi 20  T(C): 36.8  HR: 91 bp 151/89  RR: 21   SpO2: 100%        PHYSICAL EXAM:  Constitutional: responds only to pain , non verbal, continually clonic tonic left sided contractions  ENMT:perrla  Back:normal  Respiratory: chest wall large open ulcer [id number] with 100% sough, <10% granulation tissue, portions of stsg intact, with some bleeding [id number], fat necrosis throughout with rib tendon exposed  Cardiovascular:rrr  Gastrointestinal:non tender   incontinent  Extremities: in extension, poor flexibility, constant contractions/  Skin:heels intact 1+ pulses DP  Musculoskeletal: as noted    CBC Full  -  ( 25 Feb 2019 06:20 )  WBC Count : 12.30 K/uL  Hemoglobin : 7.4 g/dL  Hematocrit : 23.8 %  Platelet Count - Automated : 240 K/uL  Mean Cell Volume : 90.2 fL  Mean Cell Hemoglobin : 28.0 pg  Mean Cell Hemoglobin Concentration : 31.1 %         02-25    142  |  100  |  10  ----------------------------<  61<L>  3.6   |  28  |  1.02    Ca    7.8<L>      25 Feb 2019 06:20 alk phos 456  Phos  3.1     02-25  Mg     1.7     02-25    TPro  4.4<L>  /  Alb  1.5<L>  /  TBili  0.5  /  DBili  x   /  AST  67<H>  /  ALT  10  /  AlkPhos  456<H>  02-25    urine  VRE 2/22, previous 2/11 ecoli      path ulcerated into skin, focal muscle involvement, negative margins    Radiology: cxr with bilateral masses

## 2019-02-25 NOTE — PROGRESS NOTE ADULT - PROBLEM SELECTOR PLAN 2
No disease modifying treatment at this time.  Hospice discussed.  Daughter agreeable but would like to see how she does in the next 24 hours.  Will follow-up tomorrow.

## 2019-02-25 NOTE — PROGRESS NOTE ADULT - SUBJECTIVE AND OBJECTIVE BOX
INTERVAL HPI/OVERNIGHT EVENTS: Patient unresponsive with left focal seizures     Code Status: DNR  Allergies    No Known Allergies    Intolerances    MEDICATIONS  (STANDING):  amLODIPine   Tablet 5 milliGRAM(s) Oral daily  docusate sodium 100 milliGRAM(s) Oral two times a day  enoxaparin Injectable 40 milliGRAM(s) SubCutaneous every 24 hours  influenza   Vaccine 0.5 milliLiter(s) IntraMuscular once  lactated ringers. 1000 milliLiter(s) (75 mL/Hr) IV Continuous <Continuous>  lactobacillus acidophilus 1 Tablet(s) Oral two times a day with meals  levETIRAcetam  IVPB 1000 milliGRAM(s) IV Intermittent every 12 hours  linezolid  IVPB 600 milliGRAM(s) IV Intermittent every 12 hours  senna 2 Tablet(s) Oral at bedtime    MEDICATIONS  (PRN):  acetaminophen   Tablet .. 650 milliGRAM(s) Oral every 6 hours PRN Mild Pain (1 - 3), Moderate Pain (4 - 6)  LORazepam   Injectable 2 milliGRAM(s) IV Push every 1 hour PRN seizure  polyethylene glycol 3350 17 Gram(s) Oral daily PRN Constipation      PRESENT SYMPTOMS: [ x]Unable to obtain due to poor mentation   Source if other than patient:  [ ]Family   [ ]Team     Pain (Impact on QOL):    Location:  Severity:  Minimal acceptable level (0-10 scale):       Quality:       Onset:  Duration:  Aggravating factors:  Relieving Factors  Radiation:    Dyspnea:  Yes [ ] No [ ] - [ ]Mild [ ]Moderate [ ]Severe  Anxiety:    Yes [ ] No [ ] - [ ]Mild [ ]Moderate [ ]Severe  Fatigue:    Yes [ ] No [ ] - [ ]Mild [ ]Moderate [ ]Severe  Nausea:    Yes [ ] No [ ] - [ ]Mild [ ]Moderate [ ]Severe                         Loss of appetite: Yes [ ] No [ ] - [ ]Mild [ ]Moderate [ ]Severe             Constipation:  Yes [ ] No [ ] - [ ]Mild [ ]Moderate [ ]Severe  Grief: Yes [ ] No [ ]     PAIN AD Score:	  http://geriatrictoolkit.[employer].edu/cog/painad.pdf (Ctrl + left click to view)    Other Symptoms:  [ ]All other review of systems negative     Karnofsky Performance Score/Palliative Performance Status Version 2: 10%    http://palliative.info/resource_material/PPSv2.pdf    PHYSICAL EXAM:  Vital Signs Last 24 Hrs  T(C): 36.8 (25 Feb 2019 14:50), Max: 37.2 (24 Feb 2019 21:49)  T(F): 98.2 (25 Feb 2019 14:50), Max: 99 (24 Feb 2019 21:49)  HR: 113 (25 Feb 2019 14:50) (91 - 113)  BP: 127/105 (25 Feb 2019 14:50) (127/105 - 151/89)  BP(mean): --  RR: 20 (25 Feb 2019 14:50) (20 - 23)  SpO2: 96% (25 Feb 2019 14:50) (96% - 100%) I&O's Summary       GENERAL:  Unresponsive - left focal seizures   PULMONARY:   [x ]Clear - anteriorly    [ ]Rhonchi        [ ]Right [ ]Left [ ]Bilateral  [ ]Crackles        [ ]Right [ ]Left [ ]Bilateral  [ ]Wheezing     [ ]Right [ ]Left [ ]Bilateral  CARDIOVASCULAR:    [x ]Regular [ ]Irregular [ ]Tachy  [ ]Aly [ ]Murmur [ ]Other  GASTROINTESTINAL:  [x ]Soft  [ ]Distended   [x ]+BS  [x ]Non tender [ ]Tender  [ ]PEG [ ]OGT/ NGT   Last BM: 2/21/19     GENITOURINARY:  [ ]Normal [x ] Incontinent   [ ]Oliguria/Anuria   [ ]Waller  MUSCULOSKELETAL:   [ ]Normal   [x ]Weakness  [x ]Bed/Wheelchair bound [ ]Edema  NEUROLOGIC:   [ ]No focal deficits  [x ] Cognitive impairment  [ ] Dysphagia [ ]Dysarthria [ ] Paresis [ ]Other   SKIN:   right thigh STSG site intact - right chest dressing CD&I     CRITICAL CARE:  [ ] Shock Present  [ ]Septic [ ]Cardiogenic [ ]Neurologic [ ]Hypovolemic  [ ]  Vasopressors [ ]  Inotropes   [ ] Respiratory failure present  [ ] Acute  [ ] Chronic [ ] Hypoxic  [ ] Hypercarbic [ ] Other  [ ] Other organ failure     LABS:                        7.4    12.30 )-----------( 240      ( 25 Feb 2019 06:20 )             23.8   02-25    142  |  100  |  10  ----------------------------<  61<L>  3.6   |  28  |  1.02    Ca    7.8<L>      25 Feb 2019 06:20  Phos  3.1     02-25  Mg     1.7     02-25    TPro  4.4<L>  /  Alb  1.5<L>  /  TBili  0.5  /  DBili  x   /  AST  67<H>  /  ALT  10  /  AlkPhos  456<H>  02-25        RADIOLOGY & ADDITIONAL STUDIES:    Protein Calorie Malnutrition Present: [ ] yes [ ] no  [ ] PPSV2 < or = 30%  [ ] significant weight loss [ ] poor nutritional intake [ ] anasarca [ ] catabolic state Albumin, Serum: 1.5 g/dL (02-25-19 @ 06:20)      REFERRALS:   [ ]Chaplaincy  [ ] Hospice  [ ]Child Life  [ ]Social Work  [ ]Case management [ ]Holistic Therapy   Goals of Care Document: Goals of Care Conversation - Personal Advance Directive [BRYANNA Bear] (02-13-19 @ 16:15)

## 2019-02-25 NOTE — PROGRESS NOTE ADULT - PROBLEM SELECTOR PLAN 1
Patient in status - unresponsive - discussion with daughter at bedside with primary team - she would like to maximize medications that would alleviate the seizures and focus on her comfort.  She understands that these medications might cause sedation, but treatment of the seizures is of utmost importance.  Neurology to follow-up.  Ativan 2mg IV q4h ATC with Ativan 2mg q1h PRN - low threshold to increase as needed.  Seizure precautions. On keppra.

## 2019-02-25 NOTE — CHART NOTE - NSCHARTNOTEFT_GEN_A_CORE
Patient's daughter refused dosage of 2mg IV ativan standing q4 hours, but was amenable to standing dosage of 1mg IV ativan q4 hours instead.

## 2019-02-26 LAB
BACTERIA BLD CULT: SIGNIFICANT CHANGE UP
BACTERIA BLD CULT: SIGNIFICANT CHANGE UP

## 2019-02-26 PROCEDURE — 99233 SBSQ HOSP IP/OBS HIGH 50: CPT | Mod: GC

## 2019-02-26 RX ADMIN — LINEZOLID 300 MILLIGRAM(S): 600 INJECTION, SOLUTION INTRAVENOUS at 06:06

## 2019-02-26 RX ADMIN — Medication 1 MILLIGRAM(S): at 10:27

## 2019-02-26 RX ADMIN — ENOXAPARIN SODIUM 40 MILLIGRAM(S): 100 INJECTION SUBCUTANEOUS at 06:36

## 2019-02-26 RX ADMIN — SODIUM CHLORIDE 75 MILLILITER(S): 9 INJECTION, SOLUTION INTRAVENOUS at 22:01

## 2019-02-26 RX ADMIN — Medication 1 MILLIGRAM(S): at 01:50

## 2019-02-26 RX ADMIN — LEVETIRACETAM 400 MILLIGRAM(S): 250 TABLET, FILM COATED ORAL at 18:05

## 2019-02-26 RX ADMIN — Medication 2 MILLIGRAM(S): at 18:11

## 2019-02-26 RX ADMIN — Medication 1 MILLIGRAM(S): at 06:02

## 2019-02-26 RX ADMIN — LINEZOLID 300 MILLIGRAM(S): 600 INJECTION, SOLUTION INTRAVENOUS at 18:13

## 2019-02-26 RX ADMIN — Medication 2 MILLIGRAM(S): at 22:02

## 2019-02-26 RX ADMIN — LEVETIRACETAM 400 MILLIGRAM(S): 250 TABLET, FILM COATED ORAL at 06:06

## 2019-02-26 RX ADMIN — Medication 2 MILLIGRAM(S): at 15:05

## 2019-02-26 RX ADMIN — SODIUM CHLORIDE 75 MILLILITER(S): 9 INJECTION, SOLUTION INTRAVENOUS at 06:02

## 2019-02-26 RX ADMIN — SODIUM CHLORIDE 75 MILLILITER(S): 9 INJECTION, SOLUTION INTRAVENOUS at 18:05

## 2019-02-26 NOTE — PROGRESS NOTE ADULT - SUBJECTIVE AND OBJECTIVE BOX
INTERVAL HPI/OVERNIGHT EVENTS: Continues to have seizures     Code Status: DNR  Allergies    No Known Allergies    Intolerances    MEDICATIONS  (STANDING):  amLODIPine   Tablet 5 milliGRAM(s) Oral daily  docusate sodium 100 milliGRAM(s) Oral two times a day  enoxaparin Injectable 40 milliGRAM(s) SubCutaneous every 24 hours  influenza   Vaccine 0.5 milliLiter(s) IntraMuscular once  lactated ringers. 1000 milliLiter(s) (75 mL/Hr) IV Continuous <Continuous>  lactobacillus acidophilus 1 Tablet(s) Oral two times a day with meals  levETIRAcetam  IVPB 1000 milliGRAM(s) IV Intermittent every 12 hours  linezolid  IVPB 600 milliGRAM(s) IV Intermittent every 12 hours  LORazepam   Injectable 2 milliGRAM(s) IV Push every 4 hours  senna 2 Tablet(s) Oral at bedtime    MEDICATIONS  (PRN):  acetaminophen   Tablet .. 650 milliGRAM(s) Oral every 6 hours PRN Mild Pain (1 - 3), Moderate Pain (4 - 6)  LORazepam   Injectable 2 milliGRAM(s) IV Push every 1 hour PRN seizure  polyethylene glycol 3350 17 Gram(s) Oral daily PRN Constipation      PRESENT SYMPTOMS: [x ]Unable to obtain due to poor mentation   Source if other than patient:  [ ]Family   [ ]Team     Pain (Impact on QOL):    Location:  Severity:  Minimal acceptable level (0-10 scale):       Quality:       Onset:  Duration:  Aggravating factors:  Relieving Factors  Radiation:    Dyspnea:  Yes [ ] No [ ] - [ ]Mild [ ]Moderate [ ]Severe  Anxiety:    Yes [ ] No [ ] - [ ]Mild [ ]Moderate [ ]Severe  Fatigue:    Yes [ ] No [ ] - [ ]Mild [ ]Moderate [ ]Severe  Nausea:    Yes [ ] No [ ] - [ ]Mild [ ]Moderate [ ]Severe                         Loss of appetite: Yes [ ] No [ ] - [ ]Mild [ ]Moderate [ ]Severe             Constipation:  Yes [ ] No [ ] - [ ]Mild [ ]Moderate [ ]Severe  Grief: Yes [ ] No [ ]     PAIN AD Score:	  http://geriatrictoolkit.[facility]/cog/painad.pdf (Ctrl + left click to view)    Other Symptoms:  [ ]All other review of systems negative     Karnofsky Performance Score/Palliative Performance Status Version 2:    10%    http://palliative.info/resource_material/PPSv2.pdf    PHYSICAL EXAM:  Vital Signs Last 24 Hrs  T(C): 36.3 (26 Feb 2019 14:20), Max: 37.3 (25 Feb 2019 21:43)  T(F): 97.4 (26 Feb 2019 14:20), Max: 99.2 (25 Feb 2019 21:43)  HR: 95 (26 Feb 2019 14:20) (95 - 113)  BP: 160/101 (26 Feb 2019 14:20) (127/105 - 160/101)  BP(mean): --  RR: 18 (26 Feb 2019 14:20) (18 - 20)  SpO2: 98% (26 Feb 2019 14:20) (96% - 99%) I&O's Summary       GENERAL:  Unresponsive - left focal seizures continue   PULMONARY:   [x ]Clear - anteriorly    [ ]Rhonchi        [ ]Right [ ]Left [ ]Bilateral  [ ]Crackles        [ ]Right [ ]Left [ ]Bilateral  [ ]Wheezing     [ ]Right [ ]Left [ ]Bilateral  CARDIOVASCULAR:    [x ]Regular [ ]Irregular [ ]Tachy  [ ]Aly [ ]Murmur [ ]Other  GASTROINTESTINAL:  [x ]Soft  [ ]Distended   [x ]+BS  [x ]Non tender [ ]Tender  [ ]PEG [ ]OGT/ NGT   Last BM: 2/21/19  GENITOURINARY:  [ ]Normal [x ] Incontinent   [ ]Oliguria/Anuria   [ ]Waller  MUSCULOSKELETAL:   [ ]Normal   [x ]Weakness  [ ]Bed/Wheelchair bound [ ]Edema  NEUROLOGIC:   [ ]No focal deficits  [x ] Cognitive impairment  [ ] Dysphagia [ ]Dysarthria [ ] Paresis [ ]Other   SKIN:   right chest wound cd&I, right thigh stsg intact     CRITICAL CARE:  [ ] Shock Present  [ ]Septic [ ]Cardiogenic [ ]Neurologic [ ]Hypovolemic  [ ]  Vasopressors [ ]  Inotropes   [ ] Respiratory failure present  [ ] Acute  [ ] Chronic [ ] Hypoxic  [ ] Hypercarbic [ ] Other  [ ] Other organ failure     LABS:                        7.4    12.30 )-----------( 240      ( 25 Feb 2019 06:20 )             23.8   02-25    142  |  100  |  10  ----------------------------<  61<L>  3.6   |  28  |  1.02    Ca    7.8<L>      25 Feb 2019 06:20  Phos  3.1     02-25  Mg     1.7     02-25    TPro  4.4<L>  /  Alb  1.5<L>  /  TBili  0.5  /  DBili  x   /  AST  67<H>  /  ALT  10  /  AlkPhos  456<H>  02-25        RADIOLOGY & ADDITIONAL STUDIES:    Protein Calorie Malnutrition Present: [ ] yes [ ] no  [ ] PPSV2 < or = 30%  [ ] significant weight loss [ ] poor nutritional intake [ ] anasarca [ ] catabolic state Albumin, Serum: 1.5 g/dL (02-25-19 @ 06:20)      REFERRALS:   [ ]Chaplaincy  [ ] Hospice  [ ]Child Life  [ ]Social Work  [ ]Case management [ ]Holistic Therapy   Goals of Care Document: Goals of Care Conversation - Personal Advance Directive [BRYANNA Bear] (02-13-19 @ 16:15)

## 2019-02-26 NOTE — PROGRESS NOTE ADULT - PROBLEM SELECTOR PLAN 1
Patient continues to have seizure activity despite Ativan 1mg IV q4h ATC - Ativan increased to 2mg IV q4h.  Continue Ativan 2mg IV q1h PRN.  Seizure precautions. On keppra.  Neurology to follow-up.

## 2019-02-26 NOTE — PROGRESS NOTE ADULT - SUBJECTIVE AND OBJECTIVE BOX
Tommy Tuttle, PGY-1  Internal Medicine  Pager:  [phone]    Patient is a 68y old  Female who presents with a chief complaint of Sepsis (25 Feb 2019 16:49)      SUBJECTIVE / OVERNIGHT EVENTS:   Patient seen and examined at bedside. Patient remains seizing.    MEDICATIONS  (STANDING):  amLODIPine   Tablet 5 milliGRAM(s) Oral daily  docusate sodium 100 milliGRAM(s) Oral two times a day  enoxaparin Injectable 40 milliGRAM(s) SubCutaneous every 24 hours  influenza   Vaccine 0.5 milliLiter(s) IntraMuscular once  lactated ringers. 1000 milliLiter(s) (75 mL/Hr) IV Continuous <Continuous>  lactobacillus acidophilus 1 Tablet(s) Oral two times a day with meals  levETIRAcetam  IVPB 1000 milliGRAM(s) IV Intermittent every 12 hours  linezolid  IVPB 600 milliGRAM(s) IV Intermittent every 12 hours  LORazepam   Injectable 1 milliGRAM(s) IV Push every 4 hours  senna 2 Tablet(s) Oral at bedtime    MEDICATIONS  (PRN):  acetaminophen   Tablet .. 650 milliGRAM(s) Oral every 6 hours PRN Mild Pain (1 - 3), Moderate Pain (4 - 6)  LORazepam   Injectable 2 milliGRAM(s) IV Push every 1 hour PRN seizure  polyethylene glycol 3350 17 Gram(s) Oral daily PRN Constipation      CAPILLARY BLOOD GLUCOSE          I&O's Summary      T(C): 36.9 (02-26-19 @ 06:00), Max: 37.3 (02-25-19 @ 21:43)  HR: 97 (02-26-19 @ 06:00) (97 - 113)  BP: 155/88 (02-26-19 @ 06:00) (127/105 - 160/89)  RR: 18 (02-26-19 @ 06:00) (18 - 20)  SpO2: 99% (02-26-19 @ 06:00) (96% - 99%)    PHYSICAL EXAM:  GENERAL: NAD, well-developed  HEAD:  Atraumatic, Normocephalic  EYES: PERRL. EOMI,  conjunctiva and sclera clear.  NECK: Supple  CHEST/LUNG: Clear to auscultation bilaterally. Large R sided chest wound, non-erythematous, non-purulent with dressing in place  HEART: Regular rate and rhythm. Normal sounding S1, S2. No murmurs, rubs, or gallops  ABDOMEN: Soft, nontender, nondistended; Bowel sounds present.  EXTREMITIES:  No clubbing, cyanosis, or edema. Peripheral pulses 2+ and symmetric. Right thigh graft site dry, non-erythematous  PSYCH: Only responding to pain  NEUROLOGY: left sided focal seizures  SKIN: No rashes or lesions    LABS:                        7.4    12.30 )-----------( 240      ( 25 Feb 2019 06:20 )             23.8     WBC Trend: 12.30<--, 10.45<--, 11.11<--  02-25    142  |  100  |  10  ----------------------------<  61<L>  3.6   |  28  |  1.02    Ca    7.8<L>      25 Feb 2019 06:20  Phos  3.1     02-25  Mg     1.7     02-25    TPro  4.4<L>  /  Alb  1.5<L>  /  TBili  0.5  /  DBili  x   /  AST  67<H>  /  ALT  10  /  AlkPhos  456<H>  02-25    Creatinine Trend: 1.02<--, 0.25<--, 0.36<--, 0.26<--, 0.27<--, 0.35<--            RADIOLOGY & ADDITIONAL TESTS:    Imaging Personally Reviewed:    Consultant(s) Notes Reviewed:      Care Discussed with Consultants/Other Providers:

## 2019-02-26 NOTE — PROGRESS NOTE ADULT - PROBLEM SELECTOR PLAN 1
-Neurology following, EEG confirmed focal complex status epilepticus  - lower keppra dose per family request to 1000 BID  - d/c vimpat per family request  - Started on standing ativan per palliative, but daughter wants decreased dose 2-->1  -Confirmed GOC with daughter mercedes, DNR/DNI leaning more towards comfort care, dilaudid PRN -Neurology following, EEG confirmed focal complex status epilepticus  - lower keppra dose per family request to 1000 BID  - d/c vimpat per family request  - Started on standing ativan per palliative  -Confirmed GOC with daughter mercedes, DNR/DNI leaning more towards comfort care, dilaudid PRN

## 2019-02-26 NOTE — PROGRESS NOTE ADULT - PROBLEM SELECTOR PLAN 2
No disease modifying treatment at this time.  Continue supportive care.  Patient would be inpatient hospice appropriate at this time if daughter agreeable to referral.

## 2019-02-26 NOTE — PROGRESS NOTE ADULT - PROBLEM SELECTOR PLAN 2
Sepsis of unclear source. Pt febrile, tachycardic, tachypneic with elevated white count in ED. Multiple possible sources including chest wound (plastic surgery thinks unlikely), pneumonia, or UTI. Lactate elevated but likely multifactorial in setting of seizure.  - UCx + Enteroccus faecium   - Start linezolid  - F/u BCxs

## 2019-02-27 ENCOUNTER — APPOINTMENT (OUTPATIENT)
Dept: SURGICAL ONCOLOGY | Facility: CLINIC | Age: 69
End: 2019-02-27

## 2019-02-27 PROCEDURE — 99233 SBSQ HOSP IP/OBS HIGH 50: CPT

## 2019-02-27 PROCEDURE — 99222 1ST HOSP IP/OBS MODERATE 55: CPT

## 2019-02-27 PROCEDURE — 99233 SBSQ HOSP IP/OBS HIGH 50: CPT | Mod: GC

## 2019-02-27 RX ORDER — AMLODIPINE BESYLATE 2.5 MG/1
10 TABLET ORAL DAILY
Qty: 0 | Refills: 0 | Status: DISCONTINUED | OUTPATIENT
Start: 2019-02-27 | End: 2019-03-01

## 2019-02-27 RX ORDER — LABETALOL HCL 100 MG
100 TABLET ORAL ONCE
Qty: 0 | Refills: 0 | Status: COMPLETED | OUTPATIENT
Start: 2019-02-27 | End: 2019-02-27

## 2019-02-27 RX ADMIN — Medication 100 MILLIGRAM(S): at 23:22

## 2019-02-27 RX ADMIN — Medication 2 MILLIGRAM(S): at 06:09

## 2019-02-27 RX ADMIN — LEVETIRACETAM 400 MILLIGRAM(S): 250 TABLET, FILM COATED ORAL at 06:08

## 2019-02-27 RX ADMIN — LINEZOLID 300 MILLIGRAM(S): 600 INJECTION, SOLUTION INTRAVENOUS at 18:29

## 2019-02-27 RX ADMIN — Medication 2 MILLIGRAM(S): at 21:58

## 2019-02-27 RX ADMIN — Medication 2 MILLIGRAM(S): at 02:07

## 2019-02-27 RX ADMIN — ENOXAPARIN SODIUM 40 MILLIGRAM(S): 100 INJECTION SUBCUTANEOUS at 06:09

## 2019-02-27 RX ADMIN — SODIUM CHLORIDE 75 MILLILITER(S): 9 INJECTION, SOLUTION INTRAVENOUS at 11:25

## 2019-02-27 RX ADMIN — SODIUM CHLORIDE 75 MILLILITER(S): 9 INJECTION, SOLUTION INTRAVENOUS at 06:10

## 2019-02-27 RX ADMIN — LEVETIRACETAM 400 MILLIGRAM(S): 250 TABLET, FILM COATED ORAL at 17:27

## 2019-02-27 RX ADMIN — Medication 2 MILLIGRAM(S): at 15:10

## 2019-02-27 RX ADMIN — Medication 2 MILLIGRAM(S): at 18:30

## 2019-02-27 RX ADMIN — Medication 2 MILLIGRAM(S): at 11:24

## 2019-02-27 RX ADMIN — LINEZOLID 300 MILLIGRAM(S): 600 INJECTION, SOLUTION INTRAVENOUS at 06:59

## 2019-02-27 NOTE — CONSULT NOTE ADULT - ASSESSMENT
67 yo F with recent diagnosis of metastatic breast cancer (mets to lung, adrenal gland and brain), seizures p/w poor drainage of wound vac.  Fever, leukocytosis  Chest wound, thigh wound--no signs active infection (no spreading erythema, no purulence)  BCX clear  UA negative, UCX >100k VRE  Cannot assess if patient having symptoms 2/2 UTI due to mental status  Uncertain true UTI but now has defervesed--plan to treat with brief course  Overall, UTI, positive culture finding, sepsis, leukocytosis, fever, metastatic breast CA  - Zyvox 600mg q 12, 3-5 days (will discuss with micro re: sensitivities)  - Monitor for fevers, trend WBC, monitor mental status  - F/U palliative    Mohit Austin MD  Pager [phone]  After 5pm and on weekends call [phone] 67 yo F with recent diagnosis of metastatic breast cancer (mets to lung, adrenal gland and brain), seizures p/w poor drainage of wound vac.  Fever, leukocytosis  Chest wound, thigh wound--no signs active infection (no spreading erythema, no purulence)  BCX clear  UA negative, UCX >100k VRE  Cannot assess if patient having symptoms 2/2 UTI due to mental status  Uncertain true UTI but now has defervesed--plan to treat with brief course  Fever and AMS 2/2 infection vs related to seizure?  Overall, UTI, positive culture finding, sepsis, leukocytosis, fever, metastatic breast CA  - Zyvox 600mg q 12, 3-5 days (will discuss with micro re: sensitivities)  - Monitor for fevers, trend WBC, monitor mental status  - F/U palliative    Mohit Austin MD  Pager [phone]  After 5pm and on weekends call [phone]

## 2019-02-27 NOTE — CONSULT NOTE ADULT - COMMENTS
[  ] All other systems negative aside from above.  [X] ROS unobtainable because: AMS, poorly responsive to stimuli

## 2019-02-27 NOTE — CONSULT NOTE ADULT - SUBJECTIVE AND OBJECTIVE BOX
"HPI: 69 yo F with recent diagnosis of metastatic breast cancer (mets to lung, adrenal gland and brain), seizures p/w poor drainage of wound vac. Pt was recently discharged 6 days ago after admission for a fungating breast mass. She was diagnosed with breast cancer with multiple mets to the lung and brain and underwent a palliative mastectomy. Hospital course was c/b a tonic clonic seizure and pt was initiated on Keppra. She was reluctant to make a decision regarding palliative treatment but was made DNR/DNI prior to discharge to rehab with a wound vac.    Pt presents today because of increased drainage from her chest wound and a malfunctioning wound vac over the past few days. She has also had increased drainage from her graft site on her leg. Pt obtunded, unresponsive to verbal stimuli and collateral information obtained from daughter (HCP) at bedside. Per daughter, she was doing well at rehab and denied any fevers, cough, SOB, abd pain, dysuria, increased urination but did endorse less than usual PO intake."    Above reviewed. Saw/spoke to patient. Patient poorly arousable. Not able obtain history from patient. History per chart as above. Worsening chest wound, as well as drainage from LLE graft. Here patient found to have positive UCX with VRE. Had fevers. ID called for further eval.    PAST MEDICAL & SURGICAL HISTORY:  Do not intubate, perform CPR, or defibrillate  Do not resuscitate  Seizure  Hypertension  Breast cancer: ~ metastatic to brain, lungs, adrenal gland  H/O unilateral modified radical mastectomy, right: ~ palliative, due to fungating mass    Allergies    No Known Allergies    ANTIMICROBIALS:  linezolid  IVPB 600 every 12 hours    OTHER MEDS:  acetaminophen   Tablet .. 650 milliGRAM(s) Oral every 6 hours PRN  amLODIPine   Tablet 5 milliGRAM(s) Oral daily  docusate sodium 100 milliGRAM(s) Oral two times a day  enoxaparin Injectable 40 milliGRAM(s) SubCutaneous every 24 hours  influenza   Vaccine 0.5 milliLiter(s) IntraMuscular once  lactated ringers. 1000 milliLiter(s) IV Continuous <Continuous>  lactobacillus acidophilus 1 Tablet(s) Oral two times a day with meals  levETIRAcetam  IVPB 1000 milliGRAM(s) IV Intermittent every 12 hours  LORazepam   Injectable 2 milliGRAM(s) IV Push every 4 hours  LORazepam   Injectable 2 milliGRAM(s) IV Push every 1 hour PRN  polyethylene glycol 3350 17 Gram(s) Oral daily PRN  senna 2 Tablet(s) Oral at bedtime    SOCIAL HISTORY: No tobacco, no alcohol, no illicit drugs    FAMILY HISTORY:  Family history of pancreatic cancer  Family history of breast cancer in sister (Sibling)    Drug Dosing Weight  Height (cm): 170.18 (22 Feb 2019 21:53)  Weight (kg): 58 (22 Feb 2019 21:53)  BMI (kg/m2): 20 (22 Feb 2019 21:53)  BSA (m2): 1.67 (22 Feb 2019 21:53)    PE:    Vital Signs Last 24 Hrs  T(C): 36.7 (27 Feb 2019 11:36), Max: 36.8 (26 Feb 2019 21:37)  T(F): 98 (27 Feb 2019 11:36), Max: 98.2 (26 Feb 2019 21:37)  HR: 95 (27 Feb 2019 11:36) (94 - 97)  BP: 155/90 (27 Feb 2019 11:36) (155/90 - 168/99)  RR: 18 (27 Feb 2019 11:36) (18 - 19)  SpO2: 98% (27 Feb 2019 11:36) (98% - 100%)    Gen: AOx0, NAD, chronically ill appearing, poorly arousable  CV: S1+S2 normal, nontachycardic  Resp: Clear bilat, no resp distress, no crackles/wheezes  Abd: Soft, nontender, +BS  Ext: No LE edema, no wounds  : No Waller, no suprapubic tenderness, no rash  IV/Skin: No thrombophlebitis  Msk: No low back pain, no arthralgias, no joint swelling  Neuro: No sensory deficits, no motor deficits    LABS:    12.30 > 7.4/23.8 < 240    142/3.6 100/28 10/1.02 <61      AST 67  ALT 10    MICROBIOLOGY:    URINE CATHETER  02-22-19 --  --  Enterococcus faecium VRE    BLOOD PERIPHERAL  02-21-19 NGTD    URINE MIDSTREAM  02-01-19 --  --  Escherichia coli    BLOOD PERIPHERAL  02-01-19 NGTD    RADIOLOGY:    2/21 CT:    IMPRESSION:  Intracranial metastatic disease is better visualized on prior MRI.    Within limits of a noncontrast CT scan, there has been no significant   interval change since 02/02/2019.  Repeat MRI can be performed as   clinically warranted.    2/24 CXR:    FINDINGS:     Cardiac silhouette normal in size. Small bilateral pleural effusions.   Bilateral lung masses are similar compared to the prior study. No   pneumothorax.    IMPRESSION:   No change compared to the prior study.

## 2019-02-27 NOTE — PROGRESS NOTE ADULT - PROBLEM SELECTOR PLAN 1
-Neurology following, EEG confirmed focal complex status epilepticus  - lower keppra dose per family request to 1000 BID  - d/c vimpat per family request  -Started on standing ativan per palliative  -Confirmed GOC with daughter mercedes, DNR/DNI leaning more towards comfort care, dilaudid PRN

## 2019-02-27 NOTE — PROGRESS NOTE ADULT - PROBLEM SELECTOR PLAN 2
Sepsis of unclear source. Pt febrile, tachycardic, tachypneic with elevated white count in ED. Multiple possible sources including chest wound (plastic surgery thinks unlikely), pneumonia, or UTI. Lactate elevated but likely multifactorial in setting of seizure.  - UCx + Enteroccus faecium   - Start linezolid  - F/u BCxs  - ID consult

## 2019-02-27 NOTE — PROGRESS NOTE ADULT - SUBJECTIVE AND OBJECTIVE BOX
Tommy Tuttle, PGY-1  Internal Medicine  Pager:  [phone]    Patient is a 68y old  Female who presents with a chief complaint of Sepsis (26 Feb 2019 14:47)      SUBJECTIVE / OVERNIGHT EVENTS:   Patient seen and examined at bedside. No acute events overnight. Patient still seizing, albeit looks a bit better on higher ativan dose.    MEDICATIONS  (STANDING):  amLODIPine   Tablet 5 milliGRAM(s) Oral daily  docusate sodium 100 milliGRAM(s) Oral two times a day  enoxaparin Injectable 40 milliGRAM(s) SubCutaneous every 24 hours  influenza   Vaccine 0.5 milliLiter(s) IntraMuscular once  lactated ringers. 1000 milliLiter(s) (75 mL/Hr) IV Continuous <Continuous>  lactobacillus acidophilus 1 Tablet(s) Oral two times a day with meals  levETIRAcetam  IVPB 1000 milliGRAM(s) IV Intermittent every 12 hours  linezolid  IVPB 600 milliGRAM(s) IV Intermittent every 12 hours  LORazepam   Injectable 2 milliGRAM(s) IV Push every 4 hours  senna 2 Tablet(s) Oral at bedtime    MEDICATIONS  (PRN):  acetaminophen   Tablet .. 650 milliGRAM(s) Oral every 6 hours PRN Mild Pain (1 - 3), Moderate Pain (4 - 6)  LORazepam   Injectable 2 milliGRAM(s) IV Push every 1 hour PRN seizure  polyethylene glycol 3350 17 Gram(s) Oral daily PRN Constipation      CAPILLARY BLOOD GLUCOSE          I&O's Summary      T(C): 36.4 (02-27-19 @ 06:06), Max: 36.8 (02-26-19 @ 21:37)  HR: 94 (02-27-19 @ 06:06) (94 - 97)  BP: 162/98 (02-27-19 @ 06:06) (160/97 - 168/99)  RR: 18 (02-27-19 @ 06:06) (18 - 19)  SpO2: 98% (02-27-19 @ 06:06) (98% - 100%)    PHYSICAL EXAM:  GENERAL: NAD, well-developed  HEAD:  Atraumatic, Normocephalic  EYES: PERRL. EOMI,  conjunctiva and sclera clear.  NECK: Supple  CHEST/LUNG: Clear to auscultation bilaterally. Large R sided chest wound, non-erythematous, non-purulent with dressing in place  HEART: Regular rate and rhythm. Normal sounding S1, S2. No murmurs, rubs, or gallops  ABDOMEN: Soft, nontender, nondistended; Bowel sounds present.  EXTREMITIES:  No clubbing, cyanosis, or edema. Peripheral pulses 2+ and symmetric. Right thigh graft site dry, non-erythematous  PSYCH: Only responding to pain  NEUROLOGY: left sided focal seizures  SKIN: No rashes or lesions      LABS:    WBC Trend: 12.30<--, 10.45<--, 11.11<--        Creatinine Trend: 1.02<--, 0.25<--, 0.36<--, 0.26<--, 0.27<--, 0.35<--            RADIOLOGY & ADDITIONAL TESTS:    Imaging Personally Reviewed:    Consultant(s) Notes Reviewed:      Care Discussed with Consultants/Other Providers:

## 2019-02-27 NOTE — PROGRESS NOTE ADULT - PROBLEM SELECTOR PLAN 1
Seizure activity decreased with Ativan 2mg IV q4h ATC - would utilize Ativan 2mg IV q1h PRN.  Seizure precautions. On keppra.  Continue management as per neurology and primary team.

## 2019-02-27 NOTE — PROGRESS NOTE ADULT - PROBLEM SELECTOR PLAN 2
No disease modifying treatment at this time.  Continue supportive care.  Patient is inpatient hospice appropriate No disease modifying treatment at this time.  Continue supportive care.  Patient is inpatient hospice appropriate.  Patient's daughter would like to investigate inpatient options in [place] on her own and will provide the team with an agency to refer to in order to facilitate inpatient hospice referral in [place].

## 2019-02-27 NOTE — PROVIDER CONTACT NOTE (MEDICATION) - RECOMMENDATIONS
Dr Humberto Lin made aware. New order received for Labetatol po.   medication administered as per Md order.

## 2019-02-27 NOTE — PROGRESS NOTE ADULT - SUBJECTIVE AND OBJECTIVE BOX
INTERVAL HPI/OVERNIGHT EVENTS:  Patient with left focal seizures - reduced with ativan 2mg IV     Code Status: DNR  Allergies    No Known Allergies    Intolerances    MEDICATIONS  (STANDING):  amLODIPine   Tablet 5 milliGRAM(s) Oral daily  docusate sodium 100 milliGRAM(s) Oral two times a day  enoxaparin Injectable 40 milliGRAM(s) SubCutaneous every 24 hours  influenza   Vaccine 0.5 milliLiter(s) IntraMuscular once  lactated ringers. 1000 milliLiter(s) (75 mL/Hr) IV Continuous <Continuous>  lactobacillus acidophilus 1 Tablet(s) Oral two times a day with meals  levETIRAcetam  IVPB 1000 milliGRAM(s) IV Intermittent every 12 hours  linezolid  IVPB 600 milliGRAM(s) IV Intermittent every 12 hours  LORazepam   Injectable 2 milliGRAM(s) IV Push every 4 hours  senna 2 Tablet(s) Oral at bedtime    MEDICATIONS  (PRN):  acetaminophen   Tablet .. 650 milliGRAM(s) Oral every 6 hours PRN Mild Pain (1 - 3), Moderate Pain (4 - 6)  LORazepam   Injectable 2 milliGRAM(s) IV Push every 1 hour PRN seizure  polyethylene glycol 3350 17 Gram(s) Oral daily PRN Constipation      PRESENT SYMPTOMS: [x ]Unable to obtain due to poor mentation   Source if other than patient:  [ ]Family   [ ]Team     Pain (Impact on QOL):    Location:  Severity:  Minimal acceptable level (0-10 scale):       Quality:       Onset:  Duration:  Aggravating factors:  Relieving Factors  Radiation:    Dyspnea:  Yes [ ] No [ ] - [ ]Mild [ ]Moderate [ ]Severe  Anxiety:    Yes [ ] No [ ] - [ ]Mild [ ]Moderate [ ]Severe  Fatigue:    Yes [ ] No [ ] - [ ]Mild [ ]Moderate [ ]Severe  Nausea:    Yes [ ] No [ ] - [ ]Mild [ ]Moderate [ ]Severe                         Loss of appetite: Yes [ ] No [ ] - [ ]Mild [ ]Moderate [ ]Severe             Constipation:  Yes [ ] No [ ] - [ ]Mild [ ]Moderate [ ]Severe  Grief: Yes [ ] No [ ]     PAIN AD Score:	  [email or web address].[email or web address] (Ctrl + left click to view)    Other Symptoms:  [ ]All other review of systems negative     Karnofsky Performance Score/Palliative Performance Status Version 2:  10%    http://palliative.info/resource_material/PPSv2.pdf    PHYSICAL EXAM:  Vital Signs Last 24 Hrs  T(C): 36.7 (27 Feb 2019 11:36), Max: 36.8 (26 Feb 2019 21:37)  T(F): 98 (27 Feb 2019 11:36), Max: 98.2 (26 Feb 2019 21:37)  HR: 95 (27 Feb 2019 11:36) (94 - 97)  BP: 155/90 (27 Feb 2019 11:36) (155/90 - 168/99)  BP(mean): --  RR: 18 (27 Feb 2019 11:36) (18 - 19)  SpO2: 98% (27 Feb 2019 11:36) (98% - 100%) I&O's Summary       GENERAL:  Unresponsive   PULMONARY:   [x ]Clear - anteriorly   [ ]Rhonchi        [ ]Right [ ]Left [ ]Bilateral  [ ]Crackles        [ ]Right [ ]Left [ ]Bilateral  [ ]Wheezing     [ ]Right [ ]Left [ ]Bilateral  CARDIOVASCULAR:    [x ]Regular [ ]Irregular [ ]Tachy  [ ]Aly [ ]Murmur [ ]Other  GASTROINTESTINAL:  [x ]Soft  [ ]Distended   [x ]+BS  [x ]Non tender [ ]Tender  [ ]PEG [ ]OGT/ NGT   Last BM: 2/21/19     GENITOURINARY:  [ ]Normal [x ] Incontinent   [ ]Oliguria/Anuria   [ ]Waller  MUSCULOSKELETAL:   [ ]Normal   [x ]Weakness  [x ]Bed/Wheelchair bound [ ]Edema  NEUROLOGIC:   [ ]No focal deficits  [x ] Cognitive impairment  [ ] Dysphagia [ ]Dysarthria [ ] Paresis [ ]Other   SKIN:   Right thigh STSG and right chest dressing CD&I     CRITICAL CARE:  [ ] Shock Present  [ ]Septic [ ]Cardiogenic [ ]Neurologic [ ]Hypovolemic  [ ]  Vasopressors [ ]  Inotropes   [ ] Respiratory failure present  [ ] Acute  [ ] Chronic [ ] Hypoxic  [ ] Hypercarbic [ ] Other  [ ] Other organ failure     LABS:            RADIOLOGY & ADDITIONAL STUDIES:    Protein Calorie Malnutrition Present: [ ] yes [ ] no  [ ] PPSV2 < or = 30%  [ ] significant weight loss [ ] poor nutritional intake [ ] anasarca [ ] catabolic state Albumin, Serum: 1.5 g/dL (02-25-19 @ 06:20)      REFERRALS:   [ ]Chaplaincy  [ ] Hospice  [ ]Child Life  [ ]Social Work  [ ]Case management [ ]Holistic Therapy   Goals of Care Document: Goals of Care Conversation - Personal Advance Directive [BRYANNA Bear] (02-13-19 @ 16:15)

## 2019-02-28 LAB
APTT BLD: 31.1 SEC — SIGNIFICANT CHANGE UP (ref 27.5–36.3)
BLD GP AB SCN SERPL QL: NEGATIVE — SIGNIFICANT CHANGE UP
HCT VFR BLD CALC: 19.7 % — CRITICAL LOW (ref 34.5–45)
HGB BLD-MCNC: 6 G/DL — CRITICAL LOW (ref 11.5–15.5)
INR BLD: 1.66 — HIGH (ref 0.88–1.17)
MCHC RBC-ENTMCNC: 28.4 PG — SIGNIFICANT CHANGE UP (ref 27–34)
MCHC RBC-ENTMCNC: 30.5 % — LOW (ref 32–36)
MCV RBC AUTO: 93.4 FL — SIGNIFICANT CHANGE UP (ref 80–100)
NRBC # FLD: 0.09 K/UL — LOW (ref 25–125)
PLATELET # BLD AUTO: 184 K/UL — SIGNIFICANT CHANGE UP (ref 150–400)
PMV BLD: 10.8 FL — SIGNIFICANT CHANGE UP (ref 7–13)
PROTHROM AB SERPL-ACNC: 18.7 SEC — HIGH (ref 9.8–13.1)
RBC # BLD: 2.11 M/UL — LOW (ref 3.8–5.2)
RBC # FLD: 18.1 % — HIGH (ref 10.3–14.5)
RH IG SCN BLD-IMP: POSITIVE — SIGNIFICANT CHANGE UP
WBC # BLD: 12.62 K/UL — HIGH (ref 3.8–10.5)
WBC # FLD AUTO: 12.62 K/UL — HIGH (ref 3.8–10.5)

## 2019-02-28 PROCEDURE — 99232 SBSQ HOSP IP/OBS MODERATE 35: CPT

## 2019-02-28 PROCEDURE — 99233 SBSQ HOSP IP/OBS HIGH 50: CPT | Mod: GC

## 2019-02-28 RX ORDER — LABETALOL HCL 100 MG
10 TABLET ORAL THREE TIMES A DAY
Qty: 0 | Refills: 0 | Status: DISCONTINUED | OUTPATIENT
Start: 2019-02-28 | End: 2019-03-02

## 2019-02-28 RX ORDER — LABETALOL HCL 100 MG
100 TABLET ORAL THREE TIMES A DAY
Qty: 0 | Refills: 0 | Status: DISCONTINUED | OUTPATIENT
Start: 2019-02-28 | End: 2019-02-28

## 2019-02-28 RX ADMIN — Medication 2 MILLIGRAM(S): at 14:27

## 2019-02-28 RX ADMIN — Medication 2 MILLIGRAM(S): at 21:27

## 2019-02-28 RX ADMIN — SODIUM CHLORIDE 75 MILLILITER(S): 9 INJECTION, SOLUTION INTRAVENOUS at 19:51

## 2019-02-28 RX ADMIN — LINEZOLID 300 MILLIGRAM(S): 600 INJECTION, SOLUTION INTRAVENOUS at 21:15

## 2019-02-28 RX ADMIN — Medication 2 MILLIGRAM(S): at 02:16

## 2019-02-28 RX ADMIN — SODIUM CHLORIDE 75 MILLILITER(S): 9 INJECTION, SOLUTION INTRAVENOUS at 02:22

## 2019-02-28 RX ADMIN — LEVETIRACETAM 400 MILLIGRAM(S): 250 TABLET, FILM COATED ORAL at 05:58

## 2019-02-28 RX ADMIN — Medication 2 MILLIGRAM(S): at 05:59

## 2019-02-28 RX ADMIN — LINEZOLID 300 MILLIGRAM(S): 600 INJECTION, SOLUTION INTRAVENOUS at 05:58

## 2019-02-28 RX ADMIN — LEVETIRACETAM 400 MILLIGRAM(S): 250 TABLET, FILM COATED ORAL at 21:16

## 2019-02-28 RX ADMIN — Medication 2 MILLIGRAM(S): at 10:27

## 2019-02-28 NOTE — PROGRESS NOTE ADULT - PROBLEM SELECTOR PLAN 4
RESOLVED  Hypokalemic on presentation, repleted with IV.  - Trend K+  - Send serum Mg as low Mg can make K+ repletion ineffective

## 2019-02-28 NOTE — PROGRESS NOTE ADULT - PROBLEM SELECTOR PLAN 1
-Neurology following, EEG confirmed focal complex status epilepticus  - lower keppra dose per family request to 1000 BID  - d/c vimpat per family request  - Started on standing ativan per palliative  - Confirmed GOC with daughter mercedes, DNR/DNI leaning more towards comfort care, dilaudid PRN

## 2019-02-28 NOTE — DIETITIAN INITIAL EVALUATION ADULT. - PERTINENT MEDS FT
acetaminophen   Tablet .. PRN  amLODIPine   Tablet  docusate sodium  influenza   Vaccine  labetalol Injectable PRN  lactated ringers.  lactobacillus acidophilus  levETIRAcetam  IVPB  linezolid  IVPB  LORazepam   Injectable  LORazepam   Injectable PRN  polyethylene glycol 3350 PRN  senna

## 2019-02-28 NOTE — PROGRESS NOTE ADULT - SUBJECTIVE AND OBJECTIVE BOX
CC: F/U for ? UTI    Saw/spoke to patient. Poor mental status. Non arousable, nonverbal. No fevers, no chills.    Allergies  No Known Allergies    ANTIMICROBIALS:  linezolid  IVPB 600 every 12 hours    PE:    Vital Signs Last 24 Hrs  T(C): 36.4 (28 Feb 2019 10:50), Max: 36.8 (27 Feb 2019 21:26)  T(F): 97.5 (28 Feb 2019 10:50), Max: 98.3 (28 Feb 2019 02:13)  HR: 107 (28 Feb 2019 10:50) (95 - 107)  BP: 109/73 (28 Feb 2019 10:50) (109/73 - 164/94)  RR: 18 (28 Feb 2019 10:50) (18 - 20)  SpO2: 98% (28 Feb 2019 10:50) (95% - 100%)    Gen: AOx0, NAD, non-toxic  CV: S1+S2 normal, tachycardic; R chest wound oozing blood  Resp: Clear bilat, no resp distress, no crackles/wheezes  Abd: Soft, nontender, +BS  Ext: No LE edema, no wounds; R thigh graft site appears clean, no erythema    LABS:    No new available    MICROBIOLOGY:    URINE CATHETER  02-22-19 --  --  Enterococcus faecium VRE    URINE MIDSTREAM  02-01-19 --  --  Escherichia coli    (otherwise reviewed)    RADIOLOGY:    2/24 CXR:    FINDINGS:     Cardiac silhouette normal in size. Small bilateral pleural effusions.   Bilateral lung masses are similar compared to the prior study. No   pneumothorax.    IMPRESSION:   No change compared to the prior study.

## 2019-02-28 NOTE — DIETITIAN INITIAL EVALUATION ADULT. - PERTINENT LABORATORY DATA
02-28 Na n/a   Glu n/a   K+ n/a   Cr n/a   BUN n/a   Phos n/a   Alb n/a   PAB n/a   Hgb 6.0 g/dL<LL> Hct 19.7 %<LL> Hemoglobin A1C, Whole Blood: 4.2 % (02-01-19 @ 06:50)

## 2019-02-28 NOTE — DIETITIAN INITIAL EVALUATION ADULT. - OTHER INFO
Initial Dietitian Evaluation 2/2 to extended length of stay. Per chart review patient with medical history of recent diagnosis of metastatic breast cancer (mets to lung, adrenal gland and brain) and presenting with poor drainage of wound vac, sepsis, developed seizure. Now in status epilepticus. Patient being followed by palliative. Per adult internal medicine note on 2/28 - [facility] "DNR/DNI leaning more towards comfort care."

## 2019-02-28 NOTE — DIETITIAN INITIAL EVALUATION ADULT. - ENERGY NEEDS
Height (cm): 170.18 (22 Feb 2019 21:53)  Weight (kg): 58 (22 Feb 2019 21:53)  BMI (kg/m2): 20 (22 Feb 2019 21:53)  IBW: 61.3kg +/-10%

## 2019-02-28 NOTE — DIETITIAN INITIAL EVALUATION ADULT. - PROBLEM SELECTOR PLAN 6
Discussed GOC with pt's daughter who is HCP. Confirmed that pt DNR/DNI, copy in chart of [facility]. She endorsed that pt would want to pursue treatment for reversible issues such as infection.  - DNR/DNI

## 2019-02-28 NOTE — DIETITIAN INITIAL EVALUATION ADULT. - NS AS NUTRI INTERV COLLABORAT
*To align with goals of care* Continue regular diet as tolerated. Consider family discussion to determine long term goals of care as patient not meeting nutrition needs.

## 2019-02-28 NOTE — CHART NOTE - NSCHARTNOTEFT_GEN_A_CORE
Spoke with Patients daughter, Sandy, and explained the importance of keeping patient on seizure medications. States that she doesn't think it is working and the only thing that works is Ativan.   Spoke in detail why it is important to continue seizure medication on patient despite and detrimental effects seizures can have. Daughter will speak with primary team.    Kat Newberry, PGY4

## 2019-03-01 LAB
APTT BLD: 25.8 SEC — LOW (ref 27.5–36.3)
BASOPHILS # BLD AUTO: 0.04 K/UL — SIGNIFICANT CHANGE UP (ref 0–0.2)
BASOPHILS NFR BLD AUTO: 0.3 % — SIGNIFICANT CHANGE UP (ref 0–2)
EOSINOPHIL # BLD AUTO: 0.03 K/UL — SIGNIFICANT CHANGE UP (ref 0–0.5)
EOSINOPHIL NFR BLD AUTO: 0.2 % — SIGNIFICANT CHANGE UP (ref 0–6)
HCT VFR BLD CALC: 17.3 % — CRITICAL LOW (ref 34.5–45)
HCT VFR BLD CALC: 17.3 % — CRITICAL LOW (ref 34.5–45)
HGB BLD-MCNC: 5.4 G/DL — CRITICAL LOW (ref 11.5–15.5)
HGB BLD-MCNC: 5.4 G/DL — CRITICAL LOW (ref 11.5–15.5)
IMM GRANULOCYTES NFR BLD AUTO: 13.3 % — HIGH (ref 0–1.5)
INR BLD: 1.86 — HIGH (ref 0.88–1.17)
LYMPHOCYTES # BLD AUTO: 22.6 % — SIGNIFICANT CHANGE UP (ref 13–44)
LYMPHOCYTES # BLD AUTO: 3.48 K/UL — HIGH (ref 1–3.3)
MCHC RBC-ENTMCNC: 29 PG — SIGNIFICANT CHANGE UP (ref 27–34)
MCHC RBC-ENTMCNC: 29 PG — SIGNIFICANT CHANGE UP (ref 27–34)
MCHC RBC-ENTMCNC: 31.2 % — LOW (ref 32–36)
MCHC RBC-ENTMCNC: 31.2 % — LOW (ref 32–36)
MCV RBC AUTO: 93 FL — SIGNIFICANT CHANGE UP (ref 80–100)
MCV RBC AUTO: 93 FL — SIGNIFICANT CHANGE UP (ref 80–100)
MONOCYTES # BLD AUTO: 0.71 K/UL — SIGNIFICANT CHANGE UP (ref 0–0.9)
MONOCYTES NFR BLD AUTO: 4.6 % — SIGNIFICANT CHANGE UP (ref 2–14)
NEUTROPHILS # BLD AUTO: 9.1 K/UL — HIGH (ref 1.8–7.4)
NEUTROPHILS NFR BLD AUTO: 59 % — SIGNIFICANT CHANGE UP (ref 43–77)
NRBC # FLD: 0.18 K/UL — LOW (ref 25–125)
NRBC # FLD: 0.18 K/UL — LOW (ref 25–125)
NRBC FLD-RTO: 1.1 — SIGNIFICANT CHANGE UP
NRBC FLD-RTO: 1.1 — SIGNIFICANT CHANGE UP
PLATELET # BLD AUTO: 124 K/UL — LOW (ref 150–400)
PLATELET # BLD AUTO: 124 K/UL — LOW (ref 150–400)
PMV BLD: 11.1 FL — SIGNIFICANT CHANGE UP (ref 7–13)
PMV BLD: 11.1 FL — SIGNIFICANT CHANGE UP (ref 7–13)
PROTHROM AB SERPL-ACNC: 21.1 SEC — HIGH (ref 9.8–13.1)
RBC # BLD: 1.86 M/UL — LOW (ref 3.8–5.2)
RBC # BLD: 1.86 M/UL — LOW (ref 3.8–5.2)
RBC # FLD: 16.5 % — HIGH (ref 10.3–14.5)
RBC # FLD: 16.5 % — HIGH (ref 10.3–14.5)
WBC # BLD: 15.75 K/UL — HIGH (ref 3.8–10.5)
WBC # BLD: 15.75 K/UL — HIGH (ref 3.8–10.5)
WBC # FLD AUTO: 15.75 K/UL — HIGH (ref 3.8–10.5)
WBC # FLD AUTO: 15.75 K/UL — HIGH (ref 3.8–10.5)

## 2019-03-01 PROCEDURE — 99233 SBSQ HOSP IP/OBS HIGH 50: CPT | Mod: GC

## 2019-03-01 PROCEDURE — 99232 SBSQ HOSP IP/OBS MODERATE 35: CPT

## 2019-03-01 RX ORDER — MORPHINE SULFATE 50 MG/1
0.5 CAPSULE, EXTENDED RELEASE ORAL ONCE
Qty: 0 | Refills: 0 | Status: DISCONTINUED | OUTPATIENT
Start: 2019-03-01 | End: 2019-03-01

## 2019-03-01 RX ORDER — MORPHINE SULFATE 50 MG/1
0.5 CAPSULE, EXTENDED RELEASE ORAL
Qty: 0 | Refills: 0 | Status: DISCONTINUED | OUTPATIENT
Start: 2019-03-01 | End: 2019-03-02

## 2019-03-01 RX ADMIN — Medication 2 MILLIGRAM(S): at 02:35

## 2019-03-01 RX ADMIN — LINEZOLID 300 MILLIGRAM(S): 600 INJECTION, SOLUTION INTRAVENOUS at 17:58

## 2019-03-01 RX ADMIN — Medication 2 MILLIGRAM(S): at 06:10

## 2019-03-01 RX ADMIN — Medication 2 MILLIGRAM(S): at 17:58

## 2019-03-01 RX ADMIN — MORPHINE SULFATE 0.5 MILLIGRAM(S): 50 CAPSULE, EXTENDED RELEASE ORAL at 13:46

## 2019-03-01 RX ADMIN — Medication 2 MILLIGRAM(S): at 22:38

## 2019-03-01 RX ADMIN — LINEZOLID 300 MILLIGRAM(S): 600 INJECTION, SOLUTION INTRAVENOUS at 06:10

## 2019-03-01 RX ADMIN — Medication 2 MILLIGRAM(S): at 13:46

## 2019-03-01 RX ADMIN — SODIUM CHLORIDE 75 MILLILITER(S): 9 INJECTION, SOLUTION INTRAVENOUS at 13:54

## 2019-03-01 RX ADMIN — LEVETIRACETAM 400 MILLIGRAM(S): 250 TABLET, FILM COATED ORAL at 17:58

## 2019-03-01 RX ADMIN — MORPHINE SULFATE 0.5 MILLIGRAM(S): 50 CAPSULE, EXTENDED RELEASE ORAL at 16:32

## 2019-03-01 RX ADMIN — LEVETIRACETAM 400 MILLIGRAM(S): 250 TABLET, FILM COATED ORAL at 05:29

## 2019-03-01 NOTE — ADVANCED PRACTICE NURSE CONSULT - REASON FOR CONSULT
Patient seen on skin care rounds after wound care referral received for assessment of skin impairment and recommendations of topical management. Patient known to WOCN team last seeing on 2/1 for right breast fungating lesion and scapula abrasion. Chart reviewed: Serum albumin 1.5g/dl, Total protein 4.4, Hemoglobin/HCT 5.4/17.3, INR 1.86, BMI 20.0 kg/m2, WBC 15.75, CT of the head reports intracranial occipital mets, Hemoglobin A1C 4.2%, Mayank 9-12, patient lethargic. Patient DNR. Patient H/O of metastatic breast cancer (mets to lung, adrenal gland and brain), seizures p/w poor drainage of wound vac. Pt was recently discharged 6 days ago after admission for a fungating breast mass. She was diagnosed with breast cancer with multiple mets to the lung and brain and underwent a palliative mastectomy. Hospital course was c/b a tonic clonic seizure and pt was initiated on Keppra. She was reluctant to make a decision regarding palliative treatment but was made DNR/DNI prior to discharge to rehab with a wound vac. Patient transfer from rehab with draining chest wound. Patient is being seeing by Palliative team discussion of GOC, MICU team for lactate elevation and seizures, Plastics team for non functional chest vac, neurology for seizures, ID for UTI and sepsis (As per ID, wound does not appear infected.

## 2019-03-01 NOTE — PROGRESS NOTE ADULT - SUBJECTIVE AND OBJECTIVE BOX
Tommy Tuttle, PGY-1  Internal Medicine  Pager:  [phone]    Patient is a 68y old  Female who presents with a chief complaint of Sepsis (28 Feb 2019 07:37)      SUBJECTIVE / OVERNIGHT EVENTS:   Patient seen and examined at bedside. Patient with Hb of 5.4 overnight, currently getting transfused. Patient still unresponsive, agonally breathing and seizing.    MEDICATIONS  (STANDING):  amLODIPine   Tablet 10 milliGRAM(s) Oral daily  docusate sodium 100 milliGRAM(s) Oral two times a day  influenza   Vaccine 0.5 milliLiter(s) IntraMuscular once  lactated ringers. 1000 milliLiter(s) (75 mL/Hr) IV Continuous <Continuous>  lactobacillus acidophilus 1 Tablet(s) Oral two times a day with meals  levETIRAcetam  IVPB 1000 milliGRAM(s) IV Intermittent every 12 hours  linezolid  IVPB 600 milliGRAM(s) IV Intermittent every 12 hours  LORazepam   Injectable 2 milliGRAM(s) IV Push every 4 hours  senna 2 Tablet(s) Oral at bedtime    MEDICATIONS  (PRN):  acetaminophen   Tablet .. 650 milliGRAM(s) Oral every 6 hours PRN Mild Pain (1 - 3), Moderate Pain (4 - 6)  labetalol Injectable 10 milliGRAM(s) IV Push three times a day PRN Systolic blood pressure >160  LORazepam   Injectable 2 milliGRAM(s) IV Push every 1 hour PRN seizure  polyethylene glycol 3350 17 Gram(s) Oral daily PRN Constipation      CAPILLARY BLOOD GLUCOSE          I&O's Summary    28 Feb 2019 07:01  -  01 Mar 2019 07:00  --------------------------------------------------------  IN: 900 mL / OUT: 1 mL / NET: 899 mL        T(C): 36.2 (03-01-19 @ 05:38), Max: 36.8 (02-28-19 @ 14:37)  HR: 101 (03-01-19 @ 05:38) (101 - 109)  BP: 121/67 (03-01-19 @ 05:38) (109/73 - 151/89)  RR: 23 (03-01-19 @ 05:38) (18 - 23)  SpO2: 97% (03-01-19 @ 05:38) (95% - 100%)    PHYSICAL EXAM:  GENERAL: Agonal breathing  HEAD:  Atraumatic, Normocephalic  EYES: PERRL. EOMI,  conjunctiva and sclera clear.  NECK: Supple  CHEST/LUNG: Clear to auscultation bilaterally. Large R sided chest wound, bleeding through dressing  HEART: Regular rate and rhythm. Normal sounding S1, S2. No murmurs, rubs, or gallops  ABDOMEN: Soft, nontender, nondistended; Bowel sounds present.  EXTREMITIES:  No clubbing, cyanosis, or edema. Peripheral pulses 2+ and symmetric. Right thigh graft site dry, non-erythematous  PSYCH: Only responding to pain  NEUROLOGY: left sided focal seizures  SKIN: No rashes or lesions    LABS:                        5.4    15.75 )-----------( 124      ( 01 Mar 2019 02:45 )             17.3     WBC Trend: 15.75<--, 12.62<--, 12.30<--        Creatinine Trend: 1.02<--, 0.25<--, 0.36<--, 0.26<--, 0.27<--, 0.35<--  PT/INR - ( 01 Mar 2019 02:45 )   PT: 21.1 SEC;   INR: 1.86          PTT - ( 01 Mar 2019 02:45 )  PTT:25.8 SEC          RADIOLOGY & ADDITIONAL TESTS:    Imaging Personally Reviewed:    Consultant(s) Notes Reviewed:      Care Discussed with Consultants/Other Providers:

## 2019-03-01 NOTE — ADVANCED PRACTICE NURSE CONSULT - ASSESSMENT
A&Ox0, flaccidity, currently bedbound, incontinent of urine and stool. Patient receiving supplemental oxygen via nasal cannula, Skin warm, dry with increased moisture in intertriginous folds, scattered areas of hyperpigmentation and hypopigmentation, scattered areas of ecchymosis on bilateral upper extremities. Blanchable erythema on bilateral heels. Generalized dryness and general 4+ non pitting edema.     Right anterior thigh with 3 graft donor sites. Areas of intact scabs and hypopigmentation. Periwound skin intact. No increased warmth, no induration, no erythema. (Orders in chart as per plastics)    Right chest wall (s/p breast palliative mastectomy  on 2/11)- 25moj76.5cmx0.5cm. Tissue type presents as 30% clot visualized (from 10-11 o'clock) firmly attach to skin but when attempted to clean started oozing blood, pressure place and Kaltostat placed. 40% bone exposed, 30% scattered dry, yellow. brown firmly attach eschar. Periwound skin with hypopigmentation otherwise intact. No increased warmth, no erythema, no induration. Goals of care: conservative management wound is not expected to heal patient with poor prognosis, monitor for signs of infection, control bleeding.     Risk for Incontinence associated dermatitis in bilateral buttocks and perineum. Intact skin.     Daughter at the bedside during assessment. Plan of care discussed with daughter and daughter verbalized understanding that mother's prognosis is poor and that the wound is not expected to heal and that goal of care for right chest wound at this time is symptom management. Questions answer.

## 2019-03-01 NOTE — PROGRESS NOTE ADULT - PROBLEM SELECTOR PLAN 1
-Neurology following, EEG confirmed focal complex status epilepticus  - lower keppra dose per family request to 1000 BID  - d/c vimpat per family request  - Started on standing ativan per palliative  - Confirmed GOC with daughter mercedes, DNR/DNI leaning more towards comfort care, dilaudid PRN -Neurology following, EEG confirmed focal complex status epilepticus  - lower keppra dose per family request to 1000 BID  - d/c vimpat per family request  - Started on standing ativan per palliative  - Confirmed GOC with daughter mercedes, DNR/DNI leaning more towards comfort care, started morphine PRN dyspnea

## 2019-03-01 NOTE — CHART NOTE - NSCHARTNOTEFT_GEN_A_CORE
Called to bedside by RN, pt agonally breathing. VS: afebrile, HR 110s, SpO2 96%, pt taking slow deep breaths. Pt unresponsive to painful stimuli. Spoke with HCP, Soraya Puente and updated that pt appears like she is agonally breathing. Suggested morphine or dilaudid to help patient. Soraya suggests to continue with ativan, and that she ends work at 7AM and will arrive at the hospital after work and would prefer to make the decision in person.    Jake Lin PGY1  [phone]

## 2019-03-01 NOTE — ADVANCED PRACTICE NURSE CONSULT - RECOMMEDATIONS
Palliative team to continue to follow up with patient and ongoing Goals of care discussion   Patient with poor nutrition, however, lethargic at this time. When alert consider Speech and swallow consult     Topical Recommendations     Protect posterior ears from nasal cannula with Posey offloading device.     Bilateral buttocks and perineum: Clean with skin cleanser Pat dry. Apply a thin layer of CRITIC AIDE ointment to entire area. Apply twice a day or PRN.     Right chest wall: Clean with NS. Pat dry. Apply Liquid barrier film to periwound skin. Place Adaptic touch over wound bed. Place Kaltostat (Hemostatic dressing) over adaptic touch, cover with ABD Pads and Tegaderm. Change every other day or of soiled.     Continue low air loss bed therapy, continue heel elevation with Z-flex fluidized positioning boots, continue to turn & reposition q2h with Z-danii fluidized positioning device, soft pillow between bony prominences, continue moisture management with barrier creams & single breathable pad, continue measures to decrease friction/shear/pressure.     Please contact Wound Care Service Line if we can be of further assistance (ext [phone]).

## 2019-03-01 NOTE — PROGRESS NOTE ADULT - PROBLEM SELECTOR PLAN 6
Discussed GOC with pt's daughter who is HCP. Confirmed that pt DNR/DNI, copy in chart of [facility]. She endorsed that pt would want to pursue treatment for reversible issues such as infection.  - DNR/DNI  - Palliative consult Discussed GOC with pt's daughter who is HCP. Confirmed that pt DNR/DNI, copy in chart of [facility]ADE. She endorsed that pt would want to pursue treatment for reversible issues such as infection.  - DNR/DNI  - Palliative recs appreciated  - Morphine for agonal breathing

## 2019-03-01 NOTE — PROGRESS NOTE ADULT - PROBLEM SELECTOR PLAN 3
Metastatic breast cancer to lung, adrenal gland and brain, s/p palliative radical mastectomy earlier this month. Pt undecided on course of action for management on last discharge.

## 2019-03-01 NOTE — PROGRESS NOTE ADULT - SUBJECTIVE AND OBJECTIVE BOX
CC: F/U for UTI    Saw/spoke to patient. No fevers, no chills. Poorly responsive. Family at the bedside. Agonal breathing.    Allergies  No Known Allergies    ANTIMICROBIALS:  linezolid  IVPB 600 every 12 hours    PE:    Vital Signs Last 24 Hrs  T(C): 36.4 (01 Mar 2019 14:07), Max: 36.7 (28 Feb 2019 20:00)  T(F): 97.6 (01 Mar 2019 14:07), Max: 98 (28 Feb 2019 20:00)  HR: 110 (01 Mar 2019 14:07) (101 - 110)  BP: 134/88 (01 Mar 2019 14:07) (112/64 - 134/88)  RR: 23 (01 Mar 2019 14:07) (20 - 23)  SpO2: 97% (01 Mar 2019 05:38) (95% - 100%)    Gen: AOx0, nonverbal, poorly arousable, agonal breathing  CV: S1+S2 normal, tachycardic, chest wound  Resp: Clear bilat, tachypneic  Abd: Soft, nontender, +BS  Ext: No LE edema, no wounds    LABS:                      5.4    15.75 )-----------( 124      ( 01 Mar 2019 02:45 )             17.3     MICROBIOLOGY:    URINE CATHETER  02-22-19 --  --  Enterococcus faecium VRE    URINE MIDSTREAM  02-01-19 --  --  Escherichia coli    (otherwise reviewed)    RADIOLOGY:    2/24 CXR:    FINDINGS:     Cardiac silhouette normal in size. Small bilateral pleural effusions.   Bilateral lung masses are similar compared to the prior study. No   pneumothorax.    IMPRESSION:   No change compared to the prior study.

## 2019-03-02 VITALS — TEMPERATURE: 92 F

## 2019-03-02 LAB
BASOPHILS # BLD AUTO: 0.4 K/UL — HIGH (ref 0–0.2)
BASOPHILS NFR BLD AUTO: 1.5 % — SIGNIFICANT CHANGE UP (ref 0–2)
EOSINOPHIL # BLD AUTO: 0.07 K/UL — SIGNIFICANT CHANGE UP (ref 0–0.5)
EOSINOPHIL NFR BLD AUTO: 0.3 % — SIGNIFICANT CHANGE UP (ref 0–6)
HCT VFR BLD CALC: 30 % — LOW (ref 34.5–45)
HGB BLD-MCNC: 9.2 G/DL — LOW (ref 11.5–15.5)
IMM GRANULOCYTES NFR BLD AUTO: 14.6 % — HIGH (ref 0–1.5)
LYMPHOCYTES # BLD AUTO: 22.7 % — SIGNIFICANT CHANGE UP (ref 13–44)
LYMPHOCYTES # BLD AUTO: 6.19 K/UL — HIGH (ref 1–3.3)
MCHC RBC-ENTMCNC: 30.1 PG — SIGNIFICANT CHANGE UP (ref 27–34)
MCHC RBC-ENTMCNC: 30.7 % — LOW (ref 32–36)
MCV RBC AUTO: 98 FL — SIGNIFICANT CHANGE UP (ref 80–100)
MONOCYTES # BLD AUTO: 1 K/UL — HIGH (ref 0–0.9)
MONOCYTES NFR BLD AUTO: 3.7 % — SIGNIFICANT CHANGE UP (ref 2–14)
NEUTROPHILS # BLD AUTO: 15.62 K/UL — HIGH (ref 1.8–7.4)
NEUTROPHILS NFR BLD AUTO: 57.2 % — SIGNIFICANT CHANGE UP (ref 43–77)
NRBC # FLD: 0.61 K/UL — LOW (ref 25–125)
NRBC FLD-RTO: 2.2 — SIGNIFICANT CHANGE UP
PLATELET # BLD AUTO: 111 K/UL — LOW (ref 150–400)
PMV BLD: 10.9 FL — SIGNIFICANT CHANGE UP (ref 7–13)
RBC # BLD: 3.06 M/UL — LOW (ref 3.8–5.2)
RBC # FLD: 18.2 % — HIGH (ref 10.3–14.5)
WBC # BLD: 27.27 K/UL — HIGH (ref 3.8–10.5)
WBC # FLD AUTO: 27.27 K/UL — HIGH (ref 3.8–10.5)

## 2019-03-02 PROCEDURE — 99239 HOSP IP/OBS DSCHRG MGMT >30: CPT

## 2019-03-02 RX ADMIN — Medication 2 MILLIGRAM(S): at 03:10

## 2019-03-02 RX ADMIN — LEVETIRACETAM 400 MILLIGRAM(S): 250 TABLET, FILM COATED ORAL at 06:16

## 2019-03-02 RX ADMIN — Medication 2 MILLIGRAM(S): at 06:16

## 2019-03-02 RX ADMIN — MORPHINE SULFATE 0.5 MILLIGRAM(S): 50 CAPSULE, EXTENDED RELEASE ORAL at 00:20

## 2019-03-02 RX ADMIN — SODIUM CHLORIDE 75 MILLILITER(S): 9 INJECTION, SOLUTION INTRAVENOUS at 00:20

## 2019-03-02 RX ADMIN — MORPHINE SULFATE 0.5 MILLIGRAM(S): 50 CAPSULE, EXTENDED RELEASE ORAL at 00:40

## 2019-03-02 NOTE — DISCHARGE NOTE ADULT - PATIENT PORTAL LINK FT
You can access the [email or web address] Patient Portal, offered by [facility], by registering with the following website: [email or web address]

## 2019-03-02 NOTE — PROGRESS NOTE ADULT - PROVIDER SPECIALTY LIST ADULT
Infectious Disease
Infectious Disease
Internal Medicine
Neurology
Neurology
Palliative Care
Internal Medicine
Internal Medicine

## 2019-03-02 NOTE — PROGRESS NOTE ADULT - PROBLEM SELECTOR PLAN 5
- C/w amlodipine
-Labetalol PRN for SBP > 160
- C/w amlodipine
-Labetalol PRN for SBP > 160
Hypertensive overnight given labetalol   -Will need to start on IV medication as patient cannot tolerate PO.

## 2019-03-02 NOTE — PROGRESS NOTE ADULT - ASSESSMENT
67 yo F with recent diagnosis of metastatic breast cancer (mets to lung, adrenal gland and brain), seizures presenting meeting SIRS criteria with VRE UTI, febrile in ED and developed seizure. Now in status epilepticus.
67 yo F with recent diagnosis of metastatic breast cancer (mets to lung, adrenal gland and brain), seizures p/w poor drainage of wound vac.  Fever, leukocytosis  Chest wound, thigh wound--no signs active infection (no spreading erythema, no purulence; although chest wound bloody discharge)  BCX clear  UA negative, UCX >100k VRE (discussed with micro, Zyvox S; Dapto AKASH >4)  Cannot assess if patient having symptoms 2/2 UTI due to mental status  Uncertain true UTI but now has defervesed--plan to treat with brief course  Fever and AMS 2/2 infection vs related to seizure?  Poor prognosis  Overall, UTI, positive culture finding, sepsis, leukocytosis, fever, metastatic breast CA  - Zyvox 600mg q 12, complete 5 days then DC  - Monitor for fevers, trend WBC, monitor mental status  - Wound care per primary team  - F/U palliative    Mohit Austin MD  Pager [phone]  After 5pm and on weekends call [phone]
67 yo F with recent diagnosis of metastatic breast cancer (mets to lung, adrenal gland and brain), seizures presenting meeting SIRS criteria with VRE UTI, febrile in ED and developed seizure. Now in status epilepticus.
67 yo F with recent diagnosis of metastatic breast cancer (mets to lung, adrenal gland and brain), seizures presenting meeting SIRS criteria without clear source, febrile in ED and developed seizure.
67 yo F with recent diagnosis of metastatic breast cancer (mets to lung, adrenal gland and brain), seizures presenting meeting SIRS criteria without clear source, febrile in ED and developed seizure. Now in status epilepticus.
67 yo F with recent diagnosis of metastatic breast cancer (mets to lung, adrenal gland and brain), seizures presenting meeting SIRS criteria without clear source, febrile in ED and developed seizure. Now in status epilepticus.
69 yo F with recent diagnosis of metastatic breast cancer (mets to lung, adrenal gland and brain), seizures p/w poor drainage of wound vac.  Fever, leukocytosis  Chest wound, thigh wound--no signs active infection (no spreading erythema, no purulence; although chest wound bloody discharge)  BCX clear  UA negative, UCX >100k VRE (discussed with micro, Zyvox S; Dapto AKASH >4)  Cannot assess if patient having symptoms 2/2 UTI due to mental status  Uncertain true UTI but now has defervesed--plan to treat with brief course  Fever and AMS 2/2 infection vs related to seizure?  Poor overall prognosis  Overall, UTI, positive culture finding, sepsis, leukocytosis, fever, metastatic breast CA  - Zyvox 600mg q 12, likely 5 day course  - Monitor for fevers, trend WBC, monitor mental status  - Wound care per primary team  - F/U palliative  - Discussed with primary team    Mohit Austin MD  Pager [phone]  After 5pm and on weekends call [phone]
69 yo F with recent diagnosis of metastatic breast cancer (mets to lung, adrenal gland and brain), seizures presenting meeting SIRS criteria with VRE UTI, febrile in ED and developed seizure. Now in status epilepticus.
69 yo F with recent diagnosis of metastatic breast cancer (mets to lung, adrenal gland and brain), seizures presenting meeting SIRS criteria without clear source, febrile in ED and developed seizure.
69 yo F with recent diagnosis of metastatic breast cancer (mets to lung, adrenal gland and brain), seizures presenting meeting SIRS criteria without clear source, febrile in ED and developed seizure.  Palliative consulted for goals of care.
69 yo F with recent diagnosis of metastatic breast cancer (mets to lung, adrenal gland and brain), seizures presenting meeting SIRS criteria without clear source, febrile in ED and developed seizure.  Palliative consulted for goals of care.
69 yo F with recent diagnosis of metastatic breast cancer (mets to lung, adrenal gland and brain), seizures presenting meeting SIRS criteria without clear source, febrile in ED and developed seizure. Now in status epilepticus.
Patient continues to be clinically in focal status, refractory to ativan and EEG shows a R hemispheric seizure focus. Currently on Keppra and being treated with vancomycin for sepsis. GOC discussion was had by medicine team with patient's daughter yesterday and daughter would prefer conservative management at this time as prognosis is poor and requested Vimpat to be discontinued. Given this, would not recommend any further adjustments to AED regimen.     Recommendations:  Continue Keppra for now  Can discontinue VEEG  Palliative care consult
69 yo F with recent diagnosis of metastatic breast cancer (mets to lung, adrenal gland and brain), seizures presenting meeting SIRS criteria without clear source, febrile in ED and developed seizure.  Palliative consulted for goals of care.
67 yo F with recent diagnosis of metastatic breast cancer (mets to lung, adrenal gland and brain), seizures presenting meeting SIRS criteria without clear source, febrile in ED and developed seizure.

## 2019-03-02 NOTE — PROGRESS NOTE ADULT - SUBJECTIVE AND OBJECTIVE BOX
Tommy Tuttle, PGY-1  Internal Medicine  Pager:  [phone]    Patient is a 68y old  Female who presents with a chief complaint of Sepsis (01 Mar 2019 08:01)      SUBJECTIVE / OVERNIGHT EVENTS:   Patient seen and examined at bedside. Hypothermic overnight, started on heating blanket. Remains seizing and agonally breathing    MEDICATIONS  (STANDING):  influenza   Vaccine 0.5 milliLiter(s) IntraMuscular once  levETIRAcetam  IVPB 1000 milliGRAM(s) IV Intermittent every 12 hours  LORazepam   Injectable 2 milliGRAM(s) IV Push every 4 hours    MEDICATIONS  (PRN):  bisacodyl Suppository 10 milliGRAM(s) Rectal daily PRN Constipation  labetalol Injectable 10 milliGRAM(s) IV Push three times a day PRN Systolic blood pressure >160  morphine  - Injectable 0.5 milliGRAM(s) IV Push every 2 hours PRN dyspnea      CAPILLARY BLOOD GLUCOSE          I&O's Summary    01 Mar 2019 07:01  -  02 Mar 2019 07:00  --------------------------------------------------------  IN: 1300 mL / OUT: 200 mL / NET: 1100 mL        T(C): 33.5 (03-02-19 @ 06:07), Max: 36.4 (03-01-19 @ 14:07)  HR: 94 (03-02-19 @ 06:07) (81 - 110)  BP: 123/64 (03-02-19 @ 06:07) (101/66 - 135/65)  RR: 22 (03-02-19 @ 06:07) (16 - 23)  SpO2: 96% (03-02-19 @ 06:07) (96% - 98%)    PHYSICAL EXAM:  GENERAL: Agonal breathing  HEAD:  Atraumatic, Normocephalic  EYES: PERRL. EOMI,  conjunctiva and sclera clear.  NECK: Supple  CHEST/LUNG: Clear to auscultation bilaterally. Large R sided chest wound, bleeding through dressing  HEART: Regular rate and rhythm. Normal sounding S1, S2. No murmurs, rubs, or gallops  ABDOMEN: Soft, nontender, nondistended; Bowel sounds present.  EXTREMITIES:  No clubbing, cyanosis, or edema. Peripheral pulses 2+ and symmetric. Right thigh graft site dry, non-erythematous  PSYCH: Only responding to pain  NEUROLOGY: left sided focal seizures  SKIN: No rashes or lesions      LABS:                        9.2    27.27 )-----------( 111      ( 02 Mar 2019 05:00 )             30.0     WBC Trend: 27.27<--, 15.75<--, 12.62<--        Creatinine Trend: 1.02<--, 0.25<--, 0.36<--, 0.26<--, 0.27<--, 0.35<--  PT/INR - ( 01 Mar 2019 02:45 )   PT: 21.1 SEC;   INR: 1.86          PTT - ( 01 Mar 2019 02:45 )  PTT:25.8 SEC          RADIOLOGY & ADDITIONAL TESTS:    Imaging Personally Reviewed:    Consultant(s) Notes Reviewed:  ID    Care Discussed with Consultants/Other Providers:

## 2019-03-02 NOTE — PROGRESS NOTE ADULT - PROBLEM SELECTOR PLAN 7
DVT ppx: Lovenox subq
DVT ppx: None in setting of bleeding wound  Dispo: Likely hospice
DVT ppx: Lovenox subq
DVT ppx: Lovenox subq  Dispo: Likely hospice
DVT ppx: None in setting of bleeding wound  Dispo: Likely hospice

## 2019-03-02 NOTE — PROVIDER CONTACT NOTE (OTHER) - ASSESSMENT
Patient asymptomatic
Patient lethargic and sleeping. Anuric for the shift as patient continues on IV fluids. Bladder scan 365. Dressing change done to mastectomy site. Pressure applied to alleviate bleeding
Patient asleep and unsresponsive
Pt lethargic, noted with focal seizures, head twitch and left arm and left leg, no respiratory distress noted
Pt was having seizure like activity. Pt currently stable, no seizure like activity noted at this time.
operating at baseline
patient lethargic and actively bleeding
patient warm to touch, very lethargic

## 2019-03-02 NOTE — PROGRESS NOTE ADULT - ATTENDING COMMENTS
The patient continues to demonstrate continuous left focal seizures despite treatment with ativan.  VEEG confirms the presence of a right sided seizure focus. Focal status epilepticus continues despite treatment with ativan, Vimpat and Keppra.  Would suggest D/C zosyn as it can lower seizure threshold. Maximize keppra at 1500mg BID and Vimpat at 200 BID.  Need to speak with family regarding goals of care. Will need third agent if family wishes to be aggressive, otherwise comfort care. The neurological prognosis is poor.
Agree with above. Pt remains in left focal status which had been refractory to Tx with ativan and high dose AED's. Family wishes to not be aggressive in terms of seizures.  Would recommend continue with keppra and D/C VEEG. Agree with comfort care.  Prognosis is poor.
Patient seen and examined.  Meds, labs and vitals all reviewed.  Patient examined by physician.  Agree with NP note.
Pt seen with NP.  Agree with above.  On ativan and keppra with seizures.  consider phenobarb.  If family in agreement lydia refer to [facility]
Pt seen with NP.  Agree with above.  After discussion with daughter, start ATivan 2mg iv atc and prn.  Low threshold to start additional medications including phenobarbitol.  Goal is to focus on comfort.  Pt is dnr/dni.  Hospice discussed but on hold due to pt's daughter's concerns.
Continue with standing Ativan 2 mg IV q4h, and Ativan PRN.   Hospice evaluation (in [place]?)  Appreciate ID eval for VRE UTI, c/w linezolid, 3-5 day course (Day 3 today)
pt seen and examined on rounds  no heart sounds, no breath sounds, absent pupillary reflexes, no corneal reflex, no peripheral pulses  pt pronounced dead at 12:04pm  daughter was notified
Hb 6.0, likely 2/2 bloody drainage from chest wound site  transfuse 1 u PRBCs  c/w current antiepileptic regimen  seizures appear controlled  d/w ID, c/w linezolid for 5 day course (completes tomorrow)  Inpatient hospice referrals made to [facility]
Pt remains unresponsive, in status epilepticus  Apprec Neuro f/u - pt on Keppra, family had requested Vimpat be d/c'ed   f/u Palliative Care recs, pt would be candidate for inpatient hospice   pt remains on Vanc/CTX - E. faecalis UTI - f/u sensitivities  Chest wound and R thigh wound evaluated by Plastics on admission, low suspicion for infection, awaiting Wound Care eval
VRE UTI - started linezolid, will obtain ID eval  Met w/ pt's daughter yesterday, c/w standing Ativan IV  F/u Neuro recs   Appreciate Palliative f/u  Plan for hospice referral tomorrow if no significant improvement
Transfusing PRBCs for anemia, likely 2/2 oozing chest wound   c/w current antiepileptic regimen  morphine PRN for dyspnea  hospice eval in progress   DNR/DNI  completing linezolid course for VRE UTI today  pt is actively dying

## 2019-03-02 NOTE — PROVIDER CONTACT NOTE (OTHER) - ACTION/TREATMENT ORDERED:
Ongoing monitoring for this patient.
Ongoing monitoring for this patient.
plasma and PRBC given
tylenol given.  continues on IV antibiotics
Notified Dr Tommy Tuttle, Give ativan IVP and keppra IVPB, EEG
Ongoing monitoring for this patient
Pt seen by MD, pt on continuous EEG monitoring, Medications given as ordered by MD. will continue to monitor
insert Tylenol 325 via rectum
insert Tylenol 650 via rectum, and reassess within 1hr
no new treatment was ordered at this time

## 2019-03-02 NOTE — PROGRESS NOTE ADULT - PROBLEM SELECTOR PLAN 2
UCx + Enteroccus faecium   - s/p 5 days of linezolid   - BCxs NGTD  - ID recs appreciated  - Patient hypothermic overnight, BCx/UCx drawn

## 2019-03-02 NOTE — DISCHARGE NOTE FOR THE EXPIRED PATIENT - HOSPITAL COURSE
HPI:  67 yo F with recent diagnosis of metastatic breast cancer (mets to lung, adrenal gland and brain), seizures p/w poor drainage of wound vac. Pt was recently discharged 6 days ago after admission for a fungating breast mass. She was diagnosed with breast cancer with multiple mets to the lung and brain and underwent a palliative mastectomy. Hospital course was c/b a tonic clonic seizure and pt was initiated on Keppra. She was reluctant to make a decision regarding palliative treatment but was made DNR/DNI prior to discharge to rehab with a wound vac.    Pt presents today because of increased drainage from her chest wound and a malfunctioning wound vac over the past few days. She has also had increased drainage from her graft site on her leg. Pt obtunded, unresponsive to verbal stimuli and collateral information obtained from daughter (HCP) at bedside. Per daughter, she was doing well at rehab and denied any fevers, cough, SOB, abd pain, dysuria, increased urination but did endorse less than usual PO intake.    ED:  VS - Tm 101.5, , /119, RR 22, SpO2 98%  Pt had seizure in ED and received tylenol, Keppra 1g IV x1, ativan IV 2mg x1, 2L LR, vanc, zosyn, KCl 10mg IV x3  Plastic surgery, MICU and neurology consulted (22 Feb 2019 02:40)    Hospital Course:   Patient seen by plastic surgery who instructed on wound dressing changes. Patient found to be in status epilepticus and started on keppra and vimpat. Patient's daughter requested that keppra dose be lowered and vimpat stopped per mother's previously discussed wishes. Paliative consulted who started patient on ativan for seizures and morphine for dyspnea. Patient found to have VRE UTI and completed a 5 day course of linezolid. Patient becoming anuric and chest wound bleeding. Lovenox D/C'ed and patient transfused 2 units PRBC for Hb of 5.4. Patient started agonally breathing, given morphine for comfort. Hypothermic and started on heating blanket.    At 12:04PM, patient examined at bedside. On physical exam, no heartbeat auscultated, no breath sounds appreciated, absent pupillary reflexes, no peripheral pulses. HPI:  67 yo F with recent diagnosis of metastatic breast cancer (mets to lung, adrenal gland and brain), seizures p/w poor drainage of wound vac. Pt was recently discharged 6 days ago after admission for a fungating breast mass. She was diagnosed with breast cancer with multiple mets to the lung and brain and underwent a palliative mastectomy. Hospital course was c/b a tonic clonic seizure and pt was initiated on Keppra. She was reluctant to make a decision regarding palliative treatment but was made DNR/DNI prior to discharge to rehab with a wound vac.    Pt presents today because of increased drainage from her chest wound and a malfunctioning wound vac over the past few days. She has also had increased drainage from her graft site on her leg. Pt obtunded, unresponsive to verbal stimuli and collateral information obtained from daughter (HCP) at bedside. Per daughter, she was doing well at rehab and denied any fevers, cough, SOB, abd pain, dysuria, increased urination but did endorse less than usual PO intake.    ED:  [facility] - Tm 101.5, , /119, RR 22, SpO2 98%  Pt had seizure in ED and received tylenol, Keppra 1g IV x1, ativan IV 2mg x1, 2L LR, vanc, zosyn, KCl 10mg IV x3  Plastic surgery, MICU and neurology consulted (22 Feb 2019 02:40)    Hospital Course:     Patient seen by plastic surgery who instructed on wound dressing changes. Patient found to be in status epilepticus and started on keppra and vimpat. Patient's daughter requested that keppra dose be lowered and vimpat stopped per mother's previously discussed wishes. Palliative consulted who started patient on ativan for seizures and morphine for dyspnea. Patient found to have VRE UTI and completed a 5 day course of linezolid. Patient becoming anuric and chest wound bleeding. Lovenox D/C'ed and patient transfused 2 units PRBC for Hb of 5.4. Inpatient hospice referral in progress. Patient started agonally breathing, given morphine for comfort. Hypothermic and started on heating blanket.    At 12:04PM today, patient examined at bedside. On physical exam, no heartbeat auscultated, no breath sounds appreciated, absent pupillary reflexes, no peripheral pulses. Daughter notified. No autopsy requested.

## 2019-03-02 NOTE — DISCHARGE NOTE ADULT - HOSPITAL COURSE
HPI:  69 yo F with recent diagnosis of metastatic breast cancer (mets to lung, adrenal gland and brain), seizures p/w poor drainage of wound vac. Pt was recently discharged 6 days ago after admission for a fungating breast mass. She was diagnosed with breast cancer with multiple mets to the lung and brain and underwent a palliative mastectomy. Hospital course was c/b a tonic clonic seizure and pt was initiated on Keppra. She was reluctant to make a decision regarding palliative treatment but was made DNR/DNI prior to discharge to rehab with a wound vac.    Pt presents today because of increased drainage from her chest wound and a malfunctioning wound vac over the past few days. She has also had increased drainage from her graft site on her leg. Pt obtunded, unresponsive to verbal stimuli and collateral information obtained from daughter (HCP) at bedside. Per daughter, she was doing well at rehab and denied any fevers, cough, SOB, abd pain, dysuria, increased urination but did endorse less than usual PO intake.    ED:  VS - Tm 101.5, , /119, RR 22, SpO2 98%  Pt had seizure in ED and received tylenol, Keppra 1g IV x1, ativan IV 2mg x1, 2L LR, vanc, zosyn, KCl 10mg IV x3  Plastic surgery, MICU and neurology consulted (22 Feb 2019 02:40)    Hospital Course:   Patient seen by plastic surgery who instructed on wound dressing changes. Patient found to be in status epilepticus and started on keppra and vimpat. Patient's daughter requested that keppra dose be lowered and vimpat stopped per mother's previously discussed wishes. Paliative consulted who started patient on ativan for seizures and morphine for dyspnea. Patient found to have VRE UTI and completed a 5 day course of linezolid. Patient becoming anuric and chest wound bleeding. Lovenox D/C'ed and patient transfused 2 units PRBC for Hb of 5.4. Patient started agonally breathing, given morphine for comfort. Hypothermic overnight and started on heating blanket. HPI:  69 yo F with recent diagnosis of metastatic breast cancer (mets to lung, adrenal gland and brain), seizures p/w poor drainage of wound vac. Pt was recently discharged 6 days ago after admission for a fungating breast mass. She was diagnosed with breast cancer with multiple mets to the lung and brain and underwent a palliative mastectomy. Hospital course was c/b a tonic clonic seizure and pt was initiated on Keppra. She was reluctant to make a decision regarding palliative treatment but was made DNR/DNI prior to discharge to rehab with a wound vac.    Pt presents today because of increased drainage from her chest wound and a malfunctioning wound vac over the past few days. She has also had increased drainage from her graft site on her leg. Pt obtunded, unresponsive to verbal stimuli and collateral information obtained from daughter (HCP) at bedside. Per daughter, she was doing well at rehab and denied any fevers, cough, SOB, abd pain, dysuria, increased urination but did endorse less than usual PO intake.    ED:  [facility] - Tm 101.5, , /119, RR 22, SpO2 98%  Pt had seizure in ED and received tylenol, Keppra 1g IV x1, ativan IV 2mg x1, 2L LR, vanc, zosyn, KCl 10mg IV x3  Plastic surgery, MICU and neurology consulted (22 Feb 2019 02:40)    Hospital Course:     Patient seen by plastic surgery who instructed on wound dressing changes. Patient found to be in status epilepticus and started on keppra and vimpat. Patient's daughter requested that keppra dose be lowered and vimpat stopped per mother's previously discussed wishes. Palliative consulted who started patient on ativan for seizures and morphine for dyspnea. Patient found to have VRE UTI and completed a 5 day course of linezolid. Patient becoming anuric and chest wound bleeding. Lovenox D/C'ed and patient transfused 2 units PRBC for Hb of 5.4. Inpatient hospice referral in progress. Patient started agonally breathing, given morphine for comfort. Hypothermic and started on heating blanket.    At 12:04PM today, patient examined at bedside. On physical exam, no heartbeat auscultated, no breath sounds appreciated, absent pupillary reflexes, no peripheral pulses. Daughter notified. No autopsy requested.

## 2019-03-02 NOTE — PROVIDER CONTACT NOTE (OTHER) - BACKGROUND
Dx: Sepsis, Seizure PMHx: Metastatic breast cancer, HTN
Dx: Sepsis, seizure PMhx: metastatic breast cancer, HTN
Dx: Sepsis PMhx: breast Cancer with mets
Pt admitted with sepsis, has breast cancer with mets to brain
Pt with history of seizures came in with sepsis. Pt is DNR, DNI.
admitted for sepsis,
admitted with sepsis
admitted with sepsis

## 2019-03-02 NOTE — CHART NOTE - NSCHARTNOTEFT_GEN_A_CORE
Notified by RN at 2am that pt is hypothermic.    Spoke with daughter Sandy Puente over the phone and informed her that her temperature is low. Informed her that typically we send cultures when someone has a fever or a low temperature but that the cultures are unlikely to make any difference to her care or her prognosis and would inflict undue suffering on her, if she still has some mental status that we are unable to ascertain. Daughter requested that we draw cultures in case we "find something" and that she would like to know the cause. I informed her that we should be more focused on treating the patient however she still wanted blood sent.    A/P: 68F metastatic CA, actively dying, hospice planning, now hypothermic  - F/u BCx, UA/UCx per family request  - Hypothermia blanket  - Continued GOC discussions with family  - Will hold off on carolyne Lin PGY1 Notified by RN at 2am that pt is hypothermic.    Spoke with daughter Sandy Puente over the phone and informed her that her temperature is low. Informed her that typically we send cultures when someone has a fever or a low temperature but that the cultures are unlikely to make any difference to her care or her prognosis and would inflict undue suffering on her, if she still has some mental status that we are unable to ascertain. Daughter requested that we draw cultures in case we "find something" and that she would like to know the cause. I informed her that we should be more focused on treating the patient however she still wanted blood sent.    A/P: 68F metastatic CA, actively dying, hospice planning, now hypothermic  - F/u BCx per family request  - Unable to obtain UA/UCx as pt only with some sediments w/ straight cath previously  - Hypothermia blanket  - Continued GOC discussions with family  - Will hold off on carolyne Lin PGY1

## 2019-03-02 NOTE — PROGRESS NOTE ADULT - PROBLEM SELECTOR PROBLEM 6
Goals of care, counseling/discussion

## 2019-03-02 NOTE — PROVIDER CONTACT NOTE (OTHER) - REASON
PAtient with hemoglobin of 6.0 and HCT of 19.7
Patient hypothermic
Patient with temp
Pt is febrile 100.3
Pt is febrile 100.9
Seizure like activity
on reassessment Pt continue to be febrile 100.4
seizure
/97
Anuric - bladder scan 365 and bright red blood from mastectomy wound to right breast
Known

## 2019-03-02 NOTE — PROGRESS NOTE ADULT - PROBLEM SELECTOR PLAN 6
Discussed GOC with pt's daughter who is HCP. Confirmed that pt DNR/DNI, copy in chart of [facility]. She endorsed that pt would want to pursue treatment for reversible issues such as infection.  - DNR/DNI  - Palliative recs appreciated  - Morphine for agonal breathing

## 2019-03-02 NOTE — PROVIDER CONTACT NOTE (OTHER) - SITUATION
/97
Anuric - bladder scan 365 and bright red blood from mastectomy wound to right breast
Patient hypothermic
Patient with right chest wound site bleeding.
Patient with temp of 101.1
Pt having seizure like activity. Seizure precautions maintained, airway maintained, Dr Tuttle at bedside
Pt noted with seizure episodes
Pt with rectal temp of 100.3
Pt with rectal temp of 100.4
Pt with rectal temp of 100.9

## 2019-03-02 NOTE — PROVIDER CONTACT NOTE (OTHER) - RECOMMENDATIONS
Dr Lin made aware. New order received for straight catheter for residual urine
Dr. Lin made aware. No new orders at this time.
Give ativan IVP and keppra IVPB, EEG
New order received for CBC and hypothermic blanket
apply cold packs to body and reassess within 1hr
give Tylenol
give Tylenol
see pt

## 2019-03-02 NOTE — PROVIDER CONTACT NOTE (OTHER) - DATE AND TIME:
28-Feb-2019 02:13
28-Feb-2019 05:01
02-Mar-2019 02:28
22-Feb-2019 16:43
22-Feb-2019 21:45
23-Feb-2019 23:45
24-Feb-2019 05:57
24-Feb-2019 09:20
24-Feb-2019 21:32
28-Feb-2019 12:54

## 2019-03-02 NOTE — PROGRESS NOTE ADULT - PROBLEM SELECTOR PROBLEM 4
Hypokalemia
Palliative care encounter
Hypokalemia
Palliative care encounter
Palliative care encounter

## 2019-03-02 NOTE — PROGRESS NOTE ADULT - PROBLEM SELECTOR PLAN 1
-Neurology following, EEG confirmed focal complex status epilepticus  - lower keppra dose per family request to 1000 BID  - d/c vimpat per family request  - Started on standing ativan per palliative  - Confirmed GOC with daughter mercedes, DNR/DNI leaning more towards comfort care, started morphine PRN dyspnea

## 2019-03-03 LAB
SPECIMEN SOURCE: SIGNIFICANT CHANGE UP
SPECIMEN SOURCE: SIGNIFICANT CHANGE UP

## 2019-03-07 LAB
BACTERIA BLD CULT: SIGNIFICANT CHANGE UP
BACTERIA BLD CULT: SIGNIFICANT CHANGE UP

## 2019-07-02 NOTE — PROGRESS NOTE ADULT - PROBLEM SELECTOR PLAN 6
Call placed to Dr. Kenny at this time regarding patient admission. Report as follows:     patients arrives to labor [place] at 33w3d gestation with C/O LUQ that started at 2330 this afternoon. Pt states is more sore with palpation. Denies any pregnancy complications. Denies bleeding/LOF. Blood pressures 149/91, 140/72, 132/81. Denies H/A, visual disturbances, no edema, lung sounds clear, brisk reflexes bilateratlly, no clonus noted.   Category one fetal tracing with intermittent irritability. SVE 1 cm inner os, 2 cm outer, 25% effaced, -1 station with unknown position.    Order for CBC, U/A, regular diet, tylenol 650 PRN pain. Observe overnight. C/T monitor.   -made DNR/DNI, MOL signed. discussed with primary team    -HCP form completed: Daughter (Sandy Puente), named HCP. Co- Adriano Galan named as alternate proxy. Form placed in chart, copy provided to daughter.    -daughter's goal is to get patient to the rehab facility in [place] that she works in. She acknowledges that her mother does not want medications but when asked about hospice she said she is not ready for that referral -made DNR/DNI, MOL signed. discussed with primary team    -HCP form completed: Daughter (Sandy Puente), named HCP. Co- Adriano Galan named as alternate proxy. Form placed in chart, copy provided to daughter.    -daughter's goal is to get patient to the rehab facility in [place] that she works in. She acknowledges that her mother does not want medications but when asked about hospice she said she is not ready for that referral    -will sign off, please call us back for any issues

## 2019-08-20 NOTE — PROGRESS NOTE ADULT - PROBLEM SELECTOR PLAN 2
Waitlist  Location: [facility]. [facility]. Good Prep   Procedure: Colonoscopy  Date: 8/12/14  Provider: CHRISTINA SOTELO MD  Sedation Used: IV Sedation  Findings: Polyps  Repeat: 5 YEARS  Notes:      Presents with R fungating breast mass, + Malodorous, + Drainage found to have likely mets to brain and lungs  - Wound care recs in place  - Surgery recommended palliative mastectomy, patient considering  -Per CTA chest, neg for PE though there is mass effect on RUL pulmonary artery. Currently breathing on RA.  - oxycodone 2.5mg q6h prn for pain  - mets to brain, adrenals, lung, skull Presents with R fungating breast mass, + Malodorous, + Drainage found to have likely mets to brain and lungs  - Wound care recs in place  - Surgery recommended palliative mastectomy, patient considering  -Per CTA chest, neg for PE though there is mass effect on RUL pulmonary artery. Currently breathing on RA.  - oxycodone 2.5mg q6h prn for pain  - mets to brain, adrenals, lung, skull  - for palliative mastectomy next week

## 2019-10-29 NOTE — H&P ADULT - PROBLEM/PLAN-3
Interval History: patient is alert.    Review of Systems   Constitutional: Positive for activity change, appetite change and fatigue.   HENT: Negative for congestion.    Eyes: Negative for discharge and itching.   Respiratory: Negative for apnea and chest tightness.    Cardiovascular: Negative for chest pain and leg swelling.   Gastrointestinal: Negative for abdominal distention.   Endocrine: Negative for heat intolerance.   Neurological: Positive for speech difficulty and weakness.     Objective:     Vital Signs (Most Recent):  Temp: 98 °F (36.7 °C) (10/29/19 0745)  Pulse: 64 (10/29/19 0745)  Resp: 18 (10/29/19 0745)  BP: (!) 151/72 (10/29/19 0745)  SpO2: 99 % (10/29/19 0745) Vital Signs (24h Range):  Temp:  [98 °F (36.7 °C)-99 °F (37.2 °C)] 98 °F (36.7 °C)  Pulse:  [] 64  Resp:  [16-24] 18  SpO2:  [98 %-100 %] 99 %  BP: (128-191)/(65-98) 151/72     Weight: 61.7 kg (136 lb 0.4 oz)  Body mass index is 18.97 kg/m².  No intake or output data in the 24 hours ending 10/29/19 1112   Physical Exam   Constitutional: She is oriented to person, place, and time. No distress.   HENT:   Head: Atraumatic.   Eyes: EOM are normal.   Neck: Neck supple.   Cardiovascular: Normal rate and regular rhythm.   Pulmonary/Chest: Effort normal and breath sounds normal.   Abdominal: Soft.   Neurological: She is oriented to person, place, and time.   Skin: Skin is warm and dry.       Significant Labs:   BMP:   Recent Labs   Lab 10/28/19  1841   *      K 5.0      CO2 20*   BUN 25*   CREATININE 1.3   CALCIUM 10.5     CBC:   Recent Labs   Lab 10/28/19  1841   WBC 3.64*   HGB 11.7*   HCT 36.3*        CMP:   Recent Labs   Lab 10/28/19  1841      K 5.0      CO2 20*   *   BUN 25*   CREATININE 1.3   CALCIUM 10.5   PROT 9.6*   ALBUMIN 4.0   BILITOT 0.4   ALKPHOS 94   AST 21   ALT 13   ANIONGAP 9   EGFRNONAA 42*       Significant Imaging:reviewed.   DISPLAY PLAN FREE TEXT

## 2020-10-14 NOTE — PATIENT PROFILE ADULT - NSPROMUTANXFEARADDRESSFT_GEN_A_NUR
Goals of care, counseling/discussion Goals of care, counseling/discussion Goals of care, counseling/discussion Goals of care, counseling/discussion n/a

## 2021-01-06 NOTE — PROGRESS NOTE ADULT - PROBLEM SELECTOR PLAN 3
- Rt Breast Fungating, Necrotic Mass, + Malodorous, + Drainage. Likely metastatic breast cancer   - Surgery recommended palliative mastectomy, however patient says she needs time to think about it  - likely superinfected given leukocytosis and tachycardia and odor. Started on vanc zosyn. ID and wound care consulted   - f/u CT Chest/A/P/Head for staging Purse String (Intermediate) Text: Given the location of the defect and the characteristics of the surrounding skin a purse string intermediate closure was deemed most appropriate.  Undermining was performed circumfirentially around the surgical defect.  A purse string suture was then placed and tightened.

## 2021-09-20 NOTE — CONSULT NOTE ADULT - PROBLEM SELECTOR RECOMMENDATION 9
1. Case d/w Dr. Elizabeth- No acute neurosurgical intervention  2. MRI brain +/- to further evaluate area of abnormality on CT
Preanesthetic Assessment





- Anesthesia/Transfusion/Family Hx


Anesthesia History: Prior Anesthesia Without Reaction


Family History of Anesthesia Reaction: No


Transfusion History: No Prior Transfusion(s)


Intubation History: Unknown





- Review of Systems


General: No Symptoms


Pulmonary: Other (LAUREN, failed CPAP)


Cardiovascular: Other (HTN)


Gastrointestinal: Other (GERD with TUMs usage and resolves it, S/P gastric 

bypass)


Neurological: No Symptoms


Other: Reports: Diabetes (DM 2), Thyroid Problems





- Physical Assessment


NPO Status Date: 09/19/21


NPO Status Time: 20:00


Weight: 98.1 kg


ASA Class: 3


Mental Status: Alert & Oriented x3


Airway Class: Mallampati = 2


Dentition: Reports: Normal Dentition


Thyro-Mental Finger Breadths: 3


Mouth Opening Finger Breadths: 3


ROM/Head Extension: Full


Lungs: Clear to Auscultation, Normal Respiratory Effort


Cardiovascular: Regular Rate, Regular Rhythm





- Imaging/EKG


Impressions: 





ekg nsr at 65





- Allergies


Allergies/Adverse Reactions: 


                                    Allergies











Allergy/AdvReac Type Severity Reaction Status Date / Time


 


lisinopril AdvReac  Cough Verified 09/18/21 11:58














- Acknowledgements


Anesthesia Type Planned: Spinal


Pt an Appropriate Candidate for the Planned Anesthesia: Yes


Alternatives and Risks of Anesthesia Discussed w Pt/Guardian: Yes


Pt/Guardian Understands and Agrees with Anesthesia Plan: Yes





PreAnesthesia Questionnaire


HEENT History: Reports: Impaired Vision, Other (See Below)


Other HEENT History: wears glasses, contact


Cardiovascular History: Reports: Hypertension


Respiratory History: Reports: Sleep Apnea


Gastrointestinal History: Reports: GERD, Other (See Below)


Other Gastrointestinal History: ulcerative colitis


Genitourinary History: Reports: None


OB/GYN History: Reports: None


Musculoskeletal History: Reports: Arthritis


Neurological History: Reports: None


Psychiatric History: Reports: Depression


Endocrine/Metabolic History: Reports: Hypothyroidism


Hematologic History: Reports: None


Immunologic History: Reports: None


Oncologic (Cancer) History: Reports: None


Dermatologic History: Reports: None





- Infectious Disease History


Infectious Disease History: Reports: None





- Past Surgical History


Head Surgeries/Procedures: Reports: None


Cardiovascular Surgical History: Reports: None


Respiratory Surgical History: Reports: None


GI Surgical History: Reports: Appendectomy, Bariatric Procedure, Colonoscopy


Female  Surgical History: Reports: Hysterectomy


Male  Surgical History: Reports: None


Endocrine Surgical History: Reports: None


Neurological Surgical History: Reports: None


Musculoskeletal Surgical History: Reports: Knee Replacement


Oncologic Surgical History: Reports: None


Dermatological Surgical History: Reports: None





- SUBSTANCE USE


Tobacco Use Status *Q: Former Tobacco User


Recreational Drug Use History: No





- HOME MEDS


Home Medications: 


                                    Home Meds





Cholecalciferol (Vitamin D3) [Vitamin D3] 10,000 unit PO DAILY 06/11/21 

[History]


Diclofenac Sodium [Voltaren 1% Gel] 1 dose TOP BID PRN 06/11/21 [History]


Levothyroxine 25 mcg PO DAILY 06/11/21 [History]


Losartan/Hydrochlorothiazide [Losartan-HCTZ 50-12.5 MG] 1 tab PO DAILY 06/11/21 

[History]


Lysine 500 mg PO DAILY 06/11/21 [History]


Magnesium 200 mg PO DAILY 06/11/21 [History]


Multivitamin 1 tab PO DAILY 06/11/21 [History]


estradioL [Estradiol] 0.5 mg PO DAILY 06/11/21 [History]


hydroCHLOROthiazide [Hydrochlorothiazide] 12.5 mg PO DAILY 06/11/21 [History]


sulfaSALAzine [Azulfidine] 1,000 mg PO DAILY 06/11/21 [History]


Cyclobenzaprine [Flexeril] 10 mg PO BID PRN #20 tab 09/17/21 [Rx]


Rivaroxaban [Xarelto] 10 mg PO DAILY #30 tab 09/17/21 [Rx]


oxyCODONE 5 - 10 mg PO Q4H PRN #40 tab 09/17/21 [Rx]











- CURRENT (IN HOUSE) MEDS


Current Meds: 





                               Current Medications





Acetaminophen (Acetaminophen 325 Mg Tab)  975 mg PO ONETIME ASHLYN


   Stop: 09/20/21 13:00


Morphine Sulfate 8 mg/Epinephrine HCl 0.3 mg/Cefuroxime Sodium 750 mg/Ketorolac 

Tromethamine 30 mg/Sodium Chloride 7.9 ml  0 mg .XX ASDIRECTED PRN


   PRN Reason: Pain


   Stop: 09/20/21 13:00


Cyclobenzaprine HCl (Cyclobenzaprine 10 Mg Tab)  10 mg PO ONETIME PRN


   PRN Reason: post op pain control


   Stop: 09/20/21 16:00


Lactated Ringer's (Ringers, Lactated)  1,000 mls @ 125 mls/hr IV ASDIRECTED ASHLYN


   Stop: 09/20/21 23:00


Lidocaine/Sodium Bicarbonate (Lidocaine 1%/Sod Bicarbonate In Ns 8.4% 1 Ml 

Syringe)  0.25 ml IDERM ONETIME PRN


   PRN Reason: Prior to IV Start


   Stop: 09/20/21 18:00


Oxycodone HCl (Oxycodone 5 Mg Tab)  5 - 10 mg PO ONETIME PRN


   PRN Reason: post op pain control


   Stop: 09/20/21 16:00


Oxycodone HCl (Oxycodone Er 10 Mg Tab.Er)  10 mg PO ONETIME ASHLYN


   Stop: 09/20/21 13:00


Pregabalin (Pregabalin 25 Mg Cap)  50 mg PO ONETIME ASHLYN


   Stop: 09/20/21 13:00


Sodium Chloride (Sodium Chloride 0.9% 10 Ml Syringe)  10 ml FLUSH ASDIRECTED PRN


   PRN Reason: Keep Vein Open


   Stop: 09/20/21 18:00
Likely multifactorial from restriction due to burdensome Breast mass as well as associated lung disease.  -rec'd Oxy IR 5mg q4h for Pain/Dyspnea

## 2022-09-15 NOTE — PROGRESS NOTE ADULT - PROBLEM SELECTOR PLAN 9
Explained pt's labs; pt verbalized understanding and had no further questions or concerns at this time   DVT PPx: lovenox 40mg QD   Diet: DASH diet  Dispo: goal for dc to [facility] tomorrow DVT PPx: lovenox 40mg QD   Diet: DASH diet  Dispo: goal for dc to [facility] tomorrow  Code Status: DNR/DNI

## 2023-02-13 NOTE — ED PROVIDER NOTE - PSH
No significant past surgical history Take Flexeril 5 mg every 8 hours as needed for muscle spasm -- causes drowsiness; no drinking alcohol or driving with this medication.  Take Motrin 600 mg every 8 hours as needed for moderate pain or fevers -- take with food.  Take Tylenol 650mg (Two 325 mg pills) every 4-6 hours as needed for pain or fevers.  Advance activity as tolerated.  Continue all previously prescribed medications as directed unless otherwise instructed.  Follow up with your primary care physician in 48-72 hours- bring copies of your results.  Return to the ER for worsening or persistent symptoms, and/or ANY NEW OR CONCERNING SYMPTOMS: numbness or tingling in legs, difficulty walking, fever, difficulty urinating. If you have issues obtaining follow up, please call: [phone] to obtain a doctor or specialist who takes your insurance in your area.  You may call [phone] to make an appointment with the internal medicine clinic.

## 2023-04-03 NOTE — H&P ADULT - NSHPREVIEWOFSYSTEMS_GEN_ALL_CORE
Unable to obtain 2/2 pt clinical status Cimetidine Counseling:  I discussed with the patient the risks of Cimetidine including but not limited to gynecomastia, headache, diarrhea, nausea, drowsiness, arrhythmias, pancreatitis, skin rashes, psychosis, bone marrow suppression and kidney toxicity.

## 2023-09-05 NOTE — PROGRESS NOTE ADULT - ATTENDING COMMENTS
Evetta Pigeon called requesting a refill on the following medications:  Requested Prescriptions     Pending Prescriptions Disp Refills    tamsulosin (FLOMAX) 0.4 MG capsule [Pharmacy Med Name: TAMSULOSIN HCL 0.4 MG CAPSULE] 90 capsule 3     Sig: [id number] verified:  .pv      Date of last visit: 07/12/2022  Date of next visit (if applicable): Visit date not found appropriate response to transfusion  no PE, but with occipital mets with surrounding edema - neurosx consulted by me - ? steroids, neuro check frequency, AED  pt resistant to MR and bx at this point.   cw Rocephin for UTI, follow up sensitivities  palliative follow up kathleen appropriate response to transfusion  no PE, but with occipital mets with surrounding edema - neurosx consulted by me - ? steroids, neuro check frequency, AED  pt resistant to MR and bx at this point.   cw Rocephin for UTI, follow up sensitivities  elevated lactate likely due to tumor burden   palliative follow up kathleen

## 2023-09-12 NOTE — PROGRESS NOTE ADULT - ASSESSMENT
[facility]       Patient ID: Curly Garvey is a 68 y.o. female Date of Birth 1950     Location of Service: [id number] [place] [address]    Performed wound round with: Wound team     Chief Complaint : Sacrum, abdomen, right lower leg wounds    Wound Instructions:  Wound: Sacrum and buttocks  Cleanse with soap and water, pat dry  Apply hydraguard to skin  Twice a day and as needed for soiling    Apply moisturizing lotion to bilateral lower legs    Wound: Abdomen  Cleanse with normal saline solution or wound cleanser  Apply Skin-Prep to periwound area and wound bed  Daily and as needed for soiling    Offload all wounds  Turn and reposition frequently  Increase protein intake. Monitor for any sign of infection or worsening, inform PCP or patient's primary physician in your facility. Allergies  Latex and Other      Assessment & Plan:  1. Arterial leg ulcer (HCC)  Assessment & Plan:  Wound on the right lower leg is healed  Moisturizing lotion daily        2. S/P exploratory laparotomy  Assessment & Plan:  S/p ex lap, sigmoidectomy on 7/2/2022  -Wound was healed on October, 2022. Wound reopened and heal again on January, 2024. Wound reopened again.  -Wound improved, covered with superficial eschar  Local wound care with Skin-Prep  Sign off. Facility staff will continue to provide treatment and monitor the wound. Can reconsult wound nurse practitioner if wound failed to heal or worsened. 3. Pressure injury of sacral region, stage 2 ([address])  Assessment & Plan:  Wound on the sacrum is healed  Local wound care with hydraguard  Continue to offload  Sign off          4. Paraplegia following spinal cord injury [facility])  Assessment & Plan:  On 24/7 restorative care             Subjective:   September 5, 2023. New consult for wound on the buttocks, abdomen, and right lower leg.   As per medical record review, patient was readmitted at [facility] for bowel obstruction on and was discharged back to short-term rehab. During her stay at [facility], she had a wound on the abdomen. Wound VAC was placed on the abdomen from August 28 to 30. Currently on local wound care. Patient also has wound on buttocks and right lower leg. September 12, 2023. Follow-up for wound on the buttocks, abdomen, and right lower leg. Received patient in bed, seems comfortable. Denies pain. Review of Systems   Constitutional: Negative. Respiratory: Negative. Gastrointestinal: Negative. Genitourinary:        Suprapubic catheter dislodges       Objective:    Physical Exam  Constitutional:       Appearance: Normal appearance. Cardiovascular:      Rate and Rhythm: Normal rate. Pulmonary:      Effort: Pulmonary effort is normal.   Genitourinary:     Comments: Suprapubic catheter dislodged, incontinent of large urine  Musculoskeletal:      Comments: paraplegia   Skin:     Findings: Lesion present. Comments: Wound on the sacrum is healed    Wound on the right lower leg is healed    Abdomen: Wound size is 1 x 1 cm.,  100% eschar, no drainage, no obvious sign of infection   Neurological:      Mental Status: She is alert. Procedures           Patient's care was coordinated with nursing facility staff. Recent vitals, labs and updated medications were reviewed on EMR or chart system of facility. Past Medical, surgical, social, medication and allergy history and patient's previous records were reviewed and updated as appropriate: Most up-to date information is available in the facility EMR where the patient is currently admitted.     Patient Active Problem List   Diagnosis   • Neurogenic bladder   • Chronic osteomyelitis (HCC)   • Depression   • DVT (deep venous thrombosis) ([facility])   • Heart murmur   • HTN (hypertension)   • Hyperlipidemia   • Hyponatremia   • Pressure ulcer of contiguous region involving left buttock and hip, unstageable ([address])   • Kidney 68yoF with poor medical care and no known PMH presents with multiple months of growing, fungating, malodorous right breast mass, fall and dizziness. Likely metastatic breast cancer. Found to be severely anemic on lab work. lesion, native, right   • Neurogenic bowel   • Paralysis (HCC)   • Paraplegia following spinal cord injury ([address])   • Parkinson's disease ([address])   • Postoperative anemia due to acute blood loss   • Seizure disorder (HCC)   • Protein-calorie malnutrition (HCC)   • Chronic right shoulder pain   • CHF (congestive heart failure) (HCC)   • Septic arthritis of hip (HCC)   • Atrial fibrillation with RVR (HCC)   • Chronic anticoagulation   • Pressure injury of left buttock, stage 4 (HCC)   • Loose stools   • Anemia due to chronic illness   • Indigestion   • Deep tissue injury   • Open wound   • Venous ulcer (HCC)   • Pressure injury of left foot, unstageable (HCC)   • S/P exploratory laparotomy   • Arterial leg ulcer (HCC)   • Pressure injury of buttock, stage 3 (HCC)   • Pressure injury of sacral region, stage 2 (HCC)     Past Medical History:   Diagnosis Date   • Back pain    • Congestive heart failure (HCC)    • Depressive disorder    • Hypertension    • Neurogenic bladder    • Open wound of leg    • Osteoarthritis    • Osteomyelitis (HCC)    • Paraplegia (HCC)    • Seizure (HCC)    • Thrombosis    • Ulcer of ankle ([address])    • Urinary retention      Past Surgical History:   Procedure Laterality Date   • CYSTOSCOPY     • SKIN GRAFT       Social History     Socioeconomic History   • Marital status: /Civil Union     Spouse name: None   • Number of children: None   • Years of education: None   • Highest education level: None   Occupational History   • None   Tobacco Use   • Smoking status: Never   • Smokeless tobacco: Never   Substance and Sexual Activity   • Alcohol use: No   • Drug use: No   • Sexual activity: None   Other Topics Concern   • None   Social History Narrative   • None     Social Determinants of Health     Financial Resource Strain: Not on file   Food Insecurity: Not on file   Transportation Needs: Not on file   Physical Activity: Not on file   Stress: Not on file   Social Connections: Not on file Intimate Partner Violence: Not on file   Housing Stability: Not on file        Current Outpatient Medications:   •  acetaminophen (TYLENOL) 325 mg tablet, Take 650 mg by mouth every 4 (four) hours as needed, Disp: , Rfl:   •  amLODIPine-benazepril (LOTREL) 10-20 MG per capsule, Take by mouth , Disp: , Rfl:   •  atorvastatin (LIPITOR) 20 mg tablet, Take 20 mg by mouth daily , Disp: , Rfl:   •  Biotin 10 MG CAPS, Take 10 mg by mouth daily, Disp: , Rfl:   •  bisacodyl (Dulcolax) 10 mg suppository, Insert 10 mg into the rectum daily, Disp: , Rfl:   •  carbidopa-levodopa (SINEMET)  mg per tablet, Take 1 tablet by mouth 4 (four) times a day, Disp: , Rfl:   •  Cranberry 450 MG TABS, Take 450 mg by mouth, Disp: , Rfl:   •  famotidine (PEPCID) 20 mg tablet, Take 20 mg by mouth 2 (two) times a day, Disp: , Rfl:   •  furosemide (LASIX) 20 mg tablet, Take 20 mg by mouth daily, Disp: , Rfl:   •  magnesium hydroxide (MILK OF MAGNESIA) 400 mg/5 mL oral suspension, Take by mouth daily as needed for constipation, Disp: , Rfl:   •  metoprolol tartrate (LOPRESSOR) 50 mg tablet, Take 50 mg by mouth 2 (two) times a day, Disp: , Rfl:   •  mirtazapine (REMERON) 15 mg tablet, Take 7.5 mg by mouth daily at bedtime, Disp: , Rfl:   •  Multiple Vitamin (DAILY VALUE MULTIVITAMIN) TABS, Take 1 tablet by mouth daily , Disp: , Rfl:   •  Multiple Vitamins-Minerals (HAIR SKIN NAILS PO), Take 3 tablets by mouth daily, Disp: , Rfl:   •  Nutritional Supplements (Micha) PACK, Take by mouth, Disp: , Rfl:   •  Omega-3 Fatty Acids (FISH OIL) 1,000 mg, Take 1,000 mg by mouth daily , Disp: , Rfl:   •  oxyCODONE (ROXICODONE) 5 mg immediate release tablet, Take 1 tablet (5 mg total) by mouth every 6 (six) hours as needed for moderate painMax Daily Amount: 20 mg, Disp: 15 tablet, Rfl: 0  •  phenytoin (DILANTIN) 100 mg ER capsule, Take 100 mg by mouth every 12 (twelve) hours , Disp: , Rfl:   •  potassium chloride (K-DUR,KLOR-CON) 10 mEq tablet, , Disp: , Rfl:   •  psyllium (Metamucil) 0.52 g capsule, Take 0.52 g by mouth daily, Disp: , Rfl:   •  saccharomyces boulardii (Florastor) 250 mg capsule, Take 250 mg by mouth 2 (two) times a day, Disp: , Rfl:   •  senna (SENOKOT) 8.6 MG tablet, Take 8.6 mg by mouth 2 (two) times a day, Disp: , Rfl:   •  sertraline (ZOLOFT) 25 mg tablet, Take 100 mg by mouth daily, Disp: , Rfl:   •  sodium phosphate-biphosphate (FLEET) 7-19 g 118 mL enema, Insert 1 enema into the rectum, Disp: , Rfl:   •  vitamin B-12 (CYANOCOBALAMIN) 250 MCG tablet, Take 500 mcg by mouth daily, Disp: , Rfl:   •  warfarin (COUMADIN) 6 mg tablet, Take 6 mg by mouth daily, Disp: , Rfl:   Family History   Problem Relation Age of Onset   • Diabetes Father    • Kidney cancer Father    • Alzheimer's disease Mother    • Bone cancer Paternal Uncle    • Lung cancer Paternal Uncle               Coordination of Care: Wound team aware of the treatment plan. Facility nurse will provide wound treatment and monitor the wound for any changes. Patient / Staff education : Patient / Staff was given education on sign of infection and pressure ulcer prevention. Patient/ Staff verbalized understanding     Follow up :  Sign off    Voice-recognition software may have been used in the preparation of this document. Occasional wrong word or "sound-alike" substitutions may have occurred due to the inherent limitations of voice recognition software. Interpretation should be guided by context.       SVETLANA [place]

## 2023-11-21 NOTE — PROGRESS NOTE ADULT - PROBLEM SELECTOR PLAN 3
Pt with new seizures in setting of brain mets, now with a seizure in ED. Per daughter, pt may not have been taking full dose of Keppra outpatient as she felt it made her sedated. Received Keppra 1g IV in ED post-seizure.  - C/w home dose keppra 500mg BID  - Monitor for seizure activity  - Neuro consulted, recs appreciated Metastatic breast cancer to lung, adrenal gland and brain, s/p palliative radical mastectomy earlier this month. Pt undecided on course of action for management on last discharge.  - Further GOC with patient when awakes. May warrant onc c/s on this admission if pt amenable to discussing options. None

## 2024-02-26 NOTE — PATIENT PROFILE ADULT - NSSCCAGEALCOHOLANNOYEDYOU_GEN_A_NUR
Show Applicator Variable?: Yes Detail Level: Detailed Post-Care Instructions: I reviewed with the patient in detail post-care instructions. Patient is to wear sunprotection, and avoid picking at any of the treated lesions. Pt may apply Vaseline to crusted or scabbing areas. Duration Of Freeze Thaw-Cycle (Seconds): 0 Render Post-Care Instructions In Note?: no Number Of Freeze-Thaw Cycles: 2 freeze-thaw cycles Consent: The patient's consent was obtained including but not limited to risks of crusting, scabbing, blistering, scarring, darker or lighter pigmentary change, recurrence, incomplete removal and infection. no

## 2024-07-29 NOTE — PROGRESS NOTE ADULT - NSHPATTENDINGPLANDISCUSS_GEN_ALL_CORE
Prep complete, ready for procedure  
Neurology team
pt, family, team
pt, team
team
HS
HS
resident

## 2024-11-01 NOTE — DISCHARGE NOTE ADULT - SECONDARY DIAGNOSIS.
SOB (shortness of breath) Resident Brain metastases Severe protein-calorie malnutrition Symptomatic anemia

## 2025-01-13 NOTE — PROGRESS NOTE ADULT - PROBLEM SELECTOR PROBLEM 5
Pt. Discharged home with wife. Education/Review of AVS including: medications, care/restrictions, follow up plan, and when to seek medical attention.   Pt. Returned stool softeners and ibuprofen to the pharmacy as he already has at home. Sent home with other prescription medications.   Pt. Reports plans to continue his prescription naproxen and not take the OTC ibuprofen. Pt. Aware not to take both medications.    Brain metastases
